# Patient Record
Sex: MALE | Race: WHITE | NOT HISPANIC OR LATINO | Employment: OTHER | URBAN - METROPOLITAN AREA
[De-identification: names, ages, dates, MRNs, and addresses within clinical notes are randomized per-mention and may not be internally consistent; named-entity substitution may affect disease eponyms.]

---

## 2017-02-03 ENCOUNTER — TELEPHONE (OUTPATIENT)
Dept: CARDIOLOGY | Facility: MEDICAL CENTER | Age: 67
End: 2017-02-03

## 2017-02-04 NOTE — TELEPHONE ENCOUNTER
I left a message for Ed to call us.  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  She wants a digoxin level yearly as well.

## 2017-02-09 NOTE — TELEPHONE ENCOUNTER
Juliet Siddiqi R.N.                 AM/Lianet     Patient is returning Siobhan's call and can be reached at [phone].       S/w pt, advised of lab work that needs to be done that was ordered in May which includes Dig level.  Pt will have lab work done tomorrow morning and will be fasting.  Pt takes Dig at night and was educated not taking Dig at least 8 hours prior to lab work.  Pt v/u.  Pt will go to [facility] facility for lab work and lab orders are still active.

## 2017-02-10 ENCOUNTER — HOSPITAL ENCOUNTER (OUTPATIENT)
Dept: LAB | Facility: MEDICAL CENTER | Age: 67
End: 2017-02-10
Attending: FAMILY MEDICINE
Payer: MEDICARE

## 2017-02-10 ENCOUNTER — HOSPITAL ENCOUNTER (OUTPATIENT)
Dept: LAB | Facility: MEDICAL CENTER | Age: 67
End: 2017-02-10
Attending: INTERNAL MEDICINE
Payer: MEDICARE

## 2017-02-10 DIAGNOSIS — R73.03 PRE-DIABETES: ICD-10-CM

## 2017-02-10 DIAGNOSIS — E78.5 HYPERLIPIDEMIA: ICD-10-CM

## 2017-02-10 DIAGNOSIS — I47.19 AVNRT (AV NODAL RE-ENTRY TACHYCARDIA): ICD-10-CM

## 2017-02-10 DIAGNOSIS — Z12.5 PROSTATE CANCER SCREENING: ICD-10-CM

## 2017-02-10 LAB
ALBUMIN SERPL BCP-MCNC: 4.7 G/DL (ref 3.2–4.9)
ALBUMIN/GLOB SERPL: 1.4 G/DL
ALP SERPL-CCNC: 74 U/L (ref 30–99)
ALT SERPL-CCNC: 28 U/L (ref 2–50)
ANION GAP SERPL CALC-SCNC: 9 MMOL/L (ref 0–11.9)
AST SERPL-CCNC: 22 U/L (ref 12–45)
BILIRUB SERPL-MCNC: 1.2 MG/DL (ref 0.1–1.5)
BUN SERPL-MCNC: 25 MG/DL (ref 8–22)
CALCIUM SERPL-MCNC: 9.6 MG/DL (ref 8.4–10.2)
CHLORIDE SERPL-SCNC: 105 MMOL/L (ref 96–112)
CHOLEST SERPL-MCNC: 196 MG/DL (ref 100–199)
CO2 SERPL-SCNC: 26 MMOL/L (ref 20–33)
CREAT SERPL-MCNC: 0.96 MG/DL (ref 0.5–1.4)
DIGOXIN SERPL-MCNC: 0.8 NG/ML (ref 0.8–2)
EST. AVERAGE GLUCOSE BLD GHB EST-MCNC: 103 MG/DL
GFR SERPL CREATININE-BSD FRML MDRD: >60 ML/MIN/1.73 M 2
GLOBULIN SER CALC-MCNC: 3.3 G/DL (ref 1.9–3.5)
GLUCOSE SERPL-MCNC: 116 MG/DL (ref 65–99)
HBA1C MFR BLD: 5.2 % (ref 0–5.6)
HDLC SERPL-MCNC: 53 MG/DL
LDLC SERPL CALC-MCNC: 117 MG/DL
POTASSIUM SERPL-SCNC: 4 MMOL/L (ref 3.6–5.5)
PROT SERPL-MCNC: 8 G/DL (ref 6–8.2)
PSA SERPL DL<=0.01 NG/ML-MCNC: 1.51 NG/ML (ref 0–4)
SODIUM SERPL-SCNC: 140 MMOL/L (ref 135–145)
TRIGL SERPL-MCNC: 130 MG/DL (ref 0–149)

## 2017-02-10 PROCEDURE — 83036 HEMOGLOBIN GLYCOSYLATED A1C: CPT | Mod: GA

## 2017-02-10 PROCEDURE — 84153 ASSAY OF PSA TOTAL: CPT | Mod: GA

## 2017-02-13 ENCOUNTER — TELEPHONE (OUTPATIENT)
Dept: CARDIOLOGY | Facility: MEDICAL CENTER | Age: 67
End: 2017-02-13

## 2017-02-13 NOTE — TELEPHONE ENCOUNTER
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 Would you like him to go to 20 mg QD?

## 2017-02-13 NOTE — TELEPHONE ENCOUNTER
PT called with message. No answer in both numbers.  Left VM to call back.  MMR    ----- Message from Luciana Ellington M.D. sent at 2/10/2017  4:30 PM PST -----  LDL not at goal.  Unclear but the patient is taking Zocor or not. If he is not taking Zocor consider restarting Zocor.  Labs prior to next visit  Scheduled him to see me in June 2017.    ----- Message -----     From: Maria Luisa Tamayo R.N.     Sent: 2/10/2017   8:30 AM       To: Luciana Ellington M.D.    No F/V

## 2017-02-14 DIAGNOSIS — E78.5 HYPERLIPIDEMIA, UNSPECIFIED HYPERLIPIDEMIA TYPE: ICD-10-CM

## 2017-02-14 RX ORDER — SIMVASTATIN 40 MG
40 TABLET ORAL EVERY EVENING
Qty: 90 TAB | Refills: 3 | OUTPATIENT
Start: 2017-02-14 | End: 2018-01-16 | Stop reason: SDUPTHER

## 2017-03-01 RX ORDER — LISINOPRIL 10 MG/1
TABLET ORAL
Qty: 90 TAB | Refills: 3 | Status: SHIPPED | OUTPATIENT
Start: 2017-03-01 | End: 2018-03-14 | Stop reason: SDUPTHER

## 2017-03-02 ENCOUNTER — OFFICE VISIT (OUTPATIENT)
Dept: MEDICAL GROUP | Facility: MEDICAL CENTER | Age: 67
End: 2017-03-02
Payer: MEDICARE

## 2017-03-02 VITALS
HEIGHT: 69 IN | SYSTOLIC BLOOD PRESSURE: 118 MMHG | OXYGEN SATURATION: 97 % | TEMPERATURE: 97.8 F | WEIGHT: 198 LBS | RESPIRATION RATE: 16 BRPM | HEART RATE: 82 BPM | BODY MASS INDEX: 29.33 KG/M2 | DIASTOLIC BLOOD PRESSURE: 82 MMHG

## 2017-03-02 DIAGNOSIS — Z95.0 CARDIAC PACEMAKER IN SITU: ICD-10-CM

## 2017-03-02 DIAGNOSIS — I47.19 AVNRT (AV NODAL RE-ENTRY TACHYCARDIA): ICD-10-CM

## 2017-03-02 DIAGNOSIS — I44.1 SECOND DEGREE AV BLOCK: ICD-10-CM

## 2017-03-02 DIAGNOSIS — I10 ESSENTIAL HYPERTENSION: ICD-10-CM

## 2017-03-02 DIAGNOSIS — E78.00 PURE HYPERCHOLESTEROLEMIA: ICD-10-CM

## 2017-03-02 DIAGNOSIS — K21.9 GASTROESOPHAGEAL REFLUX DISEASE, ESOPHAGITIS PRESENCE NOT SPECIFIED: ICD-10-CM

## 2017-03-02 DIAGNOSIS — E55.9 VITAMIN D INSUFFICIENCY: ICD-10-CM

## 2017-03-02 DIAGNOSIS — Z00.00 MEDICARE ANNUAL WELLNESS VISIT, SUBSEQUENT: ICD-10-CM

## 2017-03-02 DIAGNOSIS — R73.03 PRE-DIABETES: ICD-10-CM

## 2017-03-02 DIAGNOSIS — Z85.828 HX OF SKIN CANCER, BASAL CELL: ICD-10-CM

## 2017-03-02 PROCEDURE — G8510 SCR DEP NEG, NO PLAN REQD: HCPCS | Performed by: FAMILY MEDICINE

## 2017-03-02 PROCEDURE — 1036F TOBACCO NON-USER: CPT | Performed by: FAMILY MEDICINE

## 2017-03-02 PROCEDURE — G0439 PPPS, SUBSEQ VISIT: HCPCS | Performed by: FAMILY MEDICINE

## 2017-03-02 RX ORDER — PANTOPRAZOLE SODIUM 40 MG/1
40 TABLET, DELAYED RELEASE ORAL
Qty: 90 TAB | Refills: 3 | Status: SHIPPED | OUTPATIENT
Start: 2017-03-02 | End: 2017-11-15 | Stop reason: SDUPTHER

## 2017-03-02 ASSESSMENT — PATIENT HEALTH QUESTIONNAIRE - PHQ9: CLINICAL INTERPRETATION OF PHQ2 SCORE: 0

## 2017-03-02 NOTE — MR AVS SNAPSHOT
"        Emil Reid   3/2/2017 11:00 AM   Office Visit   MRN: [id number]    Department:  [facility]   Dept Phone:  [phone]    Description:  Male : 1950   Provider:  Edmond Flower M.D.           Allergies as of 3/2/2017     No Known Allergies      You were diagnosed with     Medicare annual wellness visit, subsequent   [812853]       Pure hypercholesterolemia   [272.0.ICD-9-CM]       Essential hypertension   [7948812]       Cardiac pacemaker in situ   [V45.01.ICD-9-CM]       Second degree AV block   [619449]       AVNRT (AV hitesh re-entry tachycardia) (CMS-HCC)   [282410]       Vitamin D insufficiency   [944795]       Pre-diabetes   [711964]       Gastroesophageal reflux disease, esophagitis presence not specified   [8385331]       Hx of skin cancer, basal cell   [471412]         Vital Signs     Blood Pressure Pulse Temperature Respirations Height Weight    118/82 mmHg 82 36.6 °C (97.8 °F) 16 1.753 m (5' 9\") 89.812 kg (198 lb)    Body Mass Index Oxygen Saturation Smoking Status             29.23 kg/m2 97% Never Smoker          Basic Information     Date Of Birth Sex Race Ethnicity Preferred Language    1950 Male White Non- English      Problem List              ICD-10-CM Priority Class Noted - Resolved    Hypertension I10 High  2014 - Present    Hyperlipidemia E78.5 High  2014 - Present    GERD (gastroesophageal reflux disease) K21.9 Medium  2014 - Present    Vitamin D insufficiency E55.9 Low  2014 - Present    Hx of skin cancer, basal cell Z85.828 Low  2014 - Present    Pre-diabetes R73.03   2015 - Present    AVNRT (AV hitesh re-entry tachycardia) (CMS-HCC) I47.1 High  2015 - Present    Second degree AV block I44.1 High  2015 - Present    Cardiac pacemaker in situ Z95.0 High  2015 - Present      Health Maintenance        Date Due Completion Dates    IMM ZOSTER VACCINE 2008 ---    IMM PNEUMOCOCCAL 65+ (ADULT) LOW/MEDIUM RISK SERIES (1 " of 2 - PCV13) 6/16/2013 ---    COLONOSCOPY 5/4/2020 5/4/2010, 5/4/2010 (Done)    Override on 5/4/2010: Done    IMM DTaP/Tdap/Td Vaccine (2 - Td) 1/17/2021 1/17/2011            Current Immunizations     Influenza TIV (IM) 10/14/2014    Influenza Vaccine Adult HD 9/9/2016    Influenza Vaccine Quad Inj (Pf) 9/2/2015    Tdap Vaccine 1/17/2011      Below and/or attached are the medications your provider expects you to take. Review all of your home medications and newly ordered medications with your provider and/or pharmacist. Follow medication instructions as directed by your provider and/or pharmacist. Please keep your medication list with you and share with your provider. Update the information when medications are discontinued, doses are changed, or new medications (including over-the-counter products) are added; and carry medication information at all times in the event of emergency situations     Allergies:  No Known Allergies          Medications  Valid as of: March 02, 2017 - 12:30 PM    Generic Name Brand Name Tablet Size Instructions for use    Ascorbic Acid (Tab) ascorbic acid 500 MG Take 500 mg by mouth every day.        Aspirin (Chew Tab) ASA 81 MG Take 81 mg by mouth every day.        Calcium Carb-Cholecalciferol (Tab) Calcium + D3 600-200 MG-UNIT Take  by mouth.        Cholecalciferol   1,000 Units every day.        Digoxin (Tab) LANOXIN 250 MCG Take 1 Tab by mouth every day.        Lisinopril (Tab) PRINIVIL 10 MG TAKE 1 TABLET BY MOUTH EVERY DAY        Multiple Vitamins-Minerals   Take  by mouth.        Omega-3 Fatty Acids (Cap) OMEGA 3 FA 1000 MG Take 1,000 mg by mouth 3 times a day, with meals.        Pantoprazole Sodium (Tablet Delayed Response) PROTONIX 40 MG Take 1 Tab by mouth every day.        Simvastatin (Tab) ZOCOR 40 MG Take 1 Tab by mouth every evening.        .                 Medicines prescribed today were sent to:     [id number] - [place], [place] - [address] AT [facility]    [address] [place] [address]    Phone: [phone] Fax: [phone]    Open 24 Hours?: No    [id number] - [place], [place] - [address]    [address] [place] [address]    Phone: [phone] Fax: [phone]    Open 24 Hours?: No      Medication refill instructions:       If your prescription bottle indicates you have medication refills left, it is not necessary to call your provider’s office. Please contact your pharmacy and they will refill your medication.    If your prescription bottle indicates you do not have any refills left, you may request refills at any time through one of the following ways: The online Desmos system (except Urgent Care), by calling your provider’s office, or by asking your pharmacy to contact your provider’s office with a refill request. Medication refills are processed only during regular business hours and may not be available until the next business day. Your provider may request additional information or to have a follow-up visit with you prior to refilling your medication.   *Please Note: Medication refills are assigned a new Rx number when refilled electronically. Your pharmacy may indicate that no refills were authorized even though a new prescription for the same medication is available at the pharmacy. Please request the medicine by name with the pharmacy before contacting your provider for a refill.           Desmos Access Code: Activation code not generated  Current Desmos Status: Active

## 2017-03-02 NOTE — PROGRESS NOTES
HPI:  Emil is a 66 y.o. here for Medicare Annual Wellness Visit     Patient states that his heartburn is well controlled with pantoprazole.  Is being followed by cardiology. Patient has no complaints with pacemaker. He is tolerating lisinopril, digoxin, simvastatin. He states his simvastatin dose was increased because his LDL was over 100 at 117.    Patient Active Problem List    Diagnosis Date Noted   • Cardiac pacemaker in situ 07/07/2015     Priority: High   • Second degree AV block 07/01/2015     Priority: High   • AVNRT (AV hitesh re-entry tachycardia) (CMS-[facility]) 06/22/2015     Priority: High   • Hypertension 01/09/2014     Priority: High   • Hyperlipidemia 01/09/2014     Priority: High   • GERD (gastroesophageal reflux disease) 01/09/2014     Priority: Medium   • Vitamin D insufficiency 01/09/2014     Priority: Low   • Hx of skin cancer, basal cell 01/09/2014     Priority: Low   • Pre-diabetes 04/22/2015       Current Outpatient Prescriptions   Medication Sig Dispense Refill   • pantoprazole (PROTONIX) 40 MG Tablet Delayed Response Take 1 Tab by mouth every day. 90 Tab 3   • simvastatin (ZOCOR) 40 MG Tab Take 1 Tab by mouth every evening. 90 Tab 3   • digoxin (LANOXIN) 250 MCG Tab Take 1 Tab by mouth every day. 90 Tab 3   • Calcium Carb-Cholecalciferol (CALCIUM + D3) 600-200 MG-UNIT TABS Take  by mouth.     • docosahexanoic acid (OMEGA 3 FA) 1000 MG CAPS Take 1,000 mg by mouth 3 times a day, with meals.     • Cholecalciferol (VITAMIN D3) 1,000 Units every day.     • Multiple Vitamins-Minerals (CENTRUM SILVER ULTRA MENS PO) Take  by mouth.     • ascorbic acid (ASCORBIC ACID) 500 MG TABS Take 500 mg by mouth every day.     • aspirin (ASA) 81 MG CHEW Take 81 mg by mouth every day.     • lisinopril (PRINIVIL) 10 MG Tab TAKE 1 TABLET BY MOUTH EVERY DAY 90 Tab 3     No current facility-administered medications for this visit.            Current supplements as per medication list.       Allergies: Review of  patient's allergies indicates no known allergies.    Current social contact/activities: lives with wife. 1 daughter in [place]. 1 son in [facility]. Has a male clubs that he meets up with daily.     He  reports that he has never smoked. He has quit using smokeless tobacco. He reports that he drinks about 1.0 oz of alcohol per week. He reports that he does not use illicit drugs.  Counseling given: Not Answered        DPA/Advanced directive: Patient does have an advanced directive. If not on file, instructed to bring in a copy to scan into their chart. If no advanced directive exists, a packet and workshop information was given on advanced directives.    ROS:    Gait: Uses no assistive device   Ostomy: no   Other tubes: no   Amputations: no   Chronic oxygen use no   Last eye exam: within the last year.   : Denies incontinence.         Depression Screening    Little interest or pleasure in doing things?  0 - not at all  Feeling down, depressed , or hopeless? 0 - not at all  Trouble falling or staying asleep, or sleeping too much?     Feeling tired or having little energy?     Poor appetite or overeating?     Feeling bad about yourself - or that you are a failure or have let yourself or your family down?    Trouble concentrating on things, such as reading the newspaper or watching television?    Moving or speaking so slowly that other people could have noticed.  Or the opposite - being so fidgety or restless that you have been moving around a lot more than usual?     Thoughts that you would be better off dead, or of hurting yourself?     Patient Health Questionnaire Score:      If depressive symptoms identified deferred to follow up visit unless specifically addressed in assesment and plan.      Screening for Cognitive Impairment    Three Minute Recall (banana, sunrise, fence)  3/3    Draw clock face with all 12 numbers set to the hand to show 10 minures past 11 o'clock  1    Cognitive concerns identified defferred  for follow up unless specifically addressed in assesment and plan.    Fall Risk Assessment    Has the patient had two or more falls in the last year or any fall with injury in the last year?  No    Safety Assessment    Throw rugs on floor.  Yes  Handrails on all stairs.  Yes  Good lighting in all hallways.  Yes  Difficulty hearing.  No  Patient counseled about all safety risks that were identified.    Functional Assessment ADLs    Are there any barriers preventing you from cooking for yourself or meeting nutritional needs?  No.    Are there any barriers preventing you from driving safely or obtaining transportation?  No.    Are there any barriers preventing you from using a telephone or calling for help?  No.    Are there any barriers preventing you from shopping?  No.    Are there any barriers preventing you from taking care of your own finances?  No.    Are there any barriers preventing you from managing your medications?  No.    Are currently engaing any exercise or physical activity?  Yes.       Health Maintenance Summary                IMM ZOSTER VACCINE Overdue 6/16/2008     IMM PNEUMOCOCCAL 65+ (ADULT) LOW/MEDIUM RISK SERIES Overdue 6/16/2013     Annual Wellness Visit Overdue 3/1/2017      Done 2/29/2016 Visit Dx: Medicare annual wellness visit, subsequent     Patient has more history with this topic...    COLONOSCOPY Next Due 5/4/2020      Done 5/4/2010      Patient has more history with this topic...    IMM DTaP/Tdap/Td Vaccine Next Due 1/17/2021      Done 1/17/2011 Imm Admin: Tdap Vaccine          Patient Care Team:  Edmond Flower M.D. as PCP - General (Family Medicine)      Social History   Substance Use Topics   • Smoking status: Never Smoker    • Smokeless tobacco: Former User      Comment: 8-10 years, but quit 35 years ago   • Alcohol Use: 1.0 oz/week     1 Glasses of wine, 1 Cans of beer per week      Comment: occ     Family History   Problem Relation Age of Onset   • Heart Disease Mother    •  "Diabetes Mother    • Cancer Father 86     Pancreatitic     He  has a past medical history of GERD (gastroesophageal reflux disease) ( ); Hypertension; Hyperlipemia; Heart burn; Indigestion; AV block (July 2015); and AVNRT (AV hitesh re-entry tachycardia) (CMS-[facility]).   Past Surgical History   Procedure Laterality Date   • Appendectomy     • Inguinal hernia repair bilateral     • Pilonidal cyst excision     • Recovery  7/1/2015     Procedure:  CATH LAB  PM INSERT [facility] ICD9: 426.13;  Surgeon: Recoveryonly Surgery;  Location: SURGERY PRE-POST PROC UNIT [facility];  Service:    • Pacemaker insertion  July 2015     [facility] #[id number] implanted by Dr. Sanchez.       Exam:     Blood pressure 118/82, pulse 82, temperature 36.6 °C (97.8 °F), resp. rate 16, height 1.753 m (5' 9\"), weight 89.812 kg (198 lb), SpO2 97 %. Body mass index is 29.23 kg/(m^2).    Constitutional: Alert, no distress.  Skin: Warm, dry, good turgor, no rashes in visible areas.  Eye: Equal, round and reactive, conjunctiva clear, lids normal.  ENMT: Lips without lesions, good dentition, oropharynx clear.  Neck: Trachea midline, no masses, no thyromegaly. No cervical or supraclavicular lymphadenopathy  Respiratory: Unlabored respiratory effort, lungs clear to auscultation, no wheezes, no ronchi.  Cardiovascular: Normal S1, S2, no murmur, no edema.  Psych: Alert and oriented x3, normal affect and mood.        Assessment and Plan. The following treatment and monitoring plan is recommended:    1. Medicare annual wellness visit, subsequent  Patient has lost about 17 pounds over last 2 years by eating healthy.  Patient believes he has had pneumonia and shingles vaccine. He forgets to bring records today. He will bring records at next visit.  - Annual Wellness Visit - Includes PPPS Subsequent ()    2. Pure hypercholesterolemia  Continue current medication and follow up with cardiology as scheduled with labs.  - Annual Wellness Visit - Includes " PPPS Subsequent ()    3. Essential hypertension  Controlled. Continue current medication.  - Annual Wellness Visit - Includes PPPS Subsequent ()    4. Cardiac pacemaker in situ  Stable.  - Annual Wellness Visit - Includes PPPS Subsequent ()    5. Second degree AV block  Controlled. Continue pacemaker.  - Annual Wellness Visit - Includes PPPS Subsequent ()    6. AVNRT (AV hitesh re-entry tachycardia) (CMS-HCC)  Controlled. Continue pacemaker.  - Annual Wellness Visit - Includes PPPS Subsequent ()    7. Vitamin D insufficiency  Continue supplementation.  - Annual Wellness Visit - Includes PPPS Subsequent ()    8. Pre-diabetes  Advised healthy diet and regular exercise.  - Annual Wellness Visit - Includes PPPS Subsequent ()    9. Gastroesophageal reflux disease, esophagitis presence not specified  Controlled. Continue current medication.  - pantoprazole (PROTONIX) 40 MG Tablet Delayed Response; Take 1 Tab by mouth every day.  Dispense: 90 Tab; Refill: 3  - Annual Wellness Visit - Includes PPPS Subsequent ()    10. Hx of skin cancer, basal cell  Continue following with dermatology  - Annual Wellness Visit - Includes PPPS Subsequent ()        Services needed: as per orders if indicated.  Health Care Screening: Age-appropriate preventive services Medicare covers discussed today and ordered if indicated.    Referrals offered: Community-based lifestyle interventions to reduce health risks and promote self-management and wellness, fall prevention, nutrition, physical activity, tobacco-use cessation, weight loss, and mental health services as per orders if indicated.    Discussion today about general wellness and lifestyle habits:    · Prevent falls and reduce trip hazards; Cautioned about securing or removing rugs.  · Have a working fire alarm and carbon monoxide detector;   · Engage in regular physical activity and social activities       Follow-up: Return in about 1 year (around  3/2/2018) for Annual, Long.

## 2017-04-14 ENCOUNTER — HOSPITAL ENCOUNTER (OUTPATIENT)
Dept: LAB | Facility: MEDICAL CENTER | Age: 67
End: 2017-04-14
Attending: INTERNAL MEDICINE
Payer: MEDICARE

## 2017-04-14 DIAGNOSIS — E78.5 HYPERLIPIDEMIA, UNSPECIFIED HYPERLIPIDEMIA TYPE: ICD-10-CM

## 2017-04-14 LAB
ALBUMIN SERPL BCP-MCNC: 4.2 G/DL (ref 3.2–4.9)
ALBUMIN/GLOB SERPL: 1.4 G/DL
ALP SERPL-CCNC: 85 U/L (ref 30–99)
ALT SERPL-CCNC: 30 U/L (ref 2–50)
ANION GAP SERPL CALC-SCNC: 6 MMOL/L (ref 0–11.9)
AST SERPL-CCNC: 24 U/L (ref 12–45)
BILIRUB SERPL-MCNC: 0.4 MG/DL (ref 0.1–1.5)
BUN SERPL-MCNC: 24 MG/DL (ref 8–22)
CALCIUM SERPL-MCNC: 9.3 MG/DL (ref 8.5–10.5)
CHLORIDE SERPL-SCNC: 108 MMOL/L (ref 96–112)
CHOLEST SERPL-MCNC: 151 MG/DL (ref 100–199)
CO2 SERPL-SCNC: 26 MMOL/L (ref 20–33)
CREAT SERPL-MCNC: 0.8 MG/DL (ref 0.5–1.4)
GFR SERPL CREATININE-BSD FRML MDRD: >60 ML/MIN/1.73 M 2
GLOBULIN SER CALC-MCNC: 2.9 G/DL (ref 1.9–3.5)
GLUCOSE SERPL-MCNC: 106 MG/DL (ref 65–99)
HDLC SERPL-MCNC: 54 MG/DL
LDLC SERPL CALC-MCNC: 73 MG/DL
POTASSIUM SERPL-SCNC: 4.1 MMOL/L (ref 3.6–5.5)
PROT SERPL-MCNC: 7.1 G/DL (ref 6–8.2)
SODIUM SERPL-SCNC: 140 MMOL/L (ref 135–145)
TRIGL SERPL-MCNC: 122 MG/DL (ref 0–149)

## 2017-04-14 PROCEDURE — 36415 COLL VENOUS BLD VENIPUNCTURE: CPT

## 2017-04-14 PROCEDURE — 80053 COMPREHEN METABOLIC PANEL: CPT

## 2017-04-14 PROCEDURE — 80061 LIPID PANEL: CPT

## 2017-04-18 ENCOUNTER — PATIENT MESSAGE (OUTPATIENT)
Dept: MEDICAL GROUP | Facility: MEDICAL CENTER | Age: 67
End: 2017-04-18

## 2017-04-18 RX ORDER — DOXYCYCLINE HYCLATE 100 MG
100 TABLET ORAL 2 TIMES DAILY
Qty: 14 TAB | Refills: 0 | Status: SHIPPED | OUTPATIENT
Start: 2017-04-18 | End: 2017-04-25

## 2017-04-18 NOTE — TELEPHONE ENCOUNTER
From: Emil Reid  To: Edmond Flower M.D.  Sent: 4/18/2017 7:27 AM PDT  Subject: Non-Urgent Medical Question    I am going to [facility] [place] [place] of [place] for about 3 weeks. I'll be hiking most of the time. I'm thinking I should take an antibiotic with me just in case I get sick. Will you send a prescription to [facility] please?   Thank you,  Emil Reid  1950

## 2017-04-20 ENCOUNTER — PATIENT MESSAGE (OUTPATIENT)
Dept: CARDIOLOGY | Facility: MEDICAL CENTER | Age: 67
End: 2017-04-20

## 2017-04-20 DIAGNOSIS — I47.19 AVNRT (AV NODAL RE-ENTRY TACHYCARDIA): ICD-10-CM

## 2017-04-20 RX ORDER — DIGOXIN 250 MCG
250 TABLET ORAL DAILY
Qty: 90 TAB | Refills: 0 | OUTPATIENT
Start: 2017-04-20 | End: 2017-07-17 | Stop reason: SDUPTHER

## 2017-06-23 RX ORDER — LISINOPRIL 10 MG/1
TABLET ORAL
Qty: 90 TAB | Refills: 3 | Status: ON HOLD | OUTPATIENT
Start: 2017-06-23 | End: 2018-02-21

## 2017-06-23 NOTE — TELEPHONE ENCOUNTER
Was the patient seen in the last year in this department? Yes     Does patient have an active prescription for medications requested? No     Received Request Via: Pharmacy     Change in Pharmacy  Last seen: 03/02/2017 [place]

## 2017-07-17 DIAGNOSIS — I47.19 AVNRT (AV NODAL RE-ENTRY TACHYCARDIA): ICD-10-CM

## 2017-07-17 RX ORDER — DIGOXIN 250 MCG
250 TABLET ORAL DAILY
Qty: 90 TAB | Refills: 0 | OUTPATIENT
Start: 2017-07-17 | End: 2017-08-17 | Stop reason: SDUPTHER

## 2017-07-19 ENCOUNTER — OFFICE VISIT (OUTPATIENT)
Dept: CARDIOLOGY | Facility: MEDICAL CENTER | Age: 67
End: 2017-07-19
Payer: MEDICARE

## 2017-07-19 ENCOUNTER — NON-PROVIDER VISIT (OUTPATIENT)
Dept: CARDIOLOGY | Facility: MEDICAL CENTER | Age: 67
End: 2017-07-19
Payer: MEDICARE

## 2017-07-19 VITALS
WEIGHT: 196 LBS | BODY MASS INDEX: 29.03 KG/M2 | HEIGHT: 69 IN | DIASTOLIC BLOOD PRESSURE: 72 MMHG | OXYGEN SATURATION: 96 % | SYSTOLIC BLOOD PRESSURE: 122 MMHG | HEART RATE: 72 BPM

## 2017-07-19 VITALS
HEART RATE: 72 BPM | HEIGHT: 69 IN | BODY MASS INDEX: 29.03 KG/M2 | OXYGEN SATURATION: 96 % | DIASTOLIC BLOOD PRESSURE: 72 MMHG | SYSTOLIC BLOOD PRESSURE: 122 MMHG | WEIGHT: 196 LBS

## 2017-07-19 DIAGNOSIS — Z95.0 CARDIAC PACEMAKER IN SITU: ICD-10-CM

## 2017-07-19 DIAGNOSIS — I44.1 SECOND DEGREE AV BLOCK: ICD-10-CM

## 2017-07-19 DIAGNOSIS — I47.19 AVNRT (AV NODAL RE-ENTRY TACHYCARDIA): ICD-10-CM

## 2017-07-19 DIAGNOSIS — R09.89 PROMINENT ABDOMINAL AORTIC PULSATION: ICD-10-CM

## 2017-07-19 DIAGNOSIS — E78.00 PURE HYPERCHOLESTEROLEMIA: ICD-10-CM

## 2017-07-19 DIAGNOSIS — I10 ESSENTIAL HYPERTENSION: ICD-10-CM

## 2017-07-19 PROCEDURE — 93288 INTERROG EVL PM/LDLS PM IP: CPT | Performed by: NURSE PRACTITIONER

## 2017-07-19 PROCEDURE — 99214 OFFICE O/P EST MOD 30 MIN: CPT | Performed by: INTERNAL MEDICINE

## 2017-07-19 RX ORDER — DIGOXIN 125 MCG
125 TABLET ORAL PRN
Qty: 30 TAB | Refills: 0 | Status: SHIPPED | OUTPATIENT
Start: 2017-07-19 | End: 2017-08-17 | Stop reason: SDUPTHER

## 2017-07-19 ASSESSMENT — ENCOUNTER SYMPTOMS
SHORTNESS OF BREATH: 0
CLAUDICATION: 0
ORTHOPNEA: 0
NERVOUS/ANXIOUS: 0
DEPRESSION: 0
ABDOMINAL PAIN: 0
MYALGIAS: 0
PND: 0
SPEECH CHANGE: 0
PALPITATIONS: 0
FEVER: 0
BRUISES/BLEEDS EASILY: 0
LOSS OF CONSCIOUSNESS: 0
BLURRED VISION: 0

## 2017-07-19 NOTE — PROGRESS NOTES
Subjective:   Emil Reid is a 65 y.o. 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 Pierre presenting today for follow-up evaluation of AVNRT and PPM evaluation.    Patient recently ran out of digoxin no further episodes of AVNRT. Denied any complaints of exertional chest pain pressure or tightness. No complaints of palpitations orthopnea or PND. Denied any complaints of myalgias while on statins. No complaints of lower extremity edema or claudication.    Past Medical History   Diagnosis Date   • GERD (gastroesophageal reflux disease)     • Hypertension    • Hyperlipemia    • Heart burn    • Indigestion    • AV block July 2015     Status post PPM implantation.   • AVNRT (AV hitesh re-entry tachycardia)      Past Surgical History   Procedure Laterality Date   • Appendectomy     • Inguinal hernia repair bilateral     • Pilonidal cyst excision     • Recovery  7/1/2015     Procedure:  CATH LAB  PM INSERT [facility] ICD9: 426.13;  Surgeon: Recoveryonly Surgery;  Location: SURGERY PRE-POST PROC UNIT [facility] [place];  Service:    • Pacemaker insertion  July 2015     [employer] [facility] [facility] #[id number] implanted by Dr. Sanchez.     Family History   Problem Relation Age of Onset   • Heart Disease Mother    • Diabetes Mother    • Cancer Father 86     Pancreatitic     History   Smoking status   • Never Smoker    Smokeless tobacco   • Former User     Comment: 8-10 years, but quit 35 years ago     No Known Allergies  Outpatient Encounter Prescriptions as of 7/19/2017   Medication Sig Dispense Refill   • digoxin (LANOXIN) 250 MCG Tab Take 1 Tab by mouth every day. 90 Tab 0   • pantoprazole (PROTONIX) 40 MG Tablet Delayed Response Take 1 Tab by mouth every day. 90 Tab 3   • lisinopril (PRINIVIL) 10 MG Tab TAKE 1 TABLET BY MOUTH EVERY DAY 90 Tab 3   • simvastatin (ZOCOR) 40 MG Tab Take 1 Tab by mouth every evening. 90 Tab 3   • Calcium  "Carb-Cholecalciferol (CALCIUM + D3) 600-200 MG-UNIT TABS Take  by mouth.     • docosahexanoic acid (OMEGA 3 FA) 1000 MG CAPS Take 1,000 mg by mouth 3 times a day, with meals.     • Multiple Vitamins-Minerals (CENTRUM SILVER ULTRA MENS PO) Take  by mouth.     • ascorbic acid (ASCORBIC ACID) 500 MG TABS Take 500 mg by mouth every day.     • aspirin (ASA) 81 MG CHEW Take 81 mg by mouth every day.     • lisinopril (PRINIVIL) 10 MG Tab TAKE 1 TABLET BY MOUTH DAILY 90 Tab 3   • Cholecalciferol (VITAMIN D3) 1,000 Units every day.       No facility-administered encounter medications on file as of 7/19/2017.     Review of Systems   Constitutional: Negative for fever.   HENT: Negative for congestion.    Eyes: Negative for blurred vision.   Respiratory: Negative for shortness of breath.    Cardiovascular: Negative for chest pain, palpitations, orthopnea, claudication, leg swelling and PND.   Gastrointestinal: Negative for abdominal pain.   Musculoskeletal: Negative for myalgias and joint pain.   Skin: Negative for rash.   Neurological: Negative for speech change and loss of consciousness.   Endo/Heme/Allergies: Does not bruise/bleed easily.   Psychiatric/Behavioral: Negative for depression. The patient is not nervous/anxious.    All other systems reviewed and are negative.       Objective:   /72 mmHg  Pulse 72  Ht 1.753 m (5' 9.02\")  Wt 88.905 kg (196 lb)  BMI 28.93 kg/m2  SpO2 96%    Physical Exam   Constitutional: He is oriented to person, place, and time. He appears well-developed and well-nourished.   HENT:   Mouth/Throat: Oropharynx is clear and moist.   Eyes: Conjunctivae and EOM are normal.   Neck: No JVD present. No tracheal deviation present. No thyroid mass present.   Cardiovascular: Normal rate, regular rhythm, S1 normal, S2 normal and normal pulses.  PMI is not displaced.  Exam reveals no gallop.    No murmur heard.  Pulses:       Carotid pulses are 2+ on the right side, and 2+ on the left side.       " Radial pulses are 2+ on the right side, and 2+ on the left side.        Femoral pulses are 2+ on the right side, and 2+ on the left side.       Dorsalis pedis pulses are 2+ on the right side, and 2+ on the left side.   No vascular bruits; peripheral pulses intact   Pulmonary/Chest: Effort normal and breath sounds normal. He has no rales.   Pacemaker site has healed   Abdominal: Soft. Normal appearance. He exhibits no abdominal bruit. There is no hepatosplenomegaly. There is no tenderness.   Prominent abdominal aortic pulsation   Musculoskeletal: Normal range of motion. He exhibits no edema.        Thoracic back: He exhibits no tenderness and no spasm.   Neurological: He is alert and oriented to person, place, and time.   Skin: No rash noted. No cyanosis. Nails show no clubbing.   Psychiatric: He has a normal mood and affect.      Results for ISRA SIMMONS (MRN [id number]) as of 2017 09:32   2/10/2017  2017    Sodium 140 140   Potassium 4.0 4.1   Chloride 105 108   Co2 26 26   Anion Gap 9.0 6.0   Glucose 116 (H) 106 (H)   Bun 25 (H) 24 (H)   Creatinine 0.96 0.80   GFR If Non African American >60 >60   Calcium 9.6 9.3   AST(SGOT) 22 24   ALT(SGPT) 28 30   Alkaline Phosphatase 74 85   Cholesterol,Tot 196 151   Triglycerides 130 122   HDL 53 54    (H) 73     EK/2/15  EKG personally reviewed by me.  NSR normal axis no significant ST or T-wave changes.  ms.    Nuclear stress test: 4/23/15  No evidence of significant jeopardized viable myocardium or prior myocardial infarction.  Normal left ventricular size, ejection fraction, and wall motion. EF 77%  No EKG changes with stress.    Transthoracic echo: 14  Normal left ventricular chamber size. Normal left ventricular systolic function. Grade I diastolic dysfunction is present. Mild concentric left ventricular hypertrophy. Left ventricular ejection fraction is 60% to 65%.  Severely dilated left atrium.  Trace mitral regurgitation.  Trace  aortic insufficiency.  Trace tricuspid regurgitation. Right ventricular systolic pressure is estimated to be 29 mmHg.  Trace pulmonic insufficiency    Assessment:     1. SVT  2. AVB s/p PPM  3. Dyslipidemia  4. HTN  5. Prominent abdominal aortic pulsation  Medical Decision Making:  Today's Assessment / Status / Plan:     1. Okay to take digoxin 250 mcg on PRN basis.  Also went over vagal maneuvers to terminate SVT episodes be   2. Device interrogation showed 12 mode switching episodes, all <10 seconds each.  Normal sensing and capture of RA and RV leads; stable impedances. Battery longevity is 8.4-9.1 years  3. Continue Zocor 10 mg qhs.  Check labs one year.  LFTs within normal range.  4. Continue lisinopril for blood pressure.  5. Abdominal aortic Doppler now.    If no evidence of aneurysm on Doppler follow-up in one year.  Labs prior to next visit.    Thank you for allowing me to participate in taking care of patient.    Luciana Mcbride MD.

## 2017-07-19 NOTE — PROGRESS NOTES
Device is working normally. 12 mode switching episodes, all <10 seconds each.  Normal sensing and capture of RA and RV leads; stable impedances. Battery longevity is 8.4-9.1 years.  No changes are made today.    FU in 6 months for next PM check with me. He does see Dr. Ellington today too.    Collaborating MD: Chandana

## 2017-07-19 NOTE — MR AVS SNAPSHOT
"        Emil Reid   2017 9:30 AM   Office Visit   MRN: [id number]    Department:  Heart [facility]   Dept Phone:  [phone]    Description:  Male : 1950   Provider:  Luciana Ellington M.D.           Reason for Visit     Follow-Up           Allergies as of 2017     No Known Allergies      You were diagnosed with     Prominent abdominal aortic pulsation   [483968]       Essential hypertension   [8560505]       Pure hypercholesterolemia   [272.0.ICD-9-CM]       AVNRT (AV hitesh re-entry tachycardia)   [581534]       Second degree AV block   [362599]       Cardiac pacemaker in situ   [V45.01.ICD-9-CM]         Vital Signs     Blood Pressure Pulse Height Weight Body Mass Index Oxygen Saturation    122/72 mmHg 72 1.753 m (5' 9.02\") 88.905 kg (196 lb) 28.93 kg/m2 96%    Smoking Status                   Never Smoker            Basic Information     Date Of Birth Sex Race Ethnicity Preferred Language    1950 Male White Non- English      Your appointments     Aug 07, 2017  2:00 PM   AORTO/ILIAC COMPLETE with VASCULAR LAB [facility], VASCULAR EXAM ROOM [facility]   NON-INVASIVE LAB [facility])    [address]  [place] [address]   [phone]           NPO x  6-8 hours, may take meds. Unless Diabetic, may have light meal.              Problem List              ICD-10-CM Priority Class Noted - Resolved    Hypertension I10 High  2014 - Present    Hyperlipidemia E78.5 High  2014 - Present    GERD (gastroesophageal reflux disease) K21.9 Medium  2014 - Present    Vitamin D insufficiency E55.9 Low  2014 - Present    Hx of skin cancer, basal cell Z85.828 Low  2014 - Present    Pre-diabetes R73.03   2015 - Present    AVNRT (AV hitesh re-entry tachycardia) I47.1 High  2015 - Present    Second degree AV block I44.1 High  2015 - Present    Cardiac pacemaker in situ Z95.0 High  2015 - Present      Health Maintenance        Date Due Completion Dates    IMM ZOSTER " VACCINE 6/16/2008 ---    IMM PNEUMOCOCCAL 65+ (ADULT) LOW/MEDIUM RISK SERIES (1 of 2 - PCV13) 6/16/2013 ---    IMM INFLUENZA (1) 9/1/2017 9/9/2016, 9/2/2015, 10/14/2014    COLONOSCOPY 5/4/2020 5/4/2010, 5/4/2010 (Done)    Override on 5/4/2010: Done    IMM DTaP/Tdap/Td Vaccine (2 - Td) 1/17/2021 1/17/2011            Current Immunizations     Influenza TIV (IM) 10/14/2014    Influenza Vaccine Adult HD 9/9/2016    Influenza Vaccine Quad Inj (Pf) 9/2/2015    Tdap Vaccine 1/17/2011      Below and/or attached are the medications your provider expects you to take. Review all of your home medications and newly ordered medications with your provider and/or pharmacist. Follow medication instructions as directed by your provider and/or pharmacist. Please keep your medication list with you and share with your provider. Update the information when medications are discontinued, doses are changed, or new medications (including over-the-counter products) are added; and carry medication information at all times in the event of emergency situations     Allergies:  No Known Allergies          Medications  Valid as of: July 19, 2017 -  9:47 AM    Generic Name Brand Name Tablet Size Instructions for use    Ascorbic Acid (Tab) ascorbic acid 500 MG Take 500 mg by mouth every day.        Aspirin (Chew Tab) ASA 81 MG Take 81 mg by mouth every day.        Calcium Carb-Cholecalciferol (Tab) Calcium + D3 600-200 MG-UNIT Take  by mouth.        Cholecalciferol   1,000 Units every day.        Digoxin (Tab) LANOXIN 250 MCG Take 1 Tab by mouth every day.        Lisinopril (Tab) PRINIVIL 10 MG TAKE 1 TABLET BY MOUTH EVERY DAY        Lisinopril (Tab) PRINIVIL 10 MG TAKE 1 TABLET BY MOUTH DAILY        Multiple Vitamins-Minerals   Take  by mouth.        Omega-3 Fatty Acids (Cap) OMEGA 3 FA 1000 MG Take 1,000 mg by mouth 3 times a day, with meals.        Pantoprazole Sodium (Tablet Delayed Response) PROTONIX 40 MG Take 1 Tab by mouth every day.         Simvastatin (Tab) ZOCOR 40 MG Take 1 Tab by mouth every evening.        .                 Medicines prescribed today were sent to:     [id number] - [place], [place] - [address]    [address] [place] [address]    Phone: [phone] Fax: [phone]    Open 24 Hours?: No    [id number] - [place], [place] - [address]    [address] [place] [address]    Phone: [phone] Fax: [phone]    Open 24 Hours?: No      Medication refill instructions:       If your prescription bottle indicates you have medication refills left, it is not necessary to call your provider’s office. Please contact your pharmacy and they will refill your medication.    If your prescription bottle indicates you do not have any refills left, you may request refills at any time through one of the following ways: The online [employer] system (except Urgent Care), by calling your provider’s office, or by asking your pharmacy to contact your provider’s office with a refill request. Medication refills are processed only during regular business hours and may not be available until the next business day. Your provider may request additional information or to have a follow-up visit with you prior to refilling your medication.   *Please Note: Medication refills are assigned a new Rx number when refilled electronically. Your pharmacy may indicate that no refills were authorized even though a new prescription for the same medication is available at the pharmacy. Please request the medicine by name with the pharmacy before contacting your provider for a refill.        Your To Do List     Future Labs/Procedures Complete By Expires    COMP METABOLIC PANEL  As directed 7/19/2018    LIPID PROFILE  As directed 7/19/2018    US-AORTA  As directed 7/19/2018         [employer] Access Code: Activation code not generated  Current [employer] Status: Active

## 2017-07-19 NOTE — Clinical Note
[facility]   [address]   [address]  [place] [address]  Phone: [phone]  Fax: [phone]              Emil Reid  1950    Encounter Date: 7/19/2017    Luciana Ellington M.D.          PROGRESS NOTE:  Subjective:   Emil Reid is a 65 y.o. 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 Pierre presenting today for follow-up evaluation of AVNRT and PPM evaluation.    Patient recently ran out of digoxin no further episodes of AVNRT. Denied any complaints of exertional chest pain pressure or tightness. No complaints of palpitations orthopnea or PND. Denied any complaints of myalgias while on statins. No complaints of lower extremity edema or claudication.    Past Medical History   Diagnosis Date   • GERD (gastroesophageal reflux disease)     • Hypertension    • Hyperlipemia    • Heart burn    • Indigestion    • AV block July 2015     Status post PPM implantation.   • AVNRT (AV hitesh re-entry tachycardia)      Past Surgical History   Procedure Laterality Date   • Appendectomy     • Inguinal hernia repair bilateral     • Pilonidal cyst excision     • Recovery  7/1/2015     Procedure:  CATH LAB  PM INSERT [facility] ICD9: 426.13;  Surgeon: Recoveryonly Surgery;  Location: SURGERY PRE-POST PROC UNIT [facility] [place];  Service:    • Pacemaker insertion  July 2015     [employer] [facility] #[id number] implanted by Dr. Sanchez.     Family History   Problem Relation Age of Onset   • Heart Disease Mother    • Diabetes Mother    • Cancer Father 86     Pancreatitic     History   Smoking status   • Never Smoker    Smokeless tobacco   • Former User     Comment: 8-10 years, but quit 35 years ago     No Known Allergies  Outpatient Encounter Prescriptions as of 7/19/2017   Medication Sig Dispense Refill   • digoxin (LANOXIN) 250 MCG Tab Take 1 Tab by mouth  "every day. 90 Tab 0   • pantoprazole (PROTONIX) 40 MG Tablet Delayed Response Take 1 Tab by mouth every day. 90 Tab 3   • lisinopril (PRINIVIL) 10 MG Tab TAKE 1 TABLET BY MOUTH EVERY DAY 90 Tab 3   • simvastatin (ZOCOR) 40 MG Tab Take 1 Tab by mouth every evening. 90 Tab 3   • Calcium Carb-Cholecalciferol (CALCIUM + D3) 600-200 MG-UNIT TABS Take  by mouth.     • docosahexanoic acid (OMEGA 3 FA) 1000 MG CAPS Take 1,000 mg by mouth 3 times a day, with meals.     • Multiple Vitamins-Minerals (CENTRUM SILVER ULTRA MENS PO) Take  by mouth.     • ascorbic acid (ASCORBIC ACID) 500 MG TABS Take 500 mg by mouth every day.     • aspirin (ASA) 81 MG CHEW Take 81 mg by mouth every day.     • lisinopril (PRINIVIL) 10 MG Tab TAKE 1 TABLET BY MOUTH DAILY 90 Tab 3   • Cholecalciferol (VITAMIN D3) 1,000 Units every day.       No facility-administered encounter medications on file as of 7/19/2017.     Review of Systems   Constitutional: Negative for fever.   HENT: Negative for congestion.    Eyes: Negative for blurred vision.   Respiratory: Negative for shortness of breath.    Cardiovascular: Negative for chest pain, palpitations, orthopnea, claudication, leg swelling and PND.   Gastrointestinal: Negative for abdominal pain.   Musculoskeletal: Negative for myalgias and joint pain.   Skin: Negative for rash.   Neurological: Negative for speech change and loss of consciousness.   Endo/Heme/Allergies: Does not bruise/bleed easily.   Psychiatric/Behavioral: Negative for depression. The patient is not nervous/anxious.    All other systems reviewed and are negative.       Objective:   /72 mmHg  Pulse 72  Ht 1.753 m (5' 9.02\")  Wt 88.905 kg (196 lb)  BMI 28.93 kg/m2  SpO2 96%    Physical Exam   Constitutional: He is oriented to person, place, and time. He appears well-developed and well-nourished.   HENT:   Mouth/Throat: Oropharynx is clear and moist.   Eyes: Conjunctivae and EOM are normal.   Neck: No JVD present. No tracheal " deviation present. No thyroid mass present.   Cardiovascular: Normal rate, regular rhythm, S1 normal, S2 normal and normal pulses.  PMI is not displaced.  Exam reveals no gallop.    No murmur heard.  Pulses:       Carotid pulses are 2+ on the right side, and 2+ on the left side.       Radial pulses are 2+ on the right side, and 2+ on the left side.        Femoral pulses are 2+ on the right side, and 2+ on the left side.       Dorsalis pedis pulses are 2+ on the right side, and 2+ on the left side.   No vascular bruits; peripheral pulses intact   Pulmonary/Chest: Effort normal and breath sounds normal. He has no rales.   Pacemaker site has healed   Abdominal: Soft. Normal appearance. He exhibits no abdominal bruit. There is no hepatosplenomegaly. There is no tenderness.   Musculoskeletal: Normal range of motion. He exhibits no edema.        Thoracic back: He exhibits no tenderness and no spasm.   Neurological: He is alert and oriented to person, place, and time.   Skin: No rash noted. No cyanosis. Nails show no clubbing.   Psychiatric: He has a normal mood and affect.      Results for ISRA SIMMONS (MRN [id number]) as of 2017 09:32   2/10/2017  2017    Sodium 140 140   Potassium 4.0 4.1   Chloride 105 108   Co2 26 26   Anion Gap 9.0 6.0   Glucose 116 (H) 106 (H)   Bun 25 (H) 24 (H)   Creatinine 0.96 0.80   GFR If Non African American >60 >60   Calcium 9.6 9.3   AST(SGOT) 22 24   ALT(SGPT) 28 30   Alkaline Phosphatase 74 85   Cholesterol,Tot 196 151   Triglycerides 130 122   HDL 53 54    (H) 73     EK/2/15  EKG personally reviewed by me.  NSR normal axis no significant ST or T-wave changes.  ms.    Nuclear stress test: 4/23/15  No evidence of significant jeopardized viable myocardium or prior myocardial infarction.  Normal left ventricular size, ejection fraction, and wall motion. EF 77%  No EKG changes with stress.    Transthoracic echo: 14  Normal left ventricular chamber size.  Normal left ventricular systolic function. Grade I diastolic dysfunction is present. Mild concentric left ventricular hypertrophy. Left ventricular ejection fraction is 60% to 65%.  Severely dilated left atrium.  Trace mitral regurgitation.  Trace aortic insufficiency.  Trace tricuspid regurgitation. Right ventricular systolic pressure is estimated to be 29 mmHg.  Trace pulmonic insufficiency    Assessment:     1. SVT  2. AVB s/p PPM  3. Dyslipidemia  4. HTN  Medical Decision Making:  Today's Assessment / Status / Plan:     1. Okay to take digoxin 250 mcg on PRN basis.  Also went over vagal maneuvers to terminate SVT episodes be   2. Device interrogation showed 12 mode switching episodes, all <10 seconds each.  Normal sensing and capture of RA and RV leads; stable impedances. Battery longevity is 8.4-9.1 years  3. Continue Zocor 10 mg qhs.  Check labs one year.  LFTs within normal range.  4. Continue lisinopril for blood pressure.    Follow-up in one year.  Labs prior to next visit.    Thank you for allowing me to participate in taking care of patient.    Luciana Ellington. MD.      Edmond Flower M.D.  [address]  [address]  [facility] [address]  VIA In Basket

## 2017-07-19 NOTE — MR AVS SNAPSHOT
"        Emil Reid   2017 8:40 AM   Non-Provider Visit   MRN: [id number]    Department:  [facility]   Dept Phone:  [phone]    Description:  Male : 1950   Provider:  PAULA Hedrick           Reason for Visit     Pacemaker Check/Dysfunction [place]      Allergies as of 2017     No Known Allergies      You were diagnosed with     Cardiac pacemaker in situ   [V45.01.ICD-9-CM]       Second degree AV block   [341971]         Vital Signs     Blood Pressure Pulse Height Weight Body Mass Index Oxygen Saturation    122/72 mmHg 72 1.753 m (5' 9.02\") 88.905 kg (196 lb) 28.93 kg/m2 96%    Smoking Status                   Never Smoker            Basic Information     Date Of Birth Sex Race Ethnicity Preferred Language    1950 Male White Non- English      Your appointments     Aug 07, 2017  2:00 PM   AORTO/ILIAC COMPLETE with VASCULAR LAB [facility], VASCULAR EXAM ROOM [facility]   NON-INVASIVE LAB [facility] ([facility])    [address]  [place] [address]   [phone]           NPO x  6-8 hours, may take meds. Unless Diabetic, may have light meal.              Problem List              ICD-10-CM Priority Class Noted - Resolved    Hypertension I10 High  2014 - Present    Hyperlipidemia E78.5 High  2014 - Present    GERD (gastroesophageal reflux disease) K21.9 Medium  2014 - Present    Vitamin D insufficiency E55.9 Low  2014 - Present    Hx of skin cancer, basal cell Z85.828 Low  2014 - Present    Pre-diabetes R73.03   2015 - Present    AVNRT (AV hitesh re-entry tachycardia) I47.1 High  2015 - Present    Second degree AV block I44.1 High  2015 - Present    Cardiac pacemaker in situ Z95.0 High  2015 - Present      Health Maintenance        Date Due Completion Dates    IMM ZOSTER VACCINE 2008 ---    IMM PNEUMOCOCCAL 65+ (ADULT) LOW/MEDIUM RISK SERIES (1 of 2 - PCV13) 2013 ---    IMM INFLUENZA (1) 2017, 2015, 10/14/2014   " COLONOSCOPY 5/4/2020 5/4/2010, 5/4/2010 (Done)    Override on 5/4/2010: Done    IMM DTaP/Tdap/Td Vaccine (2 - Td) 1/17/2021 1/17/2011            Current Immunizations     Influenza TIV (IM) 10/14/2014    Influenza Vaccine Adult HD 9/9/2016    Influenza Vaccine Quad Inj (Pf) 9/2/2015    Tdap Vaccine 1/17/2011      Below and/or attached are the medications your provider expects you to take. Review all of your home medications and newly ordered medications with your provider and/or pharmacist. Follow medication instructions as directed by your provider and/or pharmacist. Please keep your medication list with you and share with your provider. Update the information when medications are discontinued, doses are changed, or new medications (including over-the-counter products) are added; and carry medication information at all times in the event of emergency situations     Allergies:  No Known Allergies          Medications  Valid as of: July 19, 2017 - 10:15 AM    Generic Name Brand Name Tablet Size Instructions for use    Ascorbic Acid (Tab) ascorbic acid 500 MG Take 500 mg by mouth every day.        Aspirin (Chew Tab) ASA 81 MG Take 81 mg by mouth every day.        Calcium Carb-Cholecalciferol (Tab) Calcium + D3 600-200 MG-UNIT Take  by mouth.        Cholecalciferol   1,000 Units every day.        Digoxin (Tab) LANOXIN 250 MCG Take 1 Tab by mouth every day.        Lisinopril (Tab) PRINIVIL 10 MG TAKE 1 TABLET BY MOUTH EVERY DAY        Lisinopril (Tab) PRINIVIL 10 MG TAKE 1 TABLET BY MOUTH DAILY        Multiple Vitamins-Minerals   Take  by mouth.        Omega-3 Fatty Acids (Cap) OMEGA 3 FA 1000 MG Take 1,000 mg by mouth 3 times a day, with meals.        Pantoprazole Sodium (Tablet Delayed Response) PROTONIX 40 MG Take 1 Tab by mouth every day.        Simvastatin (Tab) ZOCOR 40 MG Take 1 Tab by mouth every evening.        .                 Medicines prescribed today were sent to:     [id number]  - [address] AT [facility]    [address] [place] [address]    Phone: [phone] Fax: [phone]    Open 24 Hours?: No    [id number] - [place], [place] - [address]    [address] [place] [address]    Phone: [phone] Fax: [phone]    Open 24 Hours?: No      Medication refill instructions:       If your prescription bottle indicates you have medication refills left, it is not necessary to call your provider’s office. Please contact your pharmacy and they will refill your medication.    If your prescription bottle indicates you do not have any refills left, you may request refills at any time through one of the following ways: The online ViaSat system (except Urgent Care), by calling your provider’s office, or by asking your pharmacy to contact your provider’s office with a refill request. Medication refills are processed only during regular business hours and may not be available until the next business day. Your provider may request additional information or to have a follow-up visit with you prior to refilling your medication.   *Please Note: Medication refills are assigned a new Rx number when refilled electronically. Your pharmacy may indicate that no refills were authorized even though a new prescription for the same medication is available at the pharmacy. Please request the medicine by name with the pharmacy before contacting your provider for a refill.           ViaSat Access Code: Activation code not generated  Current ViaSat Status: Active

## 2017-08-07 ENCOUNTER — HOSPITAL ENCOUNTER (OUTPATIENT)
Dept: RADIOLOGY | Facility: MEDICAL CENTER | Age: 67
End: 2017-08-07
Attending: INTERNAL MEDICINE
Payer: MEDICARE

## 2017-08-07 PROCEDURE — 93978 VASCULAR STUDY: CPT

## 2017-08-07 PROCEDURE — 93978 VASCULAR STUDY: CPT | Mod: 26 | Performed by: INTERNAL MEDICINE

## 2017-08-17 ENCOUNTER — PATIENT MESSAGE (OUTPATIENT)
Dept: MEDICAL GROUP | Facility: MEDICAL CENTER | Age: 67
End: 2017-08-17

## 2017-08-17 DIAGNOSIS — I47.19 AVNRT (AV NODAL RE-ENTRY TACHYCARDIA): ICD-10-CM

## 2017-08-17 RX ORDER — DIGOXIN 125 UG/1
TABLET ORAL
Qty: 30 TAB | Refills: 6 | Status: SHIPPED | OUTPATIENT
Start: 2017-08-17 | End: 2017-08-21

## 2017-08-17 RX ORDER — AZITHROMYCIN 250 MG/1
TABLET, FILM COATED ORAL
Qty: 6 TAB | Refills: 0 | Status: SHIPPED | OUTPATIENT
Start: 2017-08-17 | End: 2017-08-22

## 2017-08-17 NOTE — TELEPHONE ENCOUNTER
From: Emil Reid  To: Edmond Flower M.D.  Sent: 8/17/2017 10:59 AM PDT  Subject: Prescription Question    I am traveling to [place] Sept 4 to Oct. 16. I am short 7 Lisinopril pills to get through my trip. Can you advise me as to what to do?   Thank you,  Emil Reid  1950

## 2017-08-21 RX ORDER — DIGOXIN 250 MCG
250 TABLET ORAL DAILY
Qty: 90 TAB | Refills: 0 | Status: SHIPPED | OUTPATIENT
Start: 2017-08-21 | End: 2018-03-06 | Stop reason: SDUPTHER

## 2017-11-15 DIAGNOSIS — K21.9 GASTROESOPHAGEAL REFLUX DISEASE, ESOPHAGITIS PRESENCE NOT SPECIFIED: ICD-10-CM

## 2017-11-15 RX ORDER — PANTOPRAZOLE SODIUM 40 MG/1
40 TABLET, DELAYED RELEASE ORAL
Qty: 90 TAB | Refills: 3 | Status: SHIPPED | OUTPATIENT
Start: 2017-11-15 | End: 2018-02-09

## 2017-11-16 NOTE — TELEPHONE ENCOUNTER
----- Message from Emil Reid sent at 11/15/2017  2:58 PM PST -----  Regarding: Prescription Question  Contact: [phone]  My pantprozole can not be filled by [facility].  Insurance will not cover according to them.  Is it because you need to write a prescription?  Please advise.

## 2017-11-16 NOTE — TELEPHONE ENCOUNTER
Was the patient seen in the last year in this department? Yes     Does patient have an active prescription for medications requested? No     Received Request Via: Patient    Last seen: 03/02/2017

## 2018-01-15 ENCOUNTER — NON-PROVIDER VISIT (OUTPATIENT)
Dept: CARDIOLOGY | Facility: MEDICAL CENTER | Age: 68
End: 2018-01-15
Payer: MEDICARE

## 2018-01-15 VITALS
HEART RATE: 88 BPM | BODY MASS INDEX: 29.18 KG/M2 | OXYGEN SATURATION: 94 % | SYSTOLIC BLOOD PRESSURE: 110 MMHG | DIASTOLIC BLOOD PRESSURE: 78 MMHG | WEIGHT: 197 LBS | HEIGHT: 69 IN

## 2018-01-15 DIAGNOSIS — Z95.0 CARDIAC PACEMAKER IN SITU: ICD-10-CM

## 2018-01-15 DIAGNOSIS — I44.1 SECOND DEGREE AV BLOCK: ICD-10-CM

## 2018-01-15 PROCEDURE — 93288 INTERROG EVL PM/LDLS PM IP: CPT | Performed by: NURSE PRACTITIONER

## 2018-01-15 NOTE — PROGRESS NOTES
Device is working normally. 6 mode switching episodes (all <10 seconds, <1% of total time).  Normal sensing and capture of RA and RV leads; stable impedances. Battery longevity is 8.6-9.1 years.  No changes are made today.    FU in 6 months for next PM check with me, as well as annual follow-up with Dr. Ellington.    Collaborating MD: Josefa

## 2018-01-16 DIAGNOSIS — E78.5 HYPERLIPIDEMIA, UNSPECIFIED HYPERLIPIDEMIA TYPE: ICD-10-CM

## 2018-01-19 RX ORDER — SIMVASTATIN 40 MG
40 TABLET ORAL EVERY EVENING
Qty: 90 TAB | Refills: 1 | Status: SHIPPED | OUTPATIENT
Start: 2018-01-19 | End: 2018-04-30 | Stop reason: SDUPTHER

## 2018-02-08 DIAGNOSIS — K21.9 GASTROESOPHAGEAL REFLUX DISEASE, ESOPHAGITIS PRESENCE NOT SPECIFIED: ICD-10-CM

## 2018-02-09 RX ORDER — PANTOPRAZOLE SODIUM 40 MG/1
TABLET, DELAYED RELEASE ORAL
Qty: 90 TAB | Refills: 0 | Status: SHIPPED | OUTPATIENT
Start: 2018-02-09 | End: 2018-03-14 | Stop reason: SDUPTHER

## 2018-02-20 ENCOUNTER — RESOLUTE PROFESSIONAL BILLING HOSPITAL PROF FEE (OUTPATIENT)
Dept: HOSPITALIST | Facility: MEDICAL CENTER | Age: 68
End: 2018-02-20
Payer: MEDICARE

## 2018-02-20 ENCOUNTER — APPOINTMENT (OUTPATIENT)
Dept: RADIOLOGY | Facility: MEDICAL CENTER | Age: 68
End: 2018-02-20
Attending: EMERGENCY MEDICINE
Payer: MEDICARE

## 2018-02-20 ENCOUNTER — HOSPITAL ENCOUNTER (OUTPATIENT)
Facility: MEDICAL CENTER | Age: 68
End: 2018-02-21
Attending: EMERGENCY MEDICINE | Admitting: HOSPITALIST
Payer: MEDICARE

## 2018-02-20 DIAGNOSIS — R55 SYNCOPE, UNSPECIFIED SYNCOPE TYPE: ICD-10-CM

## 2018-02-20 PROBLEM — E87.6 HYPOKALEMIA: Status: ACTIVE | Noted: 2018-02-20

## 2018-02-20 PROBLEM — F10.10 ALCOHOL ABUSE: Status: ACTIVE | Noted: 2018-02-20

## 2018-02-20 PROBLEM — F12.10 MARIJUANA ABUSE: Status: ACTIVE | Noted: 2018-02-20

## 2018-02-20 LAB
ALBUMIN SERPL BCP-MCNC: 4.2 G/DL (ref 3.2–4.9)
ALBUMIN/GLOB SERPL: 1.5 G/DL
ALP SERPL-CCNC: 66 U/L (ref 30–99)
ALT SERPL-CCNC: 30 U/L (ref 2–50)
AMPHETAMINES UR QL: NEGATIVE
ANION GAP SERPL CALC-SCNC: 6 MMOL/L (ref 0–11.9)
AST SERPL-CCNC: 29 U/L (ref 12–45)
BARBITURATES UR QL SCN: NEGATIVE
BASOPHILS # BLD AUTO: 0.3 % (ref 0–1.8)
BASOPHILS # BLD: 0.03 K/UL (ref 0–0.12)
BENZODIAZ UR QL SCN: NEGATIVE
BILIRUB SERPL-MCNC: 0.5 MG/DL (ref 0.1–1.5)
BUN SERPL-MCNC: 27 MG/DL (ref 8–22)
BZE UR QL SCN: NEGATIVE
CALCIUM SERPL-MCNC: 8.4 MG/DL (ref 8.4–10.2)
CHLORIDE SERPL-SCNC: 111 MMOL/L (ref 96–112)
CO2 SERPL-SCNC: 23 MMOL/L (ref 20–33)
CREAT SERPL-MCNC: 0.91 MG/DL (ref 0.5–1.4)
DIGOXIN SERPL-MCNC: 0.4 NG/ML (ref 0.8–2)
EKG IMPRESSION: NORMAL
EOSINOPHIL # BLD AUTO: 0.08 K/UL (ref 0–0.51)
EOSINOPHIL NFR BLD: 0.9 % (ref 0–6.9)
ERYTHROCYTE [DISTWIDTH] IN BLOOD BY AUTOMATED COUNT: 42.4 FL (ref 35.9–50)
ETHANOL BLD-MCNC: 0.03 G/DL
GLOBULIN SER CALC-MCNC: 2.8 G/DL (ref 1.9–3.5)
GLUCOSE SERPL-MCNC: 98 MG/DL (ref 65–99)
HCT VFR BLD AUTO: 43.5 % (ref 42–52)
HGB BLD-MCNC: 15.2 G/DL (ref 14–18)
IMM GRANULOCYTES # BLD AUTO: 0.02 K/UL (ref 0–0.11)
IMM GRANULOCYTES NFR BLD AUTO: 0.2 % (ref 0–0.9)
LYMPHOCYTES # BLD AUTO: 2.37 K/UL (ref 1–4.8)
LYMPHOCYTES NFR BLD: 26.5 % (ref 22–41)
MCH RBC QN AUTO: 32.1 PG (ref 27–33)
MCHC RBC AUTO-ENTMCNC: 34.9 G/DL (ref 33.7–35.3)
MCV RBC AUTO: 92 FL (ref 81.4–97.8)
MONOCYTES # BLD AUTO: 0.75 K/UL (ref 0–0.85)
MONOCYTES NFR BLD AUTO: 8.4 % (ref 0–13.4)
NEUTROPHILS # BLD AUTO: 5.69 K/UL (ref 1.82–7.42)
NEUTROPHILS NFR BLD: 63.7 % (ref 44–72)
NRBC # BLD AUTO: 0 K/UL
NRBC BLD-RTO: 0 /100 WBC
PCP UR QL SCN: NEGATIVE
PLATELET # BLD AUTO: 209 K/UL (ref 164–446)
PMV BLD AUTO: 9.5 FL (ref 9–12.9)
POTASSIUM SERPL-SCNC: 3.5 MMOL/L (ref 3.6–5.5)
PROT SERPL-MCNC: 7 G/DL (ref 6–8.2)
RBC # BLD AUTO: 4.73 M/UL (ref 4.7–6.1)
SODIUM SERPL-SCNC: 140 MMOL/L (ref 135–145)
TROPONIN I SERPL-MCNC: <0.02 NG/ML (ref 0–0.04)
UR OPIATES 2659: NEGATIVE
UR THC 2511T: NEGATIVE
UR TRICYCLIC 2660: NEGATIVE
WBC # BLD AUTO: 8.9 K/UL (ref 4.8–10.8)

## 2018-02-20 PROCEDURE — 700111 HCHG RX REV CODE 636 W/ 250 OVERRIDE (IP): Performed by: HOSPITALIST

## 2018-02-20 PROCEDURE — 99220 PR INITIAL OBSERVATION CARE,LEVL III: CPT | Performed by: HOSPITALIST

## 2018-02-20 PROCEDURE — G0378 HOSPITAL OBSERVATION PER HR: HCPCS

## 2018-02-20 PROCEDURE — 71045 X-RAY EXAM CHEST 1 VIEW: CPT

## 2018-02-20 PROCEDURE — 80162 ASSAY OF DIGOXIN TOTAL: CPT

## 2018-02-20 PROCEDURE — 94760 N-INVAS EAR/PLS OXIMETRY 1: CPT

## 2018-02-20 PROCEDURE — 85025 COMPLETE CBC W/AUTO DIFF WBC: CPT

## 2018-02-20 PROCEDURE — 36415 COLL VENOUS BLD VENIPUNCTURE: CPT

## 2018-02-20 PROCEDURE — 84484 ASSAY OF TROPONIN QUANT: CPT

## 2018-02-20 PROCEDURE — A9270 NON-COVERED ITEM OR SERVICE: HCPCS | Performed by: HOSPITALIST

## 2018-02-20 PROCEDURE — 700102 HCHG RX REV CODE 250 W/ 637 OVERRIDE(OP): Performed by: HOSPITALIST

## 2018-02-20 PROCEDURE — 80307 DRUG TEST PRSMV CHEM ANLYZR: CPT

## 2018-02-20 PROCEDURE — 80305 DRUG TEST PRSMV DIR OPT OBS: CPT

## 2018-02-20 PROCEDURE — 80053 COMPREHEN METABOLIC PANEL: CPT

## 2018-02-20 PROCEDURE — 93005 ELECTROCARDIOGRAM TRACING: CPT | Performed by: EMERGENCY MEDICINE

## 2018-02-20 PROCEDURE — 99285 EMERGENCY DEPT VISIT HI MDM: CPT

## 2018-02-20 RX ORDER — AMOXICILLIN 250 MG
2 CAPSULE ORAL 2 TIMES DAILY
Status: DISCONTINUED | OUTPATIENT
Start: 2018-02-20 | End: 2018-02-21 | Stop reason: HOSPADM

## 2018-02-20 RX ORDER — OMEPRAZOLE 20 MG/1
20 CAPSULE, DELAYED RELEASE ORAL DAILY
Status: DISCONTINUED | OUTPATIENT
Start: 2018-02-21 | End: 2018-02-21 | Stop reason: HOSPADM

## 2018-02-20 RX ORDER — BISACODYL 10 MG
10 SUPPOSITORY, RECTAL RECTAL
Status: DISCONTINUED | OUTPATIENT
Start: 2018-02-20 | End: 2018-02-21 | Stop reason: HOSPADM

## 2018-02-20 RX ORDER — DIGOXIN 250 MCG
250 TABLET ORAL DAILY
Status: DISCONTINUED | OUTPATIENT
Start: 2018-02-20 | End: 2018-02-21 | Stop reason: HOSPADM

## 2018-02-20 RX ORDER — SIMVASTATIN 20 MG
40 TABLET ORAL EVERY EVENING
Status: DISCONTINUED | OUTPATIENT
Start: 2018-02-20 | End: 2018-02-21 | Stop reason: HOSPADM

## 2018-02-20 RX ORDER — ACETAMINOPHEN 325 MG/1
650 TABLET ORAL EVERY 6 HOURS PRN
Status: DISCONTINUED | OUTPATIENT
Start: 2018-02-20 | End: 2018-02-21 | Stop reason: HOSPADM

## 2018-02-20 RX ORDER — LISINOPRIL 5 MG/1
10 TABLET ORAL
Status: DISCONTINUED | OUTPATIENT
Start: 2018-02-20 | End: 2018-02-21 | Stop reason: HOSPADM

## 2018-02-20 RX ORDER — ASPIRIN 81 MG/1
81 TABLET, CHEWABLE ORAL DAILY
Status: DISCONTINUED | OUTPATIENT
Start: 2018-02-21 | End: 2018-02-21 | Stop reason: HOSPADM

## 2018-02-20 RX ORDER — ONDANSETRON 2 MG/ML
4 INJECTION INTRAMUSCULAR; INTRAVENOUS EVERY 4 HOURS PRN
Status: DISCONTINUED | OUTPATIENT
Start: 2018-02-20 | End: 2018-02-21 | Stop reason: HOSPADM

## 2018-02-20 RX ORDER — POTASSIUM CHLORIDE 20 MEQ/1
40 TABLET, EXTENDED RELEASE ORAL ONCE
Status: COMPLETED | OUTPATIENT
Start: 2018-02-20 | End: 2018-02-20

## 2018-02-20 RX ORDER — HEPARIN SODIUM 5000 [USP'U]/ML
5000 INJECTION, SOLUTION INTRAVENOUS; SUBCUTANEOUS EVERY 8 HOURS
Status: DISCONTINUED | OUTPATIENT
Start: 2018-02-20 | End: 2018-02-21 | Stop reason: HOSPADM

## 2018-02-20 RX ORDER — ONDANSETRON 4 MG/1
4 TABLET, ORALLY DISINTEGRATING ORAL EVERY 4 HOURS PRN
Status: DISCONTINUED | OUTPATIENT
Start: 2018-02-20 | End: 2018-02-21 | Stop reason: HOSPADM

## 2018-02-20 RX ORDER — POLYETHYLENE GLYCOL 3350 17 G/17G
1 POWDER, FOR SOLUTION ORAL
Status: DISCONTINUED | OUTPATIENT
Start: 2018-02-20 | End: 2018-02-21 | Stop reason: HOSPADM

## 2018-02-20 RX ADMIN — POTASSIUM CHLORIDE 40 MEQ: 1500 TABLET, EXTENDED RELEASE ORAL at 21:35

## 2018-02-20 RX ADMIN — DIGOXIN 250 MCG: 250 TABLET ORAL at 21:36

## 2018-02-20 RX ADMIN — HEPARIN SODIUM 5000 UNITS: 5000 INJECTION, SOLUTION INTRAVENOUS; SUBCUTANEOUS at 21:36

## 2018-02-20 RX ADMIN — SIMVASTATIN 40 MG: 20 TABLET, FILM COATED ORAL at 21:35

## 2018-02-20 ASSESSMENT — ENCOUNTER SYMPTOMS
FEVER: 0
LOSS OF CONSCIOUSNESS: 1
NAUSEA: 1
DIZZINESS: 1
DIARRHEA: 0
WHEEZING: 0
HEADACHES: 0
CHILLS: 0
VOMITING: 0
SHORTNESS OF BREATH: 0
CONSTIPATION: 0
COUGH: 0

## 2018-02-20 ASSESSMENT — LIFESTYLE VARIABLES
HAVE PEOPLE ANNOYED YOU BY CRITICIZING YOUR DRINKING: NO
AVERAGE NUMBER OF DAYS PER WEEK YOU HAVE A DRINK CONTAINING ALCOHOL: 2
TOTAL SCORE: 0
HAVE YOU EVER FELT YOU SHOULD CUT DOWN ON YOUR DRINKING: NO
CONSUMPTION TOTAL: POSITIVE
DO YOU DRINK ALCOHOL: YES
EVER HAD A DRINK FIRST THING IN THE MORNING TO STEADY YOUR NERVES TO GET RID OF A HANGOVER: NO
ON A TYPICAL DAY WHEN YOU DRINK ALCOHOL HOW MANY DRINKS DO YOU HAVE: 2
EVER FELT BAD OR GUILTY ABOUT YOUR DRINKING: NO
TOTAL SCORE: 0
TOTAL SCORE: 0
HOW MANY TIMES IN THE PAST YEAR HAVE YOU HAD 5 OR MORE DRINKS IN A DAY: 20

## 2018-02-20 ASSESSMENT — PAIN SCALES - GENERAL
PAINLEVEL_OUTOF10: 0
PAINLEVEL_OUTOF10: 0

## 2018-02-20 ASSESSMENT — PATIENT HEALTH QUESTIONNAIRE - PHQ9
SUM OF ALL RESPONSES TO PHQ9 QUESTIONS 1 AND 2: 0
2. FEELING DOWN, DEPRESSED, IRRITABLE, OR HOPELESS: NOT AT ALL
SUM OF ALL RESPONSES TO PHQ QUESTIONS 1-9: 0
1. LITTLE INTEREST OR PLEASURE IN DOING THINGS: NOT AT ALL

## 2018-02-21 VITALS
WEIGHT: 197.75 LBS | SYSTOLIC BLOOD PRESSURE: 120 MMHG | RESPIRATION RATE: 18 BRPM | HEART RATE: 71 BPM | HEIGHT: 70 IN | DIASTOLIC BLOOD PRESSURE: 70 MMHG | BODY MASS INDEX: 28.31 KG/M2 | OXYGEN SATURATION: 95 % | TEMPERATURE: 98.2 F

## 2018-02-21 LAB
ALBUMIN SERPL BCP-MCNC: 3.6 G/DL (ref 3.2–4.9)
BASOPHILS # BLD AUTO: 0.4 % (ref 0–1.8)
BASOPHILS # BLD: 0.04 K/UL (ref 0–0.12)
BUN SERPL-MCNC: 22 MG/DL (ref 8–22)
CALCIUM SERPL-MCNC: 8.6 MG/DL (ref 8.4–10.2)
CHLORIDE SERPL-SCNC: 109 MMOL/L (ref 96–112)
CO2 SERPL-SCNC: 22 MMOL/L (ref 20–33)
CORTIS SERPL-MCNC: 9.4 UG/DL (ref 0–23)
CREAT SERPL-MCNC: 0.8 MG/DL (ref 0.5–1.4)
EOSINOPHIL # BLD AUTO: 0.07 K/UL (ref 0–0.51)
EOSINOPHIL NFR BLD: 0.8 % (ref 0–6.9)
ERYTHROCYTE [DISTWIDTH] IN BLOOD BY AUTOMATED COUNT: 43.6 FL (ref 35.9–50)
GLUCOSE SERPL-MCNC: 84 MG/DL (ref 65–99)
HCT VFR BLD AUTO: 41.9 % (ref 42–52)
HGB BLD-MCNC: 14.4 G/DL (ref 14–18)
IMM GRANULOCYTES # BLD AUTO: 0.02 K/UL (ref 0–0.11)
IMM GRANULOCYTES NFR BLD AUTO: 0.2 % (ref 0–0.9)
LV EJECT FRACT  99904: 60
LV EJECT FRACT MOD 2C 99903: 63.71
LV EJECT FRACT MOD 4C 99902: 57.08
LV EJECT FRACT MOD BP 99901: 60.5
LYMPHOCYTES # BLD AUTO: 2.31 K/UL (ref 1–4.8)
LYMPHOCYTES NFR BLD: 25.6 % (ref 22–41)
MCH RBC QN AUTO: 31.9 PG (ref 27–33)
MCHC RBC AUTO-ENTMCNC: 34.4 G/DL (ref 33.7–35.3)
MCV RBC AUTO: 92.9 FL (ref 81.4–97.8)
MONOCYTES # BLD AUTO: 1 K/UL (ref 0–0.85)
MONOCYTES NFR BLD AUTO: 11.1 % (ref 0–13.4)
NEUTROPHILS # BLD AUTO: 5.59 K/UL (ref 1.82–7.42)
NEUTROPHILS NFR BLD: 61.9 % (ref 44–72)
NRBC # BLD AUTO: 0 K/UL
NRBC BLD-RTO: 0 /100 WBC
PHOSPHATE SERPL-MCNC: 3.9 MG/DL (ref 2.5–4.5)
PLATELET # BLD AUTO: 220 K/UL (ref 164–446)
PMV BLD AUTO: 9.8 FL (ref 9–12.9)
POTASSIUM SERPL-SCNC: 4.1 MMOL/L (ref 3.6–5.5)
RBC # BLD AUTO: 4.51 M/UL (ref 4.7–6.1)
SODIUM SERPL-SCNC: 139 MMOL/L (ref 135–145)
T4 FREE SERPL-MCNC: 0.81 NG/DL (ref 0.58–1.64)
TSH SERPL DL<=0.005 MIU/L-ACNC: 1 UIU/ML (ref 0.38–5.33)
WBC # BLD AUTO: 9 K/UL (ref 4.8–10.8)

## 2018-02-21 PROCEDURE — 93306 TTE W/DOPPLER COMPLETE: CPT

## 2018-02-21 PROCEDURE — G0378 HOSPITAL OBSERVATION PER HR: HCPCS

## 2018-02-21 PROCEDURE — 99217 PR OBSERVATION CARE DISCHARGE: CPT | Performed by: INTERNAL MEDICINE

## 2018-02-21 PROCEDURE — G8989 SELF CARE D/C STATUS: HCPCS | Mod: CI

## 2018-02-21 PROCEDURE — 80069 RENAL FUNCTION PANEL: CPT

## 2018-02-21 PROCEDURE — 97165 OT EVAL LOW COMPLEX 30 MIN: CPT

## 2018-02-21 PROCEDURE — 84443 ASSAY THYROID STIM HORMONE: CPT

## 2018-02-21 PROCEDURE — 84439 ASSAY OF FREE THYROXINE: CPT

## 2018-02-21 PROCEDURE — G8988 SELF CARE GOAL STATUS: HCPCS | Mod: CI

## 2018-02-21 PROCEDURE — 85025 COMPLETE CBC W/AUTO DIFF WBC: CPT

## 2018-02-21 PROCEDURE — 700102 HCHG RX REV CODE 250 W/ 637 OVERRIDE(OP): Performed by: HOSPITALIST

## 2018-02-21 PROCEDURE — A9270 NON-COVERED ITEM OR SERVICE: HCPCS | Performed by: HOSPITALIST

## 2018-02-21 PROCEDURE — G8987 SELF CARE CURRENT STATUS: HCPCS | Mod: CI

## 2018-02-21 PROCEDURE — 82533 TOTAL CORTISOL: CPT

## 2018-02-21 PROCEDURE — 93306 TTE W/DOPPLER COMPLETE: CPT | Mod: 26 | Performed by: INTERNAL MEDICINE

## 2018-02-21 PROCEDURE — 700111 HCHG RX REV CODE 636 W/ 250 OVERRIDE (IP): Performed by: HOSPITALIST

## 2018-02-21 RX ADMIN — OMEPRAZOLE 20 MG: 20 CAPSULE, DELAYED RELEASE ORAL at 09:06

## 2018-02-21 RX ADMIN — HEPARIN SODIUM 5000 UNITS: 5000 INJECTION, SOLUTION INTRAVENOUS; SUBCUTANEOUS at 06:06

## 2018-02-21 RX ADMIN — ASPIRIN 81 MG 81 MG: 81 TABLET ORAL at 09:06

## 2018-02-21 ASSESSMENT — PAIN SCALES - GENERAL
PAINLEVEL_OUTOF10: 0

## 2018-02-21 ASSESSMENT — ACTIVITIES OF DAILY LIVING (ADL): TOILETING: INDEPENDENT

## 2018-02-21 NOTE — CARE PLAN
Problem: Safety  Goal: Will remain free from injury  Pt encouraged to call for assistance, bed in low position, call light within reach. Treaded slipper socks on pt, mobility assessed (SBA) & appropriate sign placed.    Problem: Knowledge Deficit  Goal: Knowledge of disease process/condition, treatment plan, diagnostic tests, and medications will improve  POC discussed with pt-medications, lab, tests (ECHO), disease process (syncope)-verbalized understanding.

## 2018-02-21 NOTE — PROGRESS NOTES
Pt resting quietly in bed in poc--he repositions self as needed. Eyes closed, resprs even & unlabored, NAD noted. Call light and belongings within reach. No needs identified. Will cont to monitor.

## 2018-02-21 NOTE — CARE PLAN
Problem: Pain Management  Goal: Pain level will decrease to patient's comfort goal  Outcome: PROGRESSING AS EXPECTED  Patient denies pain at this time. Will continue to assess pain and treat as necessary.

## 2018-02-21 NOTE — PROGRESS NOTES
Phone report received from ARRON Perry in the Ed. All questions addressed and poc discussed. Will assume cares when pt arrives.

## 2018-02-21 NOTE — PROGRESS NOTES
Pt awakens to name. Heparin administered. Pt denies complaints, states he slept ok through the night. He denies headache, dizziness, nausea, or any other symptoms at this time. No needs stated or noted. Reminded him he still needs to use call light for activity. Call light and belongings within reach. Will cont to monitor and report to oncoming shift.

## 2018-02-21 NOTE — ASSESSMENT & PLAN NOTE
Syncope  - echo PENDING  - PT/OT  - monitor on tele for any arrhythmias, none seen so far  - TSH/FT4/am cortisol PENDING

## 2018-02-21 NOTE — PROGRESS NOTES
Telemetry Summary    Rhythm: Sporadically Paced (A-paced, V-paced)  Ectopy: Frequent PVC's, Bigeminy, Couplets  Rate: 67  Pr:  0.18  QRS: 0.16  Qt: 0.48

## 2018-02-21 NOTE — ED NOTES
Pt to er via remsa after reported syncopal episode pta. Family states pt had been drinking wine and had Vapor marijuana prior to same. Denies co, initial sbp=80. recvd 1.2L ivf pta as well as asa 324 . Rgnu=480. Reports hx of pacer, monitor = paced at 60.

## 2018-02-21 NOTE — PROGRESS NOTES
Bedside report given to Mohamud Salinas. All questions addressed and poc discussed. Pt resting quietly in poc--resprs even & unlabored, NAD noted. He denies immediate needs. Cares turned over at this time.

## 2018-02-21 NOTE — PROGRESS NOTES
Pt resting awake in bed in poc--resprs even & unlabored, NAD noted. He denies needs or complaints at this time. Call light and belongings within reach. Will cont to monitor.

## 2018-02-21 NOTE — ED NOTES
Seen and examined by erp-orders recvd and reviewed with pt. Blood draw from existing saline lock, denies discomfort or dizziness, aware of pending cxr. followed by dr barriga for cardiology

## 2018-02-21 NOTE — THERAPY
"Occupational Therapy Evaluation completed.   Functional Status:  A&Ox4. Vision- WFL. Good strength BUE/BLE's. Indep with self-cares and functional mobility.  Plan of Care: Patient with no further skilled OT needs in the acute care setting at this time. Pt has no acute PT needs.  Discharge Recommendations:  Equipment: No Equipment Needed.  Currently anticipate no further skilled therapy needs once patient is discharged from the inpatient setting.  see \"Rehab Therapy-Acute\" Patient Summary Report for complete documentation.    "

## 2018-02-21 NOTE — PROGRESS NOTES
Pt arrives to room via gurney and amb to poc in chair. Introduced self to pt. Assesment performed. Vital signs taken and tele applied. Discussed poc with pt and answered any questions. Reviewed orders. Call light and belongings within reach. Oriented pt to room, bed, tv, unit routine, and safety precautions. Also instructed pt on the use of a call light with all activity. Admit profile completed.

## 2018-02-21 NOTE — PROGRESS NOTES
Pt cont to rest in bed in poc--he repositions self as needed. Eyes closed, resprs even & unlabored, NAD noted. No needs identified. Will cont to monitor.

## 2018-02-21 NOTE — CARE PLAN
Problem: Safety  Goal: Will remain free from falls  Outcome: PROGRESSING AS EXPECTED  Patient educated on using call light with all acitvity. Bed 1 assigned. Call light and belongings within reach. Non-skid footwear. Hourly rounding. Appropriate signage on door and wristband.

## 2018-02-21 NOTE — H&P
Hospital Medicine History and Physical      Date of Service  2/20/2018    Chief Complaint  Chief Complaint   Patient presents with   • Syncope       History of Presenting Illness  [place] is a 67 y.o. male w/h/o GERD, AV block status post pacemaker implantation, hyperlipidemia, hypertension who presents with syncope and collapse. [place] He had drank 5-6 gabriela. He had also smoked marijuana oil Chand. It was snowing so he did not play golf. When he went to stand up from his snack he passed out. He did not have any witnessed seizure-like activity. He also did not have any palpitations or chest pain prior to the event. He did have some dizziness as is consistent with alcohol intoxication he said.   Primary Care Physician  Edmond Flower M.D.    Outpatient cards  Dr. Ellington    Code Status  Full code per patient with wife present    Review of Systems  Review of Systems   Constitutional: Negative for chills and fever.   Respiratory: Negative for cough, shortness of breath and wheezing.    Cardiovascular: Negative for chest pain.   Gastrointestinal: Positive for nausea. Negative for constipation, diarrhea and vomiting.   Genitourinary: Negative for dysuria.   Neurological: Positive for dizziness and loss of consciousness. Negative for headaches.     Please see HPI, all other systems were reviewed and are negative (AMA/CMS criteria)   Past Medical History  Past Medical History:   Diagnosis Date   • AV block July 2015    Status post PPM implantation.   • AVNRT (AV hitesh re-entry tachycardia) (CMS-HCC)    • GERD (gastroesophageal reflux disease)     • Heart burn    • Hyperlipemia    • Hypertension    • Indigestion        Surgical History  Past Surgical History:   Procedure Laterality Date   • RECOVERY  7/1/2015    Procedure:  CATH LAB  PM INSERT [facility]  RODRIGUEZ ICD9: 426.13;  Surgeon: Recoveryonly Surgery;  [place] [facility]  [place]:    • PACEMAKER INSERTION  July 2015     [facility][id number] implanted by Dr. Sanchez.   • APPENDECTOMY     • INGUINAL HERNIA REPAIR BILATERAL     • PILONIDAL CYST EXCISION         Medications  No current facility-administered medications on file prior to encounter.      Current Outpatient Prescriptions on File Prior to Encounter   Medication Sig Dispense Refill   • pantoprazole (PROTONIX) 40 MG Tablet Delayed Response TAKE 1 TABLET BY MOUTH EVERY DAY 90 Tab 0   • simvastatin (ZOCOR) 40 MG Tab Take 1 Tab by mouth every evening. 90 Tab 1   • digoxin (LANOXIN) 250 MCG Tab Take 1 Tab by mouth every day. 90 Tab 0   • lisinopril (PRINIVIL) 10 MG Tab TAKE 1 TABLET BY MOUTH DAILY 90 Tab 3   • lisinopril (PRINIVIL) 10 MG Tab TAKE 1 TABLET BY MOUTH EVERY DAY 90 Tab 3   • Calcium Carb-Cholecalciferol (CALCIUM + D3) 600-200 MG-UNIT TABS Take  by mouth.     • docosahexanoic acid (OMEGA 3 FA) 1000 MG CAPS Take 1,000 mg by mouth 3 times a day, with meals.     • Cholecalciferol (VITAMIN D3) 1,000 Units every day.     • Multiple Vitamins-Minerals (CENTRUM SILVER ULTRA MENS PO) Take  by mouth.     • ascorbic acid (ASCORBIC ACID) 500 MG TABS Take 500 mg by mouth every day.     • aspirin (ASA) 81 MG CHEW Take 81 mg by mouth every day.       Family History  Family History   Problem Relation Age of Onset   • Heart Disease Mother    • Diabetes Mother    • Cancer Father 86     Pancreatitic     Social History  Social History   Substance Use Topics   • Smoking status: Never Smoker   • Smokeless tobacco: Former User      Comment: 8-10 years, but quit 35 years ago   • Alcohol use 1.0 oz/week     1 Glasses of wine, 1 Cans of beer per week      Comment: occ       Allergies  No Known Allergies     Physical Exam  Laboratory   Hemodynamics  Temp (24hrs), Av.9 °C (96.6 °F), Min:35.9 °C (96.6 °F), Max:35.9 °C (96.6 °F)   Temperature: 35.9 °C (96.6 °F)  Pulse  Av.6  Min: 58  Max: 69 Heart Rate (Monitored): 69  Blood Pressure : 120/70 (Simultaneous filing. User may not  have seen previous data.), NIBP: 106/47      Respiratory      Respiration: 16, Pulse Oximetry: 95 %             Physical Exam   Constitutional: He appears well-developed and well-nourished.   HENT:   Head: Normocephalic.   Right Ear: External ear normal.   Left Ear: External ear normal.   Nose: Nose normal.   Eyes: Conjunctivae and EOM are normal. Pupils are equal, round, and reactive to light. Right eye exhibits no discharge. Left eye exhibits no discharge. No scleral icterus.   Neck: Neck supple. No tracheal deviation present.   Cardiovascular: Normal rate.  Exam reveals no gallop and no friction rub.    Murmur: possible systolic.  Pulmonary/Chest: No respiratory distress. He has no wheezes. He has no rales.   Abdominal: Soft. He exhibits no distension. There is no tenderness. There is no rebound and no guarding.   Musculoskeletal: He exhibits no edema.   Neurological: He is alert.   Skin: Skin is warm and dry. No rash noted. No erythema.   Psychiatric:   Flat affect       Recent Labs      02/20/18   1900   WBC  8.9   RBC  4.73   HEMOGLOBIN  15.2   HEMATOCRIT  43.5   MCV  92.0   MCH  32.1   MCHC  34.9   RDW  42.4   PLATELETCT  209   MPV  9.5     Recent Labs      02/20/18   1900   SODIUM  140   POTASSIUM  3.5*   CHLORIDE  111   CO2  23   GLUCOSE  98   BUN  27*   CREATININE  0.91   CALCIUM  8.4     Recent Labs      02/20/18   1900   ALTSGPT  30   ASTSGOT  29   ALKPHOSPHAT  66   TBILIRUBIN  0.5   GLUCOSE  98                 Lab Results   Component Value Date    TROPONINI <0.02 02/20/2018       Imaging  DX-CHEST-PORTABLE (1 VIEW)   Final Result      Bronchial wall thickening is nonspecific and could reflect aspiration, acute or chronic bronchitis, viral respiratory infection and/or reactive airways disease        EKG  per my independant read:  QTc: 536, HR: 70, a sensed V paced     Assessment/Plan     I anticipate this patient is appropriate for observation status at this time.    Cardiac pacemaker in situ- (present  on admission)   Assessment & Plan    - ROLLY Sanchez consulted by ERP  - Pacemaker interrogation pending  - Monitor on telemetry        Marijuana abuse- (present on admission)   Assessment & Plan    - U tox pending  - Patient says he did smoke marijuana oil vaporizer        Alcohol abuse- (present on admission)   Assessment & Plan    - Patient [place] 5[place]6 g[place]ela [place]  - Will watch for signs of withdrawal        Hypokalemia- (present on admission)   Assessment & Plan    - Repleting orally        Syncope and collapse- (present on admission)   Assessment & Plan    Syncope  - echo PENDING  - PT/OT  - monitor on tele for any arrhythmias, none seen so far  - TSH/FT4/am cortisol PENDING              Prophylaxis:  sc heparin

## 2018-02-21 NOTE — ED PROVIDER NOTES
ED Provider Note    CHIEF COMPLAINT  Chief Complaint   Patient presents with   • Syncope       [facility]  Edjaja Reid is a 67 y.o. male who presents for evaluation of a syncopal episode. The patient was [place] country [place] when he passed out. He had approximately 5 glasses of wine in about a three-hour period. He does have a cardiac history involving AV hitesh reentrant tachycardia he has a pacemaker. He denies any cough. The patient was also smoking marijuana in conjunction with wine he denies any headache or focal weakness. The episode was observed by his wife. He denies any chest pain.    REVIEW OF SYSTEMS  See HPI for further details. All other systems reviewed and negative except as noted above    PAST MEDICAL HISTORY  Past Medical History:   Diagnosis Date   • AV block July 2015    Status post PPM implantation.   • AVNRT (AV hitesh re-entry tachycardia) (CMS-HCC)    • GERD (gastroesophageal reflux disease)     • Heart burn    • Hyperlipemia    • Hypertension    • Indigestion        FAMILY HISTORY  Family History   Problem Relation Age of Onset   • Heart Disease Mother    • Diabetes Mother    • Cancer Father 86     Pancreatitic       SOCIAL HISTORY  Social History     Social History   • Marital status:      Spouse name: N/A   • Number of children: N/A   • Years of education: N/A     Social History Main Topics   • Smoking status: Never Smoker   • Smokeless tobacco: Former User      Comment: 8-10 years, but quit 35 years ago   • Alcohol use 1.0 oz/week     1 Glasses of wine, 1 Cans of beer per week      Comment: occ   • Drug use: No   • Sexual activity: Yes     Partners: Female, Male     Other Topics Concern   • Not on file     Social History Narrative   • No narrative on file       SURGICAL HISTORY  Past Surgical History:   Procedure Laterality Date   • RECOVERY  7/1/2015    Procedure:  CATH LAB  PM INSERT [facility]ODRIGUEZ ICD9: 426.13;  Surgeon: Recoveryonly Surgery;  [place]  [place] [facility] [place]  Service:    • PACEMAKER INSERTION  July 2015    [employer] [facility] [employer] [facility] #[id number] implanted by Dr. Sanchez.   • APPENDECTOMY     • INGUINAL HERNIA REPAIR BILATERAL     • PILONIDAL CYST EXCISION         CURRENT MEDICATIONS  Home Medications    **Home medications have not yet been reviewed for this encounter**          ALLERGIES  No Known Allergies    PHYSICAL EXAM  VITAL SIGNS: /70 Comment: Simultaneous filing. User may not have seen previous data.  Pulse 60 Comment: Simultaneous filing. User may not have seen previous data.  Resp 18   Ht 1.753 m (5' 9\")   Wt 86.2 kg (190 lb)   BMI 28.06 kg/m²   Constitutional :  Well developed, Well nourished, No acute distress, Non-toxic appearance.   HENT: Head is atraumatic normocephalic slightly dry mucous membranes  Eyes: Normal-appearing nonicteric  Neck: Normal range of motion, No tenderness, Supple, No stridor.   Lymphatic: No cervical adenopathy is noted no supraclavicular adenopathy.   Cardiovascular: Normal heart rate, Normal rhythm, systolic murmur heard No rubs, No gallops.   Thorax & Lungs: Equal breath sounds bilaterally no rales rhonchi  Skin: Warm, Dry, No erythema, No rash.   Abdomen is soft nontender no rebound guarding  Extremities no cyanosis or edema  Neurological the patient is awake alert without focal findings  Psychiatric appropriate mood and affect.    Results for orders placed or performed during the hospital encounter of 02/20/18   CBC WITH DIFFERENTIAL   Result Value Ref Range    WBC 8.9 4.8 - 10.8 K/uL    RBC 4.73 4.70 - 6.10 M/uL    Hemoglobin 15.2 14.0 - 18.0 g/dL    Hematocrit 43.5 42.0 - 52.0 %    MCV 92.0 81.4 - 97.8 fL    MCH 32.1 27.0 - 33.0 pg    MCHC 34.9 33.7 - 35.3 g/dL    RDW 42.4 35.9 - 50.0 fL    Platelet Count 209 164 - 446 K/uL    MPV 9.5 9.0 - 12.9 fL    Neutrophils-Polys 63.70 44.00 - 72.00 %    Lymphocytes 26.50 22.00 - 41.00 %    Monocytes 8.40 0.00 - 13.40 %    Eosinophils 0.90 0.00 - 6.90 %    Basophils 0.30 " 0.00 - 1.80 %    Immature Granulocytes 0.20 0.00 - 0.90 %    Nucleated RBC 0.00 /100 WBC    Neutrophils (Absolute) 5.69 1.82 - 7.42 K/uL    Lymphs (Absolute) 2.37 1.00 - 4.80 K/uL    Monos (Absolute) 0.75 0.00 - 0.85 K/uL    Eos (Absolute) 0.08 0.00 - 0.51 K/uL    Baso (Absolute) 0.03 0.00 - 0.12 K/uL    Immature Granulocytes (abs) 0.02 0.00 - 0.11 K/uL    NRBC (Absolute) 0.00 K/uL   COMP METABOLIC PANEL   Result Value Ref Range    Sodium 140 135 - 145 mmol/L    Potassium 3.5 (L) 3.6 - 5.5 mmol/L    Chloride 111 96 - 112 mmol/L    Co2 23 20 - 33 mmol/L    Anion Gap 6.0 0.0 - 11.9    Glucose 98 65 - 99 mg/dL    Bun 27 (H) 8 - 22 mg/dL    Creatinine 0.91 0.50 - 1.40 mg/dL    Calcium 8.4 8.4 - 10.2 mg/dL    AST(SGOT) 29 12 - 45 U/L    ALT(SGPT) 30 2 - 50 U/L    Alkaline Phosphatase 66 30 - 99 U/L    Total Bilirubin 0.5 0.1 - 1.5 mg/dL    Albumin 4.2 3.2 - 4.9 g/dL    Total Protein 7.0 6.0 - 8.2 g/dL    Globulin 2.8 1.9 - 3.5 g/dL    A-G Ratio 1.5 g/dL   TROPONIN   Result Value Ref Range    Troponin I <0.02 0.00 - 0.04 ng/mL   DIGOXIN   Result Value Ref Range    Digoxin 0.4 (L) 0.8 - 2.0 ng/mL   DIAGNOSTIC ALCOHOL   Result Value Ref Range    Diagnostic Alcohol 0.03 (H) 0.00 g/dL   ESTIMATED GFR   Result Value Ref Range    GFR If African American >60 >60 mL/min/1.73 m 2    GFR If Non African American >60 >60 mL/min/1.73 m 2   EKG (NOW)   Result Value Ref Range    Report       [facility] Emergency Dept.    Test Date:  2018  Pt Name:    ISRA SIMMONS                Department: [facility]  MRN:        [id number]                      Room:       SM-ROOM 4  Gender:     Male                         Technician: MELISSA  :        1950                   Requested By:BELTRAN ROBINS  Order #:    [id number]                    Reading MD: BELTRAN ROBINS MD    Measurements  Intervals                                Axis  Rate:       62                           P:          12  KS:         196                           QRS:        -78  QRSD:       178                          T:          91  QT:         516  QTc:        524    Interpretive Statements  ATRIAL-SENSED VENTRICULAR-PACED RHYTHM  NO FURTHER ANALYSIS ATTEMPTED DUE TO PACED RHYTHM  Compared to ECG 07/02/2015 07:47:55  Sinus rhythm no longer present  First degree AV block no longer present  Myocardial infarct finding no longer present    Electronically Signed On 2- 20:53:52 PST  by ABAD STERLING MD       EKG interpretation the patient's EKG shows an atrial sensed ventricular rhythm. No further analysis due to paced rhythm.    COURSE & MEDICAL DECISION MAKING  Pertinent Labs & Imaging studies reviewed. (See chart for details)  The patient is presenting for evaluation of syncopal episode. I discussed the case with Dr. René Sanchez attending cardiologist who recommended interrogation of the pacemaker. The [employer] [facility] has been called. Syncope is possibly due to malfunctioning pacemaker also may be related to vasovagal been secondary to ingestion of alcohol marijuana he has no neurological findings to indicate a stroke. Patient will be admitted by the hospitalist staff for monitoring. Echocardiogram will be obtained at the request of Dr. Sanchez. Findings s were discussed with hospitalist Dr. Javed    FINAL IMPRESSION  1. Syncope etiology unclear  2.   3.      Electronically signed by: Abad Sterling, 2/20/2018

## 2018-02-21 NOTE — THERAPY
PT contact note:  per OT and RN pt is up self w.o deficits and has no skilled acute PT needs at this time.

## 2018-02-21 NOTE — DISCHARGE INSTRUCTIONS
Discharge Instructions    Discharged to home by car with relative. Discharged via wheelchair, hospital escort: Yes.  Special equipment needed: Not Applicable    Be sure to schedule a follow-up appointment with your primary care doctor or any specialists as instructed.     Discharge Plan:   Influenza Vaccine Indication: Not indicated: Previously immunized this influenza season and > 8 years of age    I understand that a diet low in cholesterol, fat, and sodium is recommended for good health. Unless I have been given specific instructions below for another diet, I accept this instruction as my diet prescription.   Other diet: a low fat and sodium    Special Instructions:   Syncope  Syncope is a medical term for fainting or passing out. This means you lose consciousness and drop to the ground. People are generally unconscious for less than 5 minutes. You may have some muscle twitches for up to 15 seconds before waking up and returning to normal. Syncope occurs more often in older adults, but it can happen to anyone. While most causes of syncope are not dangerous, syncope can be a sign of a serious medical problem. It is important to seek medical care.   CAUSES   Syncope is caused by a sudden drop in blood flow to the brain. The specific cause is often not determined. Factors that can bring on syncope include:  · Taking medicines that lower blood pressure.  · Sudden changes in posture, such as standing up quickly.  · Taking more medicine than prescribed.  · Standing in one place for too long.  · Seizure disorders.  · Dehydration and excessive exposure to heat.  · Low blood sugar (hypoglycemia).  · Straining to have a bowel movement.  · Heart disease, irregular heartbeat, or other circulatory problems.  · Fear, emotional distress, seeing blood, or severe pain.  SYMPTOMS   Right before fainting, you may:  · Feel dizzy or light-headed.  · Feel nauseous.  · See all white or all black in your field of vision.  · Have cold,  clammy skin.  DIAGNOSIS   Your health care provider will ask about your symptoms, perform a physical exam, and perform an electrocardiogram (ECG) to record the electrical activity of your heart. Your health care provider may also perform other heart or blood tests to determine the cause of your syncope which may include:  · Transthoracic echocardiogram (TTE). During echocardiography, sound waves are used to evaluate how blood flows through your heart.  · Transesophageal echocardiogram (ELENA).  · Cardiac monitoring. This allows your health care provider to monitor your heart rate and rhythm in real time.  · Holter monitor. This is a portable device that records your heartbeat and can help diagnose heart arrhythmias. It allows your health care provider to track your heart activity for several days, if needed.  · Stress tests by exercise or by giving medicine that makes the heart beat faster.  TREATMENT   In most cases, no treatment is needed. Depending on the cause of your syncope, your health care provider may recommend changing or stopping some of your medicines.  HOME CARE INSTRUCTIONS  · Have someone stay with you until you feel stable.  · Do not drive, use machinery, or play sports until your health care provider says it is okay.  · Keep all follow-up appointments as directed by your health care provider.  · Lie down right away if you start feeling like you might faint. Breathe deeply and steadily. Wait until all the symptoms have passed.  · Drink enough fluids to keep your urine clear or pale yellow.  · If you are taking blood pressure or heart medicine, get up slowly and take several minutes to sit and then stand. This can reduce dizziness.  SEEK IMMEDIATE MEDICAL CARE IF:   · You have a severe headache.  · You have unusual pain in the chest, abdomen, or back.  · You are bleeding from your mouth or rectum, or you have black or tarry stool.  · You have an irregular or very fast heartbeat.  · You have pain with  breathing.  · You have repeated fainting or seizure-like jerking during an episode.  · You faint when sitting or lying down.  · You have confusion.  · You have trouble walking.  · You have severe weakness.  · You have vision problems.  If you fainted, call your local emergency services (911 in [place] Do not drive yourself to the hospital.      This information is not intended to replace advice given to you by your health care provider. Make sure you discuss any questions you have with your health care provider.     Document Released: 12/18/2006 Document Revised: 05/03/2016 Document Reviewed: 02/15/2013  [employer] Interactive Patient Education ©2016 [employer]      · Is patient discharged on Warfarin / Coumadin?   No     Depression / Suicide Risk    As you are discharged from this [facility] facility, it is important to learn how to keep safe from harming yourself.    Recognize the warning signs:  · Abrupt changes in personality, positive or negative- including increase in energy   · Giving away possessions  · Change in eating patterns- significant weight changes-  positive or negative  · Change in sleeping patterns- unable to sleep or sleeping all the time   · Unwillingness or inability to communicate  · Depression  · Unusual sadness, discouragement and loneliness  · Talk of wanting to die  · Neglect of personal appearance   · Rebelliousness- reckless behavior  · Withdrawal from people/activities they love  · Confusion- inability to concentrate     If you or a loved one observes any of these behaviors or has concerns about self-harm, here's what you can do:  · Talk about it- your feelings and reasons for harming yourself  · Remove any means that you might use to hurt yourself (examples: pills, rope, extension cords, firearm)  · Get professional help from the community (Mental Health, Substance Abuse, psychological counseling)  · Do not be alone:Call your Safe Contact- someone whom you trust who will be there  for you.  · Call your local CRISIS HOTLINE 009-9465 or 814-625-8651  · Call your local Children's Mobile Crisis Response Team [place] (698) 132-5892 or www.PHmHealth  · Call the toll free National Suicide Prevention Hotlines   · National Suicide Prevention Lifeline 237-517-OKTX (1826)  · National [employer] Line Network 800-SUICIDE (090-7209)

## 2018-02-21 NOTE — PROGRESS NOTES
0700 Report received from [facility] nurse, Karen, at bedside. Pt is resting in bed, verbalized understanding of POC (interogation of pacemaker done last night, ECHO scheduled today).    1020 ECHO done at bedside. Monitored heart rhythm is 100% paced (underlying rhythm is sinus), rate 60's.    1505 Discharge instruction given to pt, verbalized understanding. IV removed & tip intact. Cardiac monitor returned. Pt discharged to home with personal belongings  to private car.

## 2018-02-22 NOTE — DISCHARGE SUMMARY
CHIEF COMPLAINT ON ADMISSION  Chief Complaint   Patient presents with   • Syncope       CODE STATUS  Full    HPI & HOSPITAL COURSE  This is a 67 y.o. male With a history of a cardiac pacemaker placed in 2015, marijuana use, alcohol use here with syncope. The patient had tried to golf however, due to snow he was unable to golf. Instead he had several drinks in the clubhouse, he estimates about 5 or 6. He also smoked some marijuana. When he stood up he became suddenly dizzy and passed out. Because of this, and his cardiac history with his pacemaker, he was brought to the emergency department. He was found to have normal vital signs and no evidence of acute cardiac ischemia. He is monitored on telemetry overnight and had no arrhythmias.  Echocardiogram was performed and essentially was normal. TSH was normal as were cortisol levels. His symptoms quickly resolved with mild IV hydration and supportive care. He was counseled on the accommodation of alcohol and marijuana as a possible contributor to syncope especially in the setting of volume depletion.    He had no evidence of cardiac ischemia was stable to discharge.    His regular home medications were continued as previously prescribed.    The patient recovered much more quickly than anticipated on admission.    Therefore, he is discharged in good and stable condition with close outpatient follow-up.    SPECIFIC OUTPATIENT FOLLOW-UP  Follow-up with PCP and cardiology as needed    DISCHARGE PROBLEM LIST  Principal Problem:    Syncope and collapse POA: Yes  Active Problems:    Cardiac pacemaker in situ POA: Yes      Overview: July 2015: [employer]  #[id number] implanted by Dr. Sanchez.    Hypokalemia POA: Yes    Alcohol abuse POA: Yes    Marijuana abuse POA: Yes  Resolved Problems:    * No resolved hospital problems. *      FOLLOW UP  Future Appointments  Date Time Provider Department Center   3/14/2018 2:40 PM Edmond Flower M.D. [facility]   5/2/2018 2:30  PM Valerie Zamora M.D. [facility]   7/20/2018 9:00 AM PACER CHECK-[facility] B [facility] None   7/20/2018 9:30 AM Luciana Ellington M.D. [facility] None     No follow-up provider specified.    MEDICATIONS ON DISCHARGE   Emil Reid   Home Medication Instructions GIN:[id number]    Printed on:02/21/18 1929   Medication Information                      ascorbic acid (ASCORBIC ACID) 500 MG TABS  Take 500 mg by mouth every day.             aspirin (ASA) 81 MG CHEW  Take 81 mg by mouth every day.             Calcium Carb-Cholecalciferol (CALCIUM + D3) 600-200 MG-UNIT TABS  Take  by mouth.             Cholecalciferol (VITAMIN D3)  1,000 Units every day.             digoxin (LANOXIN) 250 MCG Tab  Take 1 Tab by mouth every day.             docosahexanoic acid (OMEGA 3 FA) 1000 MG CAPS  Take 1,000 mg by mouth 3 times a day, with meals.             lisinopril (PRINIVIL) 10 MG Tab  TAKE 1 TABLET BY MOUTH EVERY DAY             Multiple Vitamins-Minerals (CENTRUM SILVER ULTRA MENS PO)  Take  by mouth.             pantoprazole (PROTONIX) 40 MG Tablet Delayed Response  TAKE 1 TABLET BY MOUTH EVERY DAY             simvastatin (ZOCOR) 40 MG Tab  Take 1 Tab by mouth every evening.                 DIET  No orders of the defined types were placed in this encounter.      ACTIVITY  As tolerated.  Weight bearing as tolerated      CONSULTATIONS  None    PROCEDURES  None    LABORATORY  Lab Results   Component Value Date/Time    SODIUM 139 02/21/2018 04:43 AM    POTASSIUM 4.1 02/21/2018 04:43 AM    CHLORIDE 109 02/21/2018 04:43 AM    CO2 22 02/21/2018 04:43 AM    GLUCOSE 84 02/21/2018 04:43 AM    BUN 22 02/21/2018 04:43 AM    CREATININE 0.80 02/21/2018 04:43 AM        Lab Results   Component Value Date/Time    WBC 9.0 02/21/2018 04:43 AM    HEMOGLOBIN 14.4 02/21/2018 04:43 AM    HEMATOCRIT 41.9 (L) 02/21/2018 04:43 AM    PLATELETCT 220 02/21/2018 04:43 AM        Total time of the discharge process exceeds 38 minutes

## 2018-03-06 DIAGNOSIS — I47.19 AVNRT (AV NODAL RE-ENTRY TACHYCARDIA): ICD-10-CM

## 2018-03-06 RX ORDER — DIGOXIN 250 MCG
250 TABLET ORAL
Qty: 90 TAB | Refills: 1 | Status: SHIPPED | OUTPATIENT
Start: 2018-03-06 | End: 2018-04-11 | Stop reason: SDUPTHER

## 2018-03-14 ENCOUNTER — OFFICE VISIT (OUTPATIENT)
Dept: MEDICAL GROUP | Facility: MEDICAL CENTER | Age: 68
End: 2018-03-14
Payer: MEDICARE

## 2018-03-14 VITALS
TEMPERATURE: 98.2 F | BODY MASS INDEX: 28.26 KG/M2 | DIASTOLIC BLOOD PRESSURE: 84 MMHG | RESPIRATION RATE: 14 BRPM | WEIGHT: 197.4 LBS | OXYGEN SATURATION: 95 % | SYSTOLIC BLOOD PRESSURE: 114 MMHG | HEIGHT: 70 IN | HEART RATE: 87 BPM

## 2018-03-14 DIAGNOSIS — E55.9 VITAMIN D INSUFFICIENCY: ICD-10-CM

## 2018-03-14 DIAGNOSIS — Z23 NEED FOR VACCINATION: ICD-10-CM

## 2018-03-14 DIAGNOSIS — I10 ESSENTIAL HYPERTENSION: ICD-10-CM

## 2018-03-14 DIAGNOSIS — K21.9 GASTROESOPHAGEAL REFLUX DISEASE, ESOPHAGITIS PRESENCE NOT SPECIFIED: ICD-10-CM

## 2018-03-14 DIAGNOSIS — Z95.0 CARDIAC PACEMAKER IN SITU: ICD-10-CM

## 2018-03-14 DIAGNOSIS — I44.1 SECOND DEGREE AV BLOCK: ICD-10-CM

## 2018-03-14 DIAGNOSIS — Z00.00 MEDICARE ANNUAL WELLNESS VISIT, SUBSEQUENT: ICD-10-CM

## 2018-03-14 DIAGNOSIS — E78.00 PURE HYPERCHOLESTEROLEMIA: ICD-10-CM

## 2018-03-14 DIAGNOSIS — I47.19 AVNRT (AV NODAL RE-ENTRY TACHYCARDIA): ICD-10-CM

## 2018-03-14 DIAGNOSIS — Z85.828 HX OF SKIN CANCER, BASAL CELL: ICD-10-CM

## 2018-03-14 DIAGNOSIS — R73.03 PRE-DIABETES: ICD-10-CM

## 2018-03-14 DIAGNOSIS — R73.01 ELEVATED FASTING BLOOD SUGAR: ICD-10-CM

## 2018-03-14 PROBLEM — F10.10 ALCOHOL ABUSE: Status: RESOLVED | Noted: 2018-02-20 | Resolved: 2018-03-14

## 2018-03-14 PROBLEM — F12.10 MARIJUANA ABUSE: Status: RESOLVED | Noted: 2018-02-20 | Resolved: 2018-03-14

## 2018-03-14 PROBLEM — E87.6 HYPOKALEMIA: Status: RESOLVED | Noted: 2018-02-20 | Resolved: 2018-03-14

## 2018-03-14 PROBLEM — R55 SYNCOPE AND COLLAPSE: Status: RESOLVED | Noted: 2018-02-20 | Resolved: 2018-03-14

## 2018-03-14 PROCEDURE — G0009 ADMIN PNEUMOCOCCAL VACCINE: HCPCS | Performed by: FAMILY MEDICINE

## 2018-03-14 PROCEDURE — G0439 PPPS, SUBSEQ VISIT: HCPCS | Mod: 25 | Performed by: FAMILY MEDICINE

## 2018-03-14 PROCEDURE — 90732 PPSV23 VACC 2 YRS+ SUBQ/IM: CPT | Performed by: FAMILY MEDICINE

## 2018-03-14 RX ORDER — LISINOPRIL 10 MG/1
10 TABLET ORAL
Qty: 90 TAB | Refills: 3 | Status: SHIPPED | OUTPATIENT
Start: 2018-03-14 | End: 2019-03-26 | Stop reason: SDUPTHER

## 2018-03-14 RX ORDER — PANTOPRAZOLE SODIUM 40 MG/1
40 TABLET, DELAYED RELEASE ORAL
Qty: 90 TAB | Refills: 3 | Status: SHIPPED | OUTPATIENT
Start: 2018-03-14 | End: 2019-03-26 | Stop reason: SDUPTHER

## 2018-03-14 ASSESSMENT — ACTIVITIES OF DAILY LIVING (ADL): BATHING_REQUIRES_ASSISTANCE: 0

## 2018-03-14 ASSESSMENT — PATIENT HEALTH QUESTIONNAIRE - PHQ9: CLINICAL INTERPRETATION OF PHQ2 SCORE: 0

## 2018-03-14 NOTE — PROGRESS NOTES
Chief Complaint   Patient presents with   • Annual Exam         HPI:  Emil Reid is a 67 y.o. here for Medicare Annual Wellness Visit     Patient had an episode of syncope after heavy drinking and marijuana use. Is determined to be related to hypotension. Patient states she has stopped using marijuana and has stopped drinking since the episode one month ago.    Patient Active Problem List    Diagnosis Date Noted   • Cardiac pacemaker in situ 07/07/2015     Priority: High   • Second degree AV block 07/01/2015     Priority: High   • AVNRT (AV hitesh re-entry tachycardia) (CMS-[facility]) 06/22/2015     Priority: High   • Hypertension 01/09/2014     Priority: High   • Hyperlipidemia 01/09/2014     Priority: High   • GERD (gastroesophageal reflux disease) 01/09/2014     Priority: Medium   • Vitamin D insufficiency 01/09/2014     Priority: Low   • Hx of skin cancer, basal cell 01/09/2014     Priority: Low   • Pre-diabetes 04/22/2015       Current Outpatient Prescriptions   Medication Sig Dispense Refill   • pantoprazole (PROTONIX) 40 MG Tablet Delayed Response Take 1 Tab by mouth every day. 90 Tab 3   • lisinopril (PRINIVIL) 10 MG Tab Take 1 Tab by mouth every day. 90 Tab 3   • digoxin (LANOXIN) 250 MCG Tab Take 1 Tab by mouth 1 time daily as needed. 90 Tab 1   • simvastatin (ZOCOR) 40 MG Tab Take 1 Tab by mouth every evening. 90 Tab 1   • Calcium Carb-Cholecalciferol (CALCIUM + D3) 600-200 MG-UNIT TABS Take  by mouth.     • docosahexanoic acid (OMEGA 3 FA) 1000 MG CAPS Take 1,000 mg by mouth 3 times a day, with meals.     • Cholecalciferol (VITAMIN D3) 1,000 Units every day.     • Multiple Vitamins-Minerals (CENTRUM SILVER ULTRA MENS PO) Take  by mouth.     • ascorbic acid (ASCORBIC ACID) 500 MG TABS Take 500 mg by mouth every day.     • aspirin (ASA) 81 MG CHEW Take 81 mg by mouth every day.       No current facility-administered medications for this visit.             Current supplements as per medication list.        Allergies: Patient has no known allergies.    Current social contact/activities: living with wife, playing golf, skiing, daughter and son live in [place]   Patient's perception of their health: good    He  reports that he has never smoked. He has quit using smokeless tobacco. He reports that he drinks about 1.0 oz of alcohol per week . He reports that he uses drugs, including Inhaled.  Counseling given: Not Answered        DPA/Advanced Directive:  Patient has Advanced Directive on file.       ROS:    Gait: Uses no assistive device   Ostomy: no   Other tubes: no   Amputations: no   Chronic oxygen use: no   Last eye exam: 1.5 years ago.   : Denies any urinary leakage during the last 6 months incontinence.         Depression Screening    Little interest or pleasure in doing things?  0 - not at all  Feeling down, depressed , or hopeless? 0 - not at all  Patient Health Questionnaire Score: 0     If depressive symptoms identified deferred to follow up visit unless specifically addressed in assessment and plan.    Interpretation of PHQ-9 Total Score   Score Severity   1-4 No Depression   5-9 Mild Depression   10-14 Moderate Depression   15-19 Moderately Severe Depression   20-27 Severe Depression    Screening for Cognitive Impairment    Three Minute Recall (apple, watch, berry)  3/3    Draw clock face with all 12 numbers set to the hand to show 10 minutes past 11 o'clock  1    Cognitive concerns identified deferred for follow up unless specifically addressed in assessment and plan.    Fall Risk Assessment    Has the patient had two or more falls in the last year or any fall with injury in the last year?  No    Safety Assessment    Throw rugs on floor.  Yes  Handrails on all stairs.  Yes  Good lighting in all hallways.  Yes  Difficulty hearing.  No  Patient counseled about all safety risks that were identified.    Functional Assessment ADLs    Are there any barriers preventing you from cooking for yourself or  meeting nutritional needs?  No.    Are there any barriers preventing you from driving safely or obtaining transportation?  No.    Are there any barriers preventing you from using a telephone or calling for help?  No.    Are there any barriers preventing you from shopping?  No.    Are there any barriers preventing you from taking care of your own finances?  No.    Are there any barriers preventing you from managing your medications?  No.    Are there any barriers preventing you from showering, bathing or dressing yourself?  No.    Are currently engaging any exercise or physical activity?  Yes.       Health Maintenance Summary                IMM ZOSTER VACCINE Overdue 6/16/2008     IMM PNEUMOCOCCAL 65+ (ADULT) LOW/MEDIUM RISK SERIES Overdue 6/16/2013     Annual Wellness Visit Overdue 3/3/2018      Done 3/2/2017 Visit Dx: Medicare annual wellness visit, subsequent     Patient has more history with this topic...    COLONOSCOPY Next Due 5/4/2020      Done 5/4/2010      Patient has more history with this topic...    IMM DTaP/Tdap/Td Vaccine Next Due 1/17/2021      Done 1/17/2011 Imm Admin: Tdap Vaccine          Patient Care Team:  Edmond Flower M.D. as PCP - General (Family Medicine)        Social History   Substance Use Topics   • Smoking status: Never Smoker   • Smokeless tobacco: Former User      Comment: 8-10 years, but quit 35 years ago   • Alcohol use 1.0 oz/week     1 Glasses of wine, 1 Cans of beer per week      Comment: occ     Family History   Problem Relation Age of Onset   • Heart Disease Mother    • Diabetes Mother    • Cancer Father 86     Pancreatitic     He  has a past medical history of AV block (July 2015); AVNRT (AV hitesh re-entry tachycardia) (CMS-[facility]); GERD (gastroesophageal reflux disease) ( ); Heart burn; Hyperlipemia; Hypertension; and Indigestion.   Past Surgical History:   Procedure Laterality Date   • RECOVERY  7/1/2015    Procedure:  CATH LAB  PM INSERT [facility]  RODRIGUEZ ICD9: 426.13;  Surgeon:  "Recoveryonly Surgery;  [place] [facility] [place]  [place]:    • PACEMAKER INSERTION  July 2015    [employer] [facility] DR #[id number] implanted by Dr. Sanchez.   • APPENDECTOMY     • INGUINAL HERNIA REPAIR BILATERAL     • PILONIDAL CYST EXCISION         Exam:     Blood pressure 114/84, pulse 87, temperature 36.8 °C (98.2 °F), resp. rate 14, height 1.778 m (5' 10\"), weight 89.5 kg (197 lb 6.4 oz), SpO2 95 %. Body mass index is 28.32 kg/m².    Constitutional: Alert, no distress.  Skin: Warm, dry, good turgor, no rashes in visible areas.  Eye: Equal, round and reactive, conjunctiva clear, lids normal.  Respiratory: Unlabored respiratory effort, lungs clear to auscultation, no wheezes, no ronchi.  Cardiovascular: Normal S1, S2, no murmur, no edema.  Psych: Alert and oriented x3, normal affect and mood.        Assessment and Plan. The following treatment and monitoring plan is recommended:    1. Medicare annual wellness visit, subsequent  Patient is overall doing fair for his age.  - Annual Wellness Visit - Includes PPPS Subsequent ()    2. AVNRT (AV hitesh re-entry tachycardia) (CMS-[facility])  Continue following with cardiology. Continue with pacemaker.  - Annual Wellness Visit - Includes PPPS Subsequent ()    3. Cardiac pacemaker in situ  Continue following with cardiology.  - Annual Wellness Visit - Includes PPPS Subsequent ()    4. Second degree AV block  Continue following with cardiology.  - Annual Wellness Visit - Includes PPPS Subsequent ()    5. Pre-diabetes  Check labs and message with results.  - Annual Wellness Visit - Includes PPPS Subsequent ()    6. Elevated fasting blood sugar  Check labs and message with results.  - Annual Wellness Visit - Includes PPPS Subsequent ()  - HEMOGLOBIN A1C; Future    7. Pure hypercholesterolemia  Continue current dose of simvastatin. Check labs and message with results.  - Annual Wellness Visit - Includes PPPS Subsequent ()  - COMP " METABOLIC PANEL; Future  - LIPID PROFILE; Future  - TSH WITH REFLEX TO FT4; Future    8. Essential hypertension  Controlled. Continue current dose of blood pressure medication.  - Annual Wellness Visit - Includes PPPS Subsequent ()  - CBC WITH DIFFERENTIAL; Future    9. Hx of skin cancer, basal cell  Continue following with dermatology.  - Annual Wellness Visit - Includes PPPS Subsequent ()    10. Vitamin D insufficiency  Advise vitamin D supplementation.  - Annual Wellness Visit - Includes PPPS Subsequent ()    11. Gastroesophageal reflux disease, esophagitis presence not specified  Controlled. Continue Protonix.  - pantoprazole (PROTONIX) 40 MG Tablet Delayed Response; Take 1 Tab by mouth every day.  Dispense: 90 Tab; Refill: 3  - Annual Wellness Visit - Includes PPPS Subsequent ()    12. Need for vaccination  Patient has received Prevnar 13. We will give patient Pneumovax 23 today.  - PNEUMOCOCCAL POLYSACCHARIDE VACCINE 23-VALENT =>1YO SQ/IM      Services suggested: No services needed at this time  Health Care Screening: Age-appropriate preventive services recommended by USPTF and ACIP covered by Medicare were discussed today. Services ordered if indicated and agreed upon by the patient.  Referrals offered: Community-based lifestyle interventions to reduce health risks and promote self-management and wellness, fall prevention, nutrition, physical activity, tobacco-use cessation, weight loss, and mental health services as per orders if indicated.    Discussion today about general wellness and lifestyle habits:    · Prevent falls and reduce trip hazards; Cautioned about securing or removing rugs.  · Have a working fire alarm and carbon monoxide detector;   · Engage in regular physical activity and social activities       Follow-up: Return in about 1 year (around 3/14/2019) for Annual.

## 2018-03-23 ENCOUNTER — HOSPITAL ENCOUNTER (OUTPATIENT)
Dept: LAB | Facility: MEDICAL CENTER | Age: 68
End: 2018-03-23
Attending: FAMILY MEDICINE
Payer: MEDICARE

## 2018-03-23 DIAGNOSIS — I10 ESSENTIAL HYPERTENSION: ICD-10-CM

## 2018-03-23 DIAGNOSIS — R73.01 ELEVATED FASTING BLOOD SUGAR: ICD-10-CM

## 2018-03-23 DIAGNOSIS — E78.00 PURE HYPERCHOLESTEROLEMIA: ICD-10-CM

## 2018-03-23 LAB
ALBUMIN SERPL BCP-MCNC: 4.3 G/DL (ref 3.2–4.9)
ALBUMIN/GLOB SERPL: 1.5 G/DL
ALP SERPL-CCNC: 70 U/L (ref 30–99)
ALT SERPL-CCNC: 31 U/L (ref 2–50)
ANION GAP SERPL CALC-SCNC: 7 MMOL/L (ref 0–11.9)
AST SERPL-CCNC: 23 U/L (ref 12–45)
BASOPHILS # BLD AUTO: 1.1 % (ref 0–1.8)
BASOPHILS # BLD: 0.07 K/UL (ref 0–0.12)
BILIRUB SERPL-MCNC: 0.9 MG/DL (ref 0.1–1.5)
BUN SERPL-MCNC: 22 MG/DL (ref 8–22)
CALCIUM SERPL-MCNC: 9.3 MG/DL (ref 8.5–10.5)
CHLORIDE SERPL-SCNC: 106 MMOL/L (ref 96–112)
CHOLEST SERPL-MCNC: 145 MG/DL (ref 100–199)
CO2 SERPL-SCNC: 27 MMOL/L (ref 20–33)
CREAT SERPL-MCNC: 0.96 MG/DL (ref 0.5–1.4)
EOSINOPHIL # BLD AUTO: 0.13 K/UL (ref 0–0.51)
EOSINOPHIL NFR BLD: 2 % (ref 0–6.9)
ERYTHROCYTE [DISTWIDTH] IN BLOOD BY AUTOMATED COUNT: 44.1 FL (ref 35.9–50)
EST. AVERAGE GLUCOSE BLD GHB EST-MCNC: 108 MG/DL
GLOBULIN SER CALC-MCNC: 2.8 G/DL (ref 1.9–3.5)
GLUCOSE SERPL-MCNC: 99 MG/DL (ref 65–99)
HBA1C MFR BLD: 5.4 % (ref 0–5.6)
HCT VFR BLD AUTO: 48.2 % (ref 42–52)
HDLC SERPL-MCNC: 52 MG/DL
HGB BLD-MCNC: 16.1 G/DL (ref 14–18)
IMM GRANULOCYTES # BLD AUTO: 0.02 K/UL (ref 0–0.11)
IMM GRANULOCYTES NFR BLD AUTO: 0.3 % (ref 0–0.9)
LDLC SERPL CALC-MCNC: 71 MG/DL
LYMPHOCYTES # BLD AUTO: 2.13 K/UL (ref 1–4.8)
LYMPHOCYTES NFR BLD: 32.6 % (ref 22–41)
MCH RBC QN AUTO: 31.6 PG (ref 27–33)
MCHC RBC AUTO-ENTMCNC: 33.4 G/DL (ref 33.7–35.3)
MCV RBC AUTO: 94.7 FL (ref 81.4–97.8)
MONOCYTES # BLD AUTO: 0.57 K/UL (ref 0–0.85)
MONOCYTES NFR BLD AUTO: 8.7 % (ref 0–13.4)
NEUTROPHILS # BLD AUTO: 3.61 K/UL (ref 1.82–7.42)
NEUTROPHILS NFR BLD: 55.3 % (ref 44–72)
NRBC # BLD AUTO: 0 K/UL
NRBC BLD-RTO: 0 /100 WBC
PLATELET # BLD AUTO: 216 K/UL (ref 164–446)
PMV BLD AUTO: 10.4 FL (ref 9–12.9)
POTASSIUM SERPL-SCNC: 3.9 MMOL/L (ref 3.6–5.5)
PROT SERPL-MCNC: 7.1 G/DL (ref 6–8.2)
RBC # BLD AUTO: 5.09 M/UL (ref 4.7–6.1)
SODIUM SERPL-SCNC: 140 MMOL/L (ref 135–145)
TRIGL SERPL-MCNC: 111 MG/DL (ref 0–149)
TSH SERPL DL<=0.005 MIU/L-ACNC: 1.42 UIU/ML (ref 0.38–5.33)
WBC # BLD AUTO: 6.5 K/UL (ref 4.8–10.8)

## 2018-03-23 PROCEDURE — 84443 ASSAY THYROID STIM HORMONE: CPT

## 2018-03-23 PROCEDURE — 85025 COMPLETE CBC W/AUTO DIFF WBC: CPT

## 2018-03-23 PROCEDURE — 80053 COMPREHEN METABOLIC PANEL: CPT

## 2018-03-23 PROCEDURE — 83036 HEMOGLOBIN GLYCOSYLATED A1C: CPT | Mod: GA

## 2018-03-23 PROCEDURE — 80061 LIPID PANEL: CPT

## 2018-03-23 PROCEDURE — 36415 COLL VENOUS BLD VENIPUNCTURE: CPT

## 2018-04-30 DIAGNOSIS — E78.5 HYPERLIPIDEMIA, UNSPECIFIED HYPERLIPIDEMIA TYPE: ICD-10-CM

## 2018-04-30 RX ORDER — SIMVASTATIN 40 MG
40 TABLET ORAL EVERY EVENING
Qty: 90 TAB | Refills: 3 | Status: SHIPPED | OUTPATIENT
Start: 2018-04-30 | End: 2019-03-26 | Stop reason: SDUPTHER

## 2018-06-18 DIAGNOSIS — I47.19 AVNRT (AV NODAL RE-ENTRY TACHYCARDIA): ICD-10-CM

## 2018-06-18 RX ORDER — DIGOXIN 125 MCG
125 TABLET ORAL DAILY
Qty: 90 TAB | Refills: 3 | Status: SHIPPED | OUTPATIENT
Start: 2018-06-18 | End: 2019-06-17 | Stop reason: SDUPTHER

## 2018-07-16 ENCOUNTER — HOSPITAL ENCOUNTER (OUTPATIENT)
Dept: LAB | Facility: MEDICAL CENTER | Age: 68
End: 2018-07-16
Attending: NURSE PRACTITIONER
Payer: MEDICARE

## 2018-07-16 DIAGNOSIS — I47.19 AVNRT (AV NODAL RE-ENTRY TACHYCARDIA): ICD-10-CM

## 2018-07-16 PROCEDURE — 80162 ASSAY OF DIGOXIN TOTAL: CPT

## 2018-07-16 PROCEDURE — 36415 COLL VENOUS BLD VENIPUNCTURE: CPT

## 2018-07-17 ENCOUNTER — OFFICE VISIT (OUTPATIENT)
Dept: CARDIOLOGY | Facility: MEDICAL CENTER | Age: 68
End: 2018-07-17
Payer: MEDICARE

## 2018-07-17 ENCOUNTER — NON-PROVIDER VISIT (OUTPATIENT)
Dept: CARDIOLOGY | Facility: MEDICAL CENTER | Age: 68
End: 2018-07-17
Payer: MEDICARE

## 2018-07-17 VITALS
WEIGHT: 190 LBS | HEIGHT: 70 IN | SYSTOLIC BLOOD PRESSURE: 118 MMHG | DIASTOLIC BLOOD PRESSURE: 80 MMHG | BODY MASS INDEX: 27.2 KG/M2 | HEART RATE: 80 BPM

## 2018-07-17 VITALS
WEIGHT: 190 LBS | SYSTOLIC BLOOD PRESSURE: 118 MMHG | HEART RATE: 80 BPM | DIASTOLIC BLOOD PRESSURE: 80 MMHG | BODY MASS INDEX: 27.2 KG/M2 | HEIGHT: 70 IN

## 2018-07-17 DIAGNOSIS — I44.1 SECOND DEGREE AV BLOCK: ICD-10-CM

## 2018-07-17 DIAGNOSIS — Z95.0 CARDIAC PACEMAKER IN SITU: ICD-10-CM

## 2018-07-17 DIAGNOSIS — I47.19 AVNRT (AV NODAL RE-ENTRY TACHYCARDIA): ICD-10-CM

## 2018-07-17 DIAGNOSIS — E78.00 PURE HYPERCHOLESTEROLEMIA: ICD-10-CM

## 2018-07-17 DIAGNOSIS — I10 ESSENTIAL HYPERTENSION: ICD-10-CM

## 2018-07-17 LAB — DIGOXIN SERPL-MCNC: 0.3 NG/ML (ref 0.8–2)

## 2018-07-17 PROCEDURE — 93288 INTERROG EVL PM/LDLS PM IP: CPT | Performed by: NURSE PRACTITIONER

## 2018-07-17 PROCEDURE — 99214 OFFICE O/P EST MOD 30 MIN: CPT | Performed by: NURSE PRACTITIONER

## 2018-07-17 ASSESSMENT — ENCOUNTER SYMPTOMS
CHILLS: 0
PALPITATIONS: 0
DIZZINESS: 0
MYALGIAS: 0
SHORTNESS OF BREATH: 0
ABDOMINAL PAIN: 0
COUGH: 0
FEVER: 0
BRUISES/BLEEDS EASILY: 0
NAUSEA: 0
ORTHOPNEA: 0
PND: 0
HEADACHES: 0
LOSS OF CONSCIOUSNESS: 0

## 2018-07-17 NOTE — LETTER
[facility]   [address]   [address]   [place] [address]  Phone: [phone]  Fax: [phone]              Emil Reid  1950    Encounter Date: 7/17/2018    PAULA Martinez          PROGRESS NOTE:  Chief Complaint   Patient presents with   • Follow-Up   • Pacemaker Check/Dysfunction   • Paroxysmal Supraventricular Tachycardia (PSVT)   • HTN (Controlled)   • Hyperlipidemia       Subjective:   Emil Reid is a 68 y.o. male who presents today for six month follow-up for PM check, PSVT, hypertension and hyperlipidemia.    Emil is a 68 year old male with history of second degree heart block with PM since 2015; hypertension and hyperlipidemia, previously followed by Dr. Ellington.    Since the last visit six months ago, he has been stable. No chest pain, pressure or discomfort; no palpitations; no shortness of breath, orthopnea or PND; no dizziness or syncope; no edema. BP has been stable. He took a nice trip to the [place] and [place] last spring.    Past Medical History:   Diagnosis Date   • AV block July 2015    Status post PPM implantation.   • AVNRT (AV hitesh re-entry tachycardia) ([facility])    • GERD (gastroesophageal reflux disease)     • Heart burn    • Hyperlipemia    • Hypertension    • Indigestion      Past Surgical History:   Procedure Laterality Date   • RECOVERY  7/1/2015    Procedure:  CATH LAB  PM INSERT [facility] S[facility] ICD9: 426.13;  Surgeon: Recoveryonly Surgery;  [place] [facility] [place]  Service:    • PACEMAKER INSERTION  July 2015    [employer]  #[id number] implanted by Dr. Sanchez.   • APPENDECTOMY     • INGUINAL HERNIA REPAIR BILATERAL     • PILONIDAL CYST EXCISION       Family History   Problem Relation Age of Onset   • Heart Disease Mother    • Diabetes Mother    • Cancer Father 86     Pancreatitic     Social History     Social History   • Marital status:     Spouse name: N/A   • Number of children: N/A   • Years of education: N/A     Occupational History   • Not on file.     Social History Main Topics   • Smoking status: Never Smoker   • Smokeless tobacco: Former User      Comment: 8-10 years, but quit 35 years ago   • Alcohol use 1.0 oz/week     1 Glasses of wine, 1 Cans of beer per week      Comment: occ   • Drug use: Yes     Types: Inhaled      Comment: marijuana   • Sexual activity: Yes     Partners: Female, Male     Other Topics Concern   • Not on file     Social History Narrative   • No narrative on file     No Known Allergies  Outpatient Encounter Prescriptions as of 7/17/2018   Medication Sig Dispense Refill   • digoxin (LANOXIN) 125 MCG Tab Take 1 Tab by mouth every day. 90 Tab 3   • simvastatin (ZOCOR) 40 MG Tab Take 1 Tab by mouth every evening. 90 Tab 3   • [DISCONTINUED] digoxin (LANOXIN) 250 MCG Tab Take 1 Tab by mouth every day. 5 Tab 0   • pantoprazole (PROTONIX) 40 MG Tablet Delayed Response Take 1 Tab by mouth every day. 90 Tab 3   • lisinopril (PRINIVIL) 10 MG Tab Take 1 Tab by mouth every day. 90 Tab 3   • Calcium Carb-Cholecalciferol (CALCIUM + D3) 600-200 MG-UNIT TABS Take  by mouth.     • docosahexanoic acid (OMEGA 3 FA) 1000 MG CAPS Take 1,000 mg by mouth 3 times a day, with meals.     • Multiple Vitamins-Minerals (CENTRUM SILVER ULTRA MENS PO) Take  by mouth.     • ascorbic acid (ASCORBIC ACID) 500 MG TABS Take 500 mg by mouth every day.     • aspirin (ASA) 81 MG CHEW Take 81 mg by mouth every day.     • [DISCONTINUED] Cholecalciferol (VITAMIN D3) 1,000 Units every day.       No facility-administered encounter medications on file as of 7/17/2018.      Review of Systems   Constitutional: Negative for chills and fever.   HENT: Negative for congestion.    Respiratory: Negative for cough and shortness of breath.    Cardiovascular: Negative for chest pain, palpitations, orthopnea, leg swelling and PND.   Gastrointestinal: Negative for abdominal pain  "and nausea.   Musculoskeletal: Negative for myalgias.   Skin: Negative for rash.   Neurological: Negative for dizziness, loss of consciousness and headaches.   Endo/Heme/Allergies: Does not bruise/bleed easily.        Objective:   /80   Pulse 80   Ht 1.765 m (5' 9.5\")   Wt 86.2 kg (190 lb)   BMI 27.66 kg/m²      Physical Exam   Constitutional: He is oriented to person, place, and time. He appears well-developed and well-nourished.   HENT:   Head: Normocephalic.   Eyes: EOM are normal.   Neck: Normal range of motion. Neck supple. No JVD present.   Cardiovascular: Normal rate, regular rhythm and normal heart sounds.    Pulmonary/Chest: Effort normal and breath sounds normal. No respiratory distress. He has no wheezes. He has no rales.   PM in left chest wall.   Abdominal: Soft. Bowel sounds are normal. He exhibits no distension. There is no tenderness.   Musculoskeletal: Normal range of motion. He exhibits no edema.   Neurological: He is alert and oriented to person, place, and time.   Skin: Skin is warm and dry. No rash noted.   Psychiatric: He has a normal mood and affect.     PM is working normally. Minimal mode switching (11 episodes, all <10 seconds, <1% of total time).    Lab Results   Component Value Date/Time    CHOLSTRLTOT 145 03/23/2018 07:24 AM    LDL 71 03/23/2018 07:24 AM    HDL 52 03/23/2018 07:24 AM    TRIGLYCERIDE 111 03/23/2018 07:24 AM       Lab Results   Component Value Date/Time    SODIUM 140 03/23/2018 07:24 AM    POTASSIUM 3.9 03/23/2018 07:24 AM    CHLORIDE 106 03/23/2018 07:24 AM    CO2 27 03/23/2018 07:24 AM    GLUCOSE 99 03/23/2018 07:24 AM    BUN 22 03/23/2018 07:24 AM    CREATININE 0.96 03/23/2018 07:24 AM    BUNCREATRAT 25 (H) 02/05/2015 07:58 AM     Lab Results   Component Value Date/Time    ALKPHOSPHAT 70 03/23/2018 07:24 AM    ASTSGOT 23 03/23/2018 07:24 AM    ALTSGPT 31 03/23/2018 07:24 AM    TBILIRUBIN 0.9 03/23/2018 07:24 AM      Lab Results   Component Value Date/Time    " WBC 6.5 03/23/2018 07:24 AM    RBC 5.09 03/23/2018 07:24 AM    HEMOGLOBIN 16.1 03/23/2018 07:24 AM    HEMATOCRIT 48.2 03/23/2018 07:24 AM    MCV 94.7 03/23/2018 07:24 AM    MCH 31.6 03/23/2018 07:24 AM    MCHC 33.4 (L) 03/23/2018 07:24 AM    MPV 10.4 03/23/2018 07:24 AM        Assessment:     1. Cardiac pacemaker in situ     2. Second degree AV block     3. AVNRT (AV hitesh re-entry tachycardia) (Former[facility])     4. Essential hypertension     5. Pure hypercholesterolemia         Medical Decision Making:  Today's Assessment / Status / Plan:     1. Second degree heart block with PSVT, with permanent pacemaker. The device is working normally. No changes are made today. Continue with low dose Digoxin 125mcg once daily.    2. Hypertension, treated with Lisinopril. BP is good today.    3. Hyperlipidemia, treated with Zocor. Recent lipid panel was excellent.    Same medications for now, which are renewed. FU in 6 months for next PM check, as well as annual follow-up with Dr. SAVANA Henry to establish care (previous patient of Dr. Ellington). FU sooner if clinical condition changes.    Collaborating MD: Louis Beck

## 2018-07-17 NOTE — PROGRESS NOTES
Chief Complaint   Patient presents with   • Follow-Up   • Pacemaker Check/Dysfunction   • Paroxysmal Supraventricular Tachycardia (PSVT)   • HTN (Controlled)   • Hyperlipidemia       Subjective:   Emil Reid is a 68 y.o. male who presents today for six month follow-up for PM check, PSVT, hypertension and hyperlipidemia.    Emil is a 68 year old male with history of second degree heart block with PM since 2015; hypertension and hyperlipidemia, previously followed by Dr. Ellington.    Since the last visit six months ago, he has been stable. No chest pain, pressure or discomfort; no palpitations; no shortness of breath, orthopnea or PND; no dizziness or syncope; no edema. BP has been stable. He took a nice trip to the [place] and [place] last spring.    Past Medical History:   Diagnosis Date   • AV block July 2015    Status post PPM implantation.   • AVNRT (AV hitesh re-entry tachycardia) ([facility])    • GERD (gastroesophageal reflux disease)     • Heart burn    • Hyperlipemia    • Hypertension    • Indigestion      Past Surgical History:   Procedure Laterality Date   • RECOVERY  7/1/2015    Procedure:  CATH LAB  PM INSERT [facility] ICD9: 426.13;  Surgeon: Recoveryonly Surgery;  Location: SURGERY PRE-POST PROC UNIT [facility] [place];  Service:    • PACEMAKER INSERTION  July 2015    [facility] DR #[id number] implanted by Dr. Sanchez.   • APPENDECTOMY     • INGUINAL HERNIA REPAIR BILATERAL     • PILONIDAL CYST EXCISION       Family History   Problem Relation Age of Onset   • Heart Disease Mother    • Diabetes Mother    • Cancer Father 86     Pancreatitic     Social History     Social History   • Marital status:      Spouse name: N/A   • Number of children: N/A   • Years of education: N/A     Occupational History   • Not on file.     Social History Main Topics   • Smoking status: Never Smoker   • Smokeless tobacco: Former User      Comment: 8-10 years, but quit 35 years ago   • Alcohol use 1.0 oz/week      "1 Glasses of wine, 1 Cans of beer per week      Comment: occ   • Drug use: Yes     Types: Inhaled      Comment: marijuana   • Sexual activity: Yes     Partners: Female, Male     Other Topics Concern   • Not on file     Social History Narrative   • No narrative on file     No Known Allergies  Outpatient Encounter Prescriptions as of 7/17/2018   Medication Sig Dispense Refill   • digoxin (LANOXIN) 125 MCG Tab Take 1 Tab by mouth every day. 90 Tab 3   • simvastatin (ZOCOR) 40 MG Tab Take 1 Tab by mouth every evening. 90 Tab 3   • [DISCONTINUED] digoxin (LANOXIN) 250 MCG Tab Take 1 Tab by mouth every day. 5 Tab 0   • pantoprazole (PROTONIX) 40 MG Tablet Delayed Response Take 1 Tab by mouth every day. 90 Tab 3   • lisinopril (PRINIVIL) 10 MG Tab Take 1 Tab by mouth every day. 90 Tab 3   • Calcium Carb-Cholecalciferol (CALCIUM + D3) 600-200 MG-UNIT TABS Take  by mouth.     • docosahexanoic acid (OMEGA 3 FA) 1000 MG CAPS Take 1,000 mg by mouth 3 times a day, with meals.     • Multiple Vitamins-Minerals (CENTRUM SILVER ULTRA MENS PO) Take  by mouth.     • ascorbic acid (ASCORBIC ACID) 500 MG TABS Take 500 mg by mouth every day.     • aspirin (ASA) 81 MG CHEW Take 81 mg by mouth every day.     • [DISCONTINUED] Cholecalciferol (VITAMIN D3) 1,000 Units every day.       No facility-administered encounter medications on file as of 7/17/2018.      Review of Systems   Constitutional: Negative for chills and fever.   HENT: Negative for congestion.    Respiratory: Negative for cough and shortness of breath.    Cardiovascular: Negative for chest pain, palpitations, orthopnea, leg swelling and PND.   Gastrointestinal: Negative for abdominal pain and nausea.   Musculoskeletal: Negative for myalgias.   Skin: Negative for rash.   Neurological: Negative for dizziness, loss of consciousness and headaches.   Endo/Heme/Allergies: Does not bruise/bleed easily.        Objective:   /80   Pulse 80   Ht 1.765 m (5' 9.5\")   Wt 86.2 kg " (190 lb)   BMI 27.66 kg/m²     Physical Exam   Constitutional: He is oriented to person, place, and time. He appears well-developed and well-nourished.   HENT:   Head: Normocephalic.   Eyes: EOM are normal.   Neck: Normal range of motion. Neck supple. No JVD present.   Cardiovascular: Normal rate, regular rhythm and normal heart sounds.    Pulmonary/Chest: Effort normal and breath sounds normal. No respiratory distress. He has no wheezes. He has no rales.   PM in left chest wall.   Abdominal: Soft. Bowel sounds are normal. He exhibits no distension. There is no tenderness.   Musculoskeletal: Normal range of motion. He exhibits no edema.   Neurological: He is alert and oriented to person, place, and time.   Skin: Skin is warm and dry. No rash noted.   Psychiatric: He has a normal mood and affect.     PM is working normally. Minimal mode switching (11 episodes, all <10 seconds, <1% of total time).    Lab Results   Component Value Date/Time    CHOLSTRLTOT 145 03/23/2018 07:24 AM    LDL 71 03/23/2018 07:24 AM    HDL 52 03/23/2018 07:24 AM    TRIGLYCERIDE 111 03/23/2018 07:24 AM       Lab Results   Component Value Date/Time    SODIUM 140 03/23/2018 07:24 AM    POTASSIUM 3.9 03/23/2018 07:24 AM    CHLORIDE 106 03/23/2018 07:24 AM    CO2 27 03/23/2018 07:24 AM    GLUCOSE 99 03/23/2018 07:24 AM    BUN 22 03/23/2018 07:24 AM    CREATININE 0.96 03/23/2018 07:24 AM    BUNCREATRAT 25 (H) 02/05/2015 07:58 AM     Lab Results   Component Value Date/Time    ALKPHOSPHAT 70 03/23/2018 07:24 AM    ASTSGOT 23 03/23/2018 07:24 AM    ALTSGPT 31 03/23/2018 07:24 AM    TBILIRUBIN 0.9 03/23/2018 07:24 AM      Lab Results   Component Value Date/Time    WBC 6.5 03/23/2018 07:24 AM    RBC 5.09 03/23/2018 07:24 AM    HEMOGLOBIN 16.1 03/23/2018 07:24 AM    HEMATOCRIT 48.2 03/23/2018 07:24 AM    MCV 94.7 03/23/2018 07:24 AM    MCH 31.6 03/23/2018 07:24 AM    MCHC 33.4 (L) 03/23/2018 07:24 AM    MPV 10.4 03/23/2018 07:24 AM        Assessment:      1. Cardiac pacemaker in situ     2. Second degree AV block     3. AVNRT (AV hitesh re-entry tachycardia) (HCC)     4. Essential hypertension     5. Pure hypercholesterolemia         Medical Decision Making:  Today's Assessment / Status / Plan:     1. Second degree heart block with PSVT, with permanent pacemaker. The device is working normally. No changes are made today. Continue with low dose Digoxin 125mcg once daily.    2. Hypertension, treated with Lisinopril. BP is good today.    3. Hyperlipidemia, treated with Zocor. Recent lipid panel was excellent.    Same medications for now, which are renewed. FU in 6 months for next PM check, as well as annual follow-up with Dr. SAVANA Henry to establish care (previous patient of Dr. Ellington). FU sooner if clinical condition changes.    Collaborating MD: Louis

## 2018-09-18 ENCOUNTER — OFFICE VISIT (OUTPATIENT)
Dept: DERMATOLOGY | Facility: IMAGING CENTER | Age: 68
End: 2018-09-18
Payer: MEDICARE

## 2018-09-18 VITALS — HEIGHT: 70 IN | TEMPERATURE: 97.1 F | WEIGHT: 187 LBS | BODY MASS INDEX: 26.77 KG/M2

## 2018-09-18 DIAGNOSIS — D22.9 MULTIPLE NEVI: ICD-10-CM

## 2018-09-18 DIAGNOSIS — D18.01 CHERRY ANGIOMA: ICD-10-CM

## 2018-09-18 DIAGNOSIS — Z85.828 HISTORY OF BASAL CELL CARCINOMA: ICD-10-CM

## 2018-09-18 DIAGNOSIS — D23.9 DERMATOFIBROMA: ICD-10-CM

## 2018-09-18 PROCEDURE — 99203 OFFICE O/P NEW LOW 30 MIN: CPT | Performed by: DERMATOLOGY

## 2018-09-18 ASSESSMENT — ENCOUNTER SYMPTOMS
FEVER: 0
CHILLS: 0

## 2018-09-18 NOTE — PROGRESS NOTES
Dermatology New Patient Visit    Chief Complaint   Patient presents with   • Establish Care       Subjective:     HPI:   Emil Reid is a 68 y.o. male presenting for    Full skin exam  No areas of concern today, does not monitor very closely  Brown spots on the body  No itching/bleeding/pain  No increase in size  No treatments     Last exam a year ago   History of skin cancer: Yes, Details:  BCC left cheek 20 years ago   History of biopsies:Yes, Details:    History of blistering/severe sunburns:No  Family history of skin cancer:No  Family history of atypical moles:No        Past Medical History:   Diagnosis Date   • AV block July 2015    Status post PPM implantation.   • AVNRT (AV hitesh re-entry tachycardia) ([facility])    • GERD (gastroesophageal reflux disease)     • Heart burn    • Hyperlipemia    • Hypertension    • Indigestion        Current Outpatient Prescriptions on File Prior to Visit   Medication Sig Dispense Refill   • digoxin (LANOXIN) 125 MCG Tab Take 1 Tab by mouth every day. 90 Tab 3   • simvastatin (ZOCOR) 40 MG Tab Take 1 Tab by mouth every evening. 90 Tab 3   • pantoprazole (PROTONIX) 40 MG Tablet Delayed Response Take 1 Tab by mouth every day. 90 Tab 3   • lisinopril (PRINIVIL) 10 MG Tab Take 1 Tab by mouth every day. 90 Tab 3   • Calcium Carb-Cholecalciferol (CALCIUM + D3) 600-200 MG-UNIT TABS Take  by mouth.     • docosahexanoic acid (OMEGA 3 FA) 1000 MG CAPS Take 1,000 mg by mouth 3 times a day, with meals.     • Multiple Vitamins-Minerals (CENTRUM SILVER ULTRA MENS PO) Take  by mouth.     • ascorbic acid (ASCORBIC ACID) 500 MG TABS Take 500 mg by mouth every day.     • aspirin (ASA) 81 MG CHEW Take 81 mg by mouth every day.       No current facility-administered medications on file prior to visit.        No Known Allergies    Family History   Problem Relation Age of Onset   • Heart Disease Mother    • Diabetes Mother    • Cancer Father 86        Pancreatitic       Social History     Social  History   • Marital status:      Spouse name: N/A   • Number of children: N/A   • Years of education: N/A     Occupational History   • Not on file.     Social History Main Topics   • Smoking status: Never Smoker   • Smokeless tobacco: Former User      Comment: 8-10 years, but quit 35 years ago   • Alcohol use 1.0 oz/week     1 Glasses of wine, 1 Cans of beer per week      Comment: occ   • Drug use: Yes     Types: Inhaled      Comment: marijuana   • Sexual activity: Yes     Partners: Female, Male     Other Topics Concern   • Not on file     Social History Narrative   • No narrative on file       Review of Systems   Constitutional: Negative for chills and fever.   Skin: Negative for itching and rash.   All other systems reviewed and are negative.       Objective:     A full mucocutaneous exam was completed including: scalp, hair, ears, face, eyelids, conjunctiva, lips, gums/tongue/oropharynx, neck, chest, abdomen, back, left and right upper extremities (including hands/digits and fingernails), left and right lower extremities (including feet/toes, toenails), buttocks, excluding external genitalia (patient refusal) with the following pertinent findings listed below. Remaining above-listed examined areas within normal limits / negative for rashes or lesions.    There were no vitals taken for this visit.    Physical Exam   Constitutional: He is oriented to person, place, and time and well-developed, well-nourished, and in no distress.   HENT:   Head: Normocephalic and atraumatic.       Right Ear: External ear normal.   Left Ear: External ear normal.   Nose: Nose normal.   Mouth/Throat: Oropharynx is clear and moist.   Eyes: Conjunctivae and lids are normal.   Neck: Normal range of motion. Neck supple.   Cardiovascular: Intact distal pulses.    Pulmonary/Chest: Effort normal.   Neurological: He is alert and oriented to person, place, and time.   Skin: Skin is warm and dry.        Psychiatric: Mood and affect  normal.   Vitals reviewed.      DATA: none applicable to review    Assessment and Plan:     1. Multiple nevi  - Benign-appearing nature of lesions discussed. Advised to return to clinic for any new or concerning changes.  - ABCDE's of melanoma discussed    2. Cherry angiomas  - Benign-appearing nature of lesions discussed. Advised to return to clinic for any new or concerning changes.    3. Dermatofibroma  - Benign-appearing nature of lesions discussed. Advised to return to clinic for any new or concerning changes.    4. History of basal cell carcinoma  Skin cancer education  - discussed importance of sun protective clothing, eyewear  - discussed importance of daily use of broad spectrum sunscreen with SPF 30 or greater, as well as need for reapplication ~every 2 hours when exposed to UVR  - discussed importance of regular self-exams, ideally once per month, every 12 months exams in clinic  - ABCDE's of melanoma discussed  - patient to bring any new or concerning lesions to my attention    Followup: Return in about 1 year (around 9/18/2019).    Valerie Zamora M.D.

## 2018-12-29 ENCOUNTER — OFFICE VISIT (OUTPATIENT)
Dept: URGENT CARE | Facility: MEDICAL CENTER | Age: 68
End: 2018-12-29
Payer: MEDICARE

## 2018-12-29 VITALS
WEIGHT: 196.2 LBS | TEMPERATURE: 98.2 F | OXYGEN SATURATION: 95 % | SYSTOLIC BLOOD PRESSURE: 130 MMHG | HEIGHT: 70 IN | DIASTOLIC BLOOD PRESSURE: 90 MMHG | BODY MASS INDEX: 28.09 KG/M2 | HEART RATE: 78 BPM

## 2018-12-29 DIAGNOSIS — J22 LRTI (LOWER RESPIRATORY TRACT INFECTION): ICD-10-CM

## 2018-12-29 PROCEDURE — 99214 OFFICE O/P EST MOD 30 MIN: CPT | Performed by: FAMILY MEDICINE

## 2018-12-29 RX ORDER — DOXYCYCLINE HYCLATE 100 MG
100 TABLET ORAL 2 TIMES DAILY
Qty: 14 TAB | Refills: 0 | Status: SHIPPED | OUTPATIENT
Start: 2018-12-29 | End: 2019-01-05

## 2018-12-30 NOTE — PROGRESS NOTES
Chief Complaint   Patient presents with   • Cough     deep in chest/headache X2 weeks              Cough  This is a new problem. The current episode started 2 weeks ago. The problem has been gradually worsening. The problem occurs constantly. The cough is productive of sputum. Associated symptoms include ear congestion, ear pain and nasal congestion. Pertinent negatives include no fever, headaches, sweats, weight loss or wheezing. Nothing aggravates the symptoms.  Patient has tried nothing for the symptoms. There is no history of asthma.     Social History   Substance Use Topics   • Smoking status: Never Smoker   • Smokeless tobacco: Former User      Comment: 8-10 years, but quit 35 years ago   • Alcohol use No      Comment: occ         Current Outpatient Prescriptions on File Prior to Visit   Medication Sig Dispense Refill   • digoxin (LANOXIN) 125 MCG Tab Take 1 Tab by mouth every day. 90 Tab 3   • simvastatin (ZOCOR) 40 MG Tab Take 1 Tab by mouth every evening. 90 Tab 3   • pantoprazole (PROTONIX) 40 MG Tablet Delayed Response Take 1 Tab by mouth every day. 90 Tab 3   • lisinopril (PRINIVIL) 10 MG Tab Take 1 Tab by mouth every day. 90 Tab 3   • Calcium Carb-Cholecalciferol (CALCIUM + D3) 600-200 MG-UNIT TABS Take  by mouth.     • docosahexanoic acid (OMEGA 3 FA) 1000 MG CAPS Take 1,000 mg by mouth 3 times a day, with meals.     • Multiple Vitamins-Minerals (CENTRUM SILVER ULTRA MENS PO) Take  by mouth.     • ascorbic acid (ASCORBIC ACID) 500 MG TABS Take 500 mg by mouth every day.     • aspirin (ASA) 81 MG CHEW Take 81 mg by mouth every day.       No current facility-administered medications on file prior to visit.             Past Medical History:   Diagnosis Date   • AV block July 2015    Status post PPM implantation.   • AVNRT (AV hitesh re-entry tachycardia) ([facility])    • GERD (gastroesophageal reflux disease)     • Heart burn    • Hyperlipemia    • Hypertension    • Indigestion            Review of Systems  "  Constitutional: Negative for fever and weight loss.   HENT: negative for ear pain.    Cardiovascular - denies chest pain or dyspnea  Respiratory: Positive for cough.  Cough is productive.  Negative for wheezing.    Neurological: Negative for headaches.   GI - denies nausea, vomiting or diarrhea  Neuro - denies numbness or tingling.            Objective:     Blood pressure 130/90, pulse 78, temperature 36.8 °C (98.2 °F), temperature source Temporal, height 1.765 m (5' 9.5\"), weight 89 kg (196 lb 3.2 oz), SpO2 95 %.    Physical Exam   Constitutional: patient is oriented to person, place, and time. Patient appears well-developed and well-nourished. No distress.   HENT:   Head: Normocephalic and atraumatic.   Right Ear: External ear normal.   Left Ear: External ear normal.   Nose: Mucosal edema and rhinorrhea present. Right sinus exhibits no maxillary sinus tenderness. Left sinus exhibits no maxillary sinus tenderness.   Mouth/Throat: Mucous membranes are normal. No oral lesions. Posterior oropharyngeal erythema present. No oropharyngeal exudate or posterior oropharyngeal edema.   Eyes: Conjunctivae and EOM are normal. Pupils are equal, round, and reactive to light. Right eye exhibits no discharge. Left eye exhibits no discharge. No scleral icterus.   Neck: Normal range of motion. Neck supple. No tracheal deviation present.   Cardiovascular: Normal rate, regular rhythm and normal heart sounds.  Exam reveals no friction rub.    Pulmonary/Chest: Effort normal. No respiratory distress. Patient has no wheezes. Patient has rhonchi. Patient has no rales.    Musculoskeletal:  exhibits no edema.   Lymphadenopathy:   No cervical LAD  Neurological: patient is alert and oriented to person, place, and time.   Skin: Skin is warm and dry. No rash noted. No erythema.   Psychiatric: patient  has a normal mood and affect.  behavior is normal.   Nursing note and vitals reviewed.              Assessment/Plan:         1. LRTI (lower " respiratory tract infection)     - doxycycline (VIBRAMYCIN) 100 MG Tab; Take 1 Tab by mouth 2 times a day for 7 days.  Dispense: 14 Tab; Refill: 0    Supportive care, differential diagnoses, and indications for immediate follow-up discussed with patient.   Pathogenesis of diagnosis discussed including typical length and natural progression.   Instructed to return to clinic or nearest emergency department for any change in condition, further concerns, or worsening of symptoms.  Patient states understanding of the plan of care and discharge instructions.

## 2019-01-08 ENCOUNTER — NON-PROVIDER VISIT (OUTPATIENT)
Dept: CARDIOLOGY | Facility: MEDICAL CENTER | Age: 69
End: 2019-01-08
Payer: MEDICARE

## 2019-01-08 ENCOUNTER — OFFICE VISIT (OUTPATIENT)
Dept: CARDIOLOGY | Facility: MEDICAL CENTER | Age: 69
End: 2019-01-08
Payer: MEDICARE

## 2019-01-08 VITALS
BODY MASS INDEX: 28.2 KG/M2 | WEIGHT: 197 LBS | SYSTOLIC BLOOD PRESSURE: 118 MMHG | DIASTOLIC BLOOD PRESSURE: 90 MMHG | OXYGEN SATURATION: 93 % | HEART RATE: 82 BPM | HEIGHT: 70 IN

## 2019-01-08 VITALS
WEIGHT: 197 LBS | BODY MASS INDEX: 28.2 KG/M2 | SYSTOLIC BLOOD PRESSURE: 118 MMHG | HEART RATE: 82 BPM | OXYGEN SATURATION: 93 % | HEIGHT: 70 IN | DIASTOLIC BLOOD PRESSURE: 90 MMHG

## 2019-01-08 DIAGNOSIS — Z95.0 CARDIAC PACEMAKER IN SITU: ICD-10-CM

## 2019-01-08 DIAGNOSIS — I47.19 AVNRT (AV NODAL RE-ENTRY TACHYCARDIA): ICD-10-CM

## 2019-01-08 DIAGNOSIS — E78.00 PURE HYPERCHOLESTEROLEMIA: ICD-10-CM

## 2019-01-08 DIAGNOSIS — I44.1 SECOND DEGREE AV BLOCK: ICD-10-CM

## 2019-01-08 DIAGNOSIS — I10 ESSENTIAL HYPERTENSION: ICD-10-CM

## 2019-01-08 DIAGNOSIS — R73.03 PRE-DIABETES: ICD-10-CM

## 2019-01-08 PROCEDURE — 93280 PM DEVICE PROGR EVAL DUAL: CPT | Performed by: NURSE PRACTITIONER

## 2019-01-08 PROCEDURE — 99214 OFFICE O/P EST MOD 30 MIN: CPT | Performed by: INTERNAL MEDICINE

## 2019-01-08 ASSESSMENT — ENCOUNTER SYMPTOMS
GASTROINTESTINAL NEGATIVE: 1
FEVER: 0
HEMOPTYSIS: 0
CONSTITUTIONAL NEGATIVE: 1
PALPITATIONS: 0
PND: 0
SORE THROAT: 0
BRUISES/BLEEDS EASILY: 0
CARDIOVASCULAR NEGATIVE: 1
WEAKNESS: 0
CHILLS: 0
RESPIRATORY NEGATIVE: 1
SHORTNESS OF BREATH: 0
DIZZINESS: 0
EYES NEGATIVE: 1
COUGH: 0
WHEEZING: 0
STRIDOR: 0
NEUROLOGICAL NEGATIVE: 1
CLAUDICATION: 0
LOSS OF CONSCIOUSNESS: 0
SPUTUM PRODUCTION: 0
ORTHOPNEA: 0
MUSCULOSKELETAL NEGATIVE: 1

## 2019-01-08 NOTE — LETTER
[facility]   [address]   [address]  [place] [address]  Phone: [phone]  Fax: [phone]              Emil Reid  1950    Encounter Date: 1/8/2019    Nicholas Miguel M.D.          PROGRESS NOTE:  Chief Complaint   Patient presents with   • HTN (Controlled)       Subjective:   Emil Reid is a 68 y.o. male who presents today as a follow-up for his AVNRT as well as his permanent pacemaker and hypertension.  Since he was last seen his pacemaker is doing fine.  Is not any palpitations PND orthopnea.  His blood pressure is controlled.  He has essentially no complaints.    Past Medical History:   Diagnosis Date   • AV block July 2015    Status post PPM implantation.   • AVNRT (AV hitesh re-entry tachycardia) (H[facility])    • GERD (gastroesophageal reflux disease)     • Heart burn    • Hyperlipemia    • Hypertension    • Indigestion      Past Surgical History:   Procedure Laterality Date   • RECOVERY  7/1/2015    Procedure:  CATH LAB  PM INSERT [facility]JU[facility] SANCHEZ ICD9: 426.13;  Surgeon: [facility];  [place] [facility] [place]  Service:    • PACEMAKER INSERTION  July 2015    [facility] [employer] [facility] #[id number] implanted by Dr. Sanchez.   • APPENDECTOMY     • INGUINAL HERNIA REPAIR BILATERAL     • PILONIDAL CYST EXCISION       Family History   Problem Relation Age of Onset   • Heart Disease Mother    • Diabetes Mother    • Cancer Father 86        Pancreatitic     Social History     Social History   • Marital status:      Spouse name: N/A   • Number of children: N/A   • Years of education: N/A     Occupational History   • Not on file.     Social History Main Topics   • Smoking status: Never Smoker   • Smokeless tobacco: Former User      Comment: 8-10 years, but quit 35 years ago   • Alcohol use No      Comment: occ   • Drug use: No      Comment: marijuana   • Sexual activity: Yes     Partners: Female, Male    "    Other Topics Concern   • Not on file     Social History Narrative   • No narrative on file     No Known Allergies  Outpatient Encounter Prescriptions as of 1/8/2019   Medication Sig Dispense Refill   • digoxin (LANOXIN) 125 MCG Tab Take 1 Tab by mouth every day. 90 Tab 3   • simvastatin (ZOCOR) 40 MG Tab Take 1 Tab by mouth every evening. 90 Tab 3   • pantoprazole (PROTONIX) 40 MG Tablet Delayed Response Take 1 Tab by mouth every day. 90 Tab 3   • lisinopril (PRINIVIL) 10 MG Tab Take 1 Tab by mouth every day. 90 Tab 3   • Calcium Carb-Cholecalciferol (CALCIUM + D3) 600-200 MG-UNIT TABS Take  by mouth.     • docosahexanoic acid (OMEGA 3 FA) 1000 MG CAPS Take 1,000 mg by mouth 3 times a day, with meals.     • Multiple Vitamins-Minerals (CENTRUM SILVER ULTRA MENS PO) Take  by mouth.     • ascorbic acid (ASCORBIC ACID) 500 MG TABS Take 500 mg by mouth every day.     • aspirin (ASA) 81 MG CHEW Take 81 mg by mouth every day.       No facility-administered encounter medications on file as of 1/8/2019.      Review of Systems   Constitutional: Negative.  Negative for chills, fever and malaise/fatigue.   HENT: Negative.  Negative for sore throat.    Eyes: Negative.    Respiratory: Negative.  Negative for cough, hemoptysis, sputum production, shortness of breath, wheezing and stridor.    Cardiovascular: Negative.  Negative for chest pain, palpitations, orthopnea, claudication, leg swelling and PND.   Gastrointestinal: Negative.    Genitourinary: Negative.    Musculoskeletal: Negative.    Skin: Negative.    Neurological: Negative.  Negative for dizziness, loss of consciousness and weakness.   Endo/Heme/Allergies: Negative.  Does not bruise/bleed easily.   All other systems reviewed and are negative.       Objective:   /90 (BP Location: Left arm, Patient Position: Sitting, BP Cuff Size: Adult)   Pulse 82   Ht 1.765 m (5' 9.5\")   Wt 89.4 kg (197 lb)   SpO2 93%   BMI 28.67 kg/m²      Physical Exam   Constitutional: " He appears well-developed and well-nourished. No distress.   HENT:   Head: Normocephalic and atraumatic.   Right Ear: External ear normal.   Left Ear: External ear normal.   Nose: Nose normal.   Mouth/Throat: No oropharyngeal exudate.   Eyes: Pupils are equal, round, and reactive to light. Conjunctivae and EOM are normal. Right eye exhibits no discharge. Left eye exhibits no discharge. No scleral icterus.   Neck: Neck supple. No JVD present.   Cardiovascular: Normal rate, regular rhythm and intact distal pulses.  Exam reveals no gallop and no friction rub.    No murmur heard.  Pulmonary/Chest: Effort normal. No stridor. No respiratory distress. He has no wheezes. He has no rales. He exhibits no tenderness.   Abdominal: Soft. He exhibits no distension. There is no guarding.   Musculoskeletal: Normal range of motion. He exhibits no edema, tenderness or deformity.   Neurological: He is alert. He has normal reflexes. He displays normal reflexes. No cranial nerve deficit. He exhibits normal muscle tone. Coordination normal.   Skin: Skin is warm and dry. No rash noted. He is not diaphoretic. No erythema. No pallor.   Psychiatric: He has a normal mood and affect. His behavior is normal. Judgment and thought content normal.   Nursing note and vitals reviewed.      Assessment:     1. AVNRT (AV hitesh re-entry tachycardia) (HCC)     2. Cardiac pacemaker in situ     3. Pure hypercholesterolemia     4. Essential hypertension     5. Second degree AV block     6. Pre-diabetes         Medical Decision Making:  Today's Assessment / Status / Plan:     68-year-old male with AVNRT status post ablation with a pacemaker and high blood pressure.  All of his risk factors are well controlled.  There will be no changes to his medical therapy today.  We can see him back in 1 year.    Thank for you allowing me to take part in your patient's care, please call should you have any questions or would like to discuss this patient.      [place]  ADONIS Flower  [id number]  [id number]  [facility] [id number]  VIA In Basket

## 2019-01-08 NOTE — PROGRESS NOTES
Device is working normally. 15 mode switching episodes, all <10 seconds (<1% of total time).  Normal sensing and capture of RA and RV leads; stable impedances. Battery longevity is 8.5-9.0 years.  No changes are made today.    FU in 6 months for next PM check with me.    He does see Dr. Miguel today too.    Collaborating MD: Myriam

## 2019-01-08 NOTE — PROGRESS NOTES
Chief Complaint   Patient presents with   • HTN (Controlled)       Subjective:   Emil Reid is a 68 y.o. male who presents today as a follow-up for his AVNRT as well as his permanent pacemaker and hypertension.  Since he was last seen his pacemaker is doing fine.  Is not any palpitations PND orthopnea.  His blood pressure is controlled.  He has essentially no complaints.    Past Medical History:   Diagnosis Date   • AV block July 2015    Status post PPM implantation.   • AVNRT (AV hitesh re-entry tachycardia) (H[facility])    • GERD (gastroesophageal reflux disease)     • Heart burn    • Hyperlipemia    • Hypertension    • Indigestion      Past Surgical History:   Procedure Laterality Date   • RECOVERY  7/1/2015    Procedure:  CATH LAB  PM INSERT [facility] ICD9: 426.13;  Surgeon: [facility] Surgery;  [place] [facility] [place]  Service:    • PACEMAKER INSERTION  July 2015    [facility] [employer] [address] #[id number] implanted by Dr. Sanchez.   • APPENDECTOMY     • INGUINAL HERNIA REPAIR BILATERAL     • PILONIDAL CYST EXCISION       Family History   Problem Relation Age of Onset   • Heart Disease Mother    • Diabetes Mother    • Cancer Father 86        Pancreatitic     Social History     Social History   • Marital status:      Spouse name: N/A   • Number of children: N/A   • Years of education: N/A     Occupational History   • Not on file.     Social History Main Topics   • Smoking status: Never Smoker   • Smokeless tobacco: Former User      Comment: 8-10 years, but quit 35 years ago   • Alcohol use No      Comment: occ   • Drug use: No      Comment: marijuana   • Sexual activity: Yes     Partners: Female, Male     Other Topics Concern   • Not on file     Social History Narrative   • No narrative on file     No Known Allergies  Outpatient Encounter Prescriptions as of 1/8/2019   Medication Sig Dispense Refill   • digoxin (LANOXIN) 125 MCG Tab Take 1 Tab by mouth every day. 90 Tab 3   • simvastatin  "(ZOCOR) 40 MG Tab Take 1 Tab by mouth every evening. 90 Tab 3   • pantoprazole (PROTONIX) 40 MG Tablet Delayed Response Take 1 Tab by mouth every day. 90 Tab 3   • lisinopril (PRINIVIL) 10 MG Tab Take 1 Tab by mouth every day. 90 Tab 3   • Calcium Carb-Cholecalciferol (CALCIUM + D3) 600-200 MG-UNIT TABS Take  by mouth.     • docosahexanoic acid (OMEGA 3 FA) 1000 MG CAPS Take 1,000 mg by mouth 3 times a day, with meals.     • Multiple Vitamins-Minerals (CENTRUM SILVER ULTRA MENS PO) Take  by mouth.     • ascorbic acid (ASCORBIC ACID) 500 MG TABS Take 500 mg by mouth every day.     • aspirin (ASA) 81 MG CHEW Take 81 mg by mouth every day.       No facility-administered encounter medications on file as of 1/8/2019.      Review of Systems   Constitutional: Negative.  Negative for chills, fever and malaise/fatigue.   HENT: Negative.  Negative for sore throat.    Eyes: Negative.    Respiratory: Negative.  Negative for cough, hemoptysis, sputum production, shortness of breath, wheezing and stridor.    Cardiovascular: Negative.  Negative for chest pain, palpitations, orthopnea, claudication, leg swelling and PND.   Gastrointestinal: Negative.    Genitourinary: Negative.    Musculoskeletal: Negative.    Skin: Negative.    Neurological: Negative.  Negative for dizziness, loss of consciousness and weakness.   Endo/Heme/Allergies: Negative.  Does not bruise/bleed easily.   All other systems reviewed and are negative.       Objective:   /90 (BP Location: Left arm, Patient Position: Sitting, BP Cuff Size: Adult)   Pulse 82   Ht 1.765 m (5' 9.5\")   Wt 89.4 kg (197 lb)   SpO2 93%   BMI 28.67 kg/m²     Physical Exam   Constitutional: He appears well-developed and well-nourished. No distress.   HENT:   Head: Normocephalic and atraumatic.   Right Ear: External ear normal.   Left Ear: External ear normal.   Nose: Nose normal.   Mouth/Throat: No oropharyngeal exudate.   Eyes: Pupils are equal, round, and reactive to light. " Conjunctivae and EOM are normal. Right eye exhibits no discharge. Left eye exhibits no discharge. No scleral icterus.   Neck: Neck supple. No JVD present.   Cardiovascular: Normal rate, regular rhythm and intact distal pulses.  Exam reveals no gallop and no friction rub.    No murmur heard.  Pulmonary/Chest: Effort normal. No stridor. No respiratory distress. He has no wheezes. He has no rales. He exhibits no tenderness.   Abdominal: Soft. He exhibits no distension. There is no guarding.   Musculoskeletal: Normal range of motion. He exhibits no edema, tenderness or deformity.   Neurological: He is alert. He has normal reflexes. He displays normal reflexes. No cranial nerve deficit. He exhibits normal muscle tone. Coordination normal.   Skin: Skin is warm and dry. No rash noted. He is not diaphoretic. No erythema. No pallor.   Psychiatric: He has a normal mood and affect. His behavior is normal. Judgment and thought content normal.   Nursing note and vitals reviewed.      Assessment:     1. AVNRT (AV hitesh re-entry tachycardia) (HCC)     2. Cardiac pacemaker in situ     3. Pure hypercholesterolemia     4. Essential hypertension     5. Second degree AV block     6. Pre-diabetes         Medical Decision Making:  Today's Assessment / Status / Plan:     68-year-old male with AVNRT status post ablation with a pacemaker and high blood pressure.  All of his risk factors are well controlled.  There will be no changes to his medical therapy today.  We can see him back in 1 year.    Thank for you allowing me to take part in your patient's care, please call should you have any questions or would like to discuss this patient.

## 2019-03-06 DIAGNOSIS — E78.00 PURE HYPERCHOLESTEROLEMIA: ICD-10-CM

## 2019-03-06 DIAGNOSIS — Z12.5 PROSTATE CANCER SCREENING: ICD-10-CM

## 2019-03-06 DIAGNOSIS — R73.01 ELEVATED FASTING BLOOD SUGAR: ICD-10-CM

## 2019-03-06 DIAGNOSIS — I10 ESSENTIAL HYPERTENSION: ICD-10-CM

## 2019-03-08 ENCOUNTER — HOSPITAL ENCOUNTER (OUTPATIENT)
Dept: LAB | Facility: MEDICAL CENTER | Age: 69
End: 2019-03-08
Attending: FAMILY MEDICINE
Payer: MEDICARE

## 2019-03-08 DIAGNOSIS — I10 ESSENTIAL HYPERTENSION: ICD-10-CM

## 2019-03-08 DIAGNOSIS — Z12.5 PROSTATE CANCER SCREENING: ICD-10-CM

## 2019-03-08 DIAGNOSIS — E78.00 PURE HYPERCHOLESTEROLEMIA: ICD-10-CM

## 2019-03-08 DIAGNOSIS — R73.01 ELEVATED FASTING BLOOD SUGAR: ICD-10-CM

## 2019-03-08 LAB
ALBUMIN SERPL BCP-MCNC: 4.6 G/DL (ref 3.2–4.9)
ALBUMIN/GLOB SERPL: 1.5 G/DL
ALP SERPL-CCNC: 70 U/L (ref 30–99)
ALT SERPL-CCNC: 27 U/L (ref 2–50)
ANION GAP SERPL CALC-SCNC: 10 MMOL/L (ref 0–11.9)
AST SERPL-CCNC: 25 U/L (ref 12–45)
BASOPHILS # BLD AUTO: 0.8 % (ref 0–1.8)
BASOPHILS # BLD: 0.05 K/UL (ref 0–0.12)
BILIRUB SERPL-MCNC: 1.2 MG/DL (ref 0.1–1.5)
BUN SERPL-MCNC: 22 MG/DL (ref 8–22)
CALCIUM SERPL-MCNC: 9.2 MG/DL (ref 8.5–10.5)
CHLORIDE SERPL-SCNC: 104 MMOL/L (ref 96–112)
CHOLEST SERPL-MCNC: 139 MG/DL (ref 100–199)
CO2 SERPL-SCNC: 23 MMOL/L (ref 20–33)
CREAT SERPL-MCNC: 0.82 MG/DL (ref 0.5–1.4)
EOSINOPHIL # BLD AUTO: 0.11 K/UL (ref 0–0.51)
EOSINOPHIL NFR BLD: 1.7 % (ref 0–6.9)
ERYTHROCYTE [DISTWIDTH] IN BLOOD BY AUTOMATED COUNT: 46.4 FL (ref 35.9–50)
EST. AVERAGE GLUCOSE BLD GHB EST-MCNC: 111 MG/DL
FASTING STATUS PATIENT QL REPORTED: NORMAL
GLOBULIN SER CALC-MCNC: 3 G/DL (ref 1.9–3.5)
GLUCOSE SERPL-MCNC: 98 MG/DL (ref 65–99)
HBA1C MFR BLD: 5.5 % (ref 0–5.6)
HCT VFR BLD AUTO: 51 % (ref 42–52)
HDLC SERPL-MCNC: 52 MG/DL
HGB BLD-MCNC: 17.1 G/DL (ref 14–18)
IMM GRANULOCYTES # BLD AUTO: 0.02 K/UL (ref 0–0.11)
IMM GRANULOCYTES NFR BLD AUTO: 0.3 % (ref 0–0.9)
LDLC SERPL CALC-MCNC: 67 MG/DL
LYMPHOCYTES # BLD AUTO: 2.16 K/UL (ref 1–4.8)
LYMPHOCYTES NFR BLD: 34.3 % (ref 22–41)
MCH RBC QN AUTO: 32.2 PG (ref 27–33)
MCHC RBC AUTO-ENTMCNC: 33.5 G/DL (ref 33.7–35.3)
MCV RBC AUTO: 96 FL (ref 81.4–97.8)
MONOCYTES # BLD AUTO: 0.58 K/UL (ref 0–0.85)
MONOCYTES NFR BLD AUTO: 9.2 % (ref 0–13.4)
NEUTROPHILS # BLD AUTO: 3.38 K/UL (ref 1.82–7.42)
NEUTROPHILS NFR BLD: 53.7 % (ref 44–72)
NRBC # BLD AUTO: 0 K/UL
NRBC BLD-RTO: 0 /100 WBC
PLATELET # BLD AUTO: 144 K/UL (ref 164–446)
PMV BLD AUTO: 10.5 FL (ref 9–12.9)
POTASSIUM SERPL-SCNC: 4 MMOL/L (ref 3.6–5.5)
PROT SERPL-MCNC: 7.6 G/DL (ref 6–8.2)
PSA SERPL-MCNC: 1.91 NG/ML (ref 0–4)
RBC # BLD AUTO: 5.31 M/UL (ref 4.7–6.1)
SODIUM SERPL-SCNC: 137 MMOL/L (ref 135–145)
TRIGL SERPL-MCNC: 98 MG/DL (ref 0–149)
TSH SERPL DL<=0.005 MIU/L-ACNC: 1.3 UIU/ML (ref 0.38–5.33)
WBC # BLD AUTO: 6.3 K/UL (ref 4.8–10.8)

## 2019-03-08 PROCEDURE — 80061 LIPID PANEL: CPT

## 2019-03-08 PROCEDURE — 80053 COMPREHEN METABOLIC PANEL: CPT

## 2019-03-08 PROCEDURE — 85025 COMPLETE CBC W/AUTO DIFF WBC: CPT

## 2019-03-08 PROCEDURE — 84153 ASSAY OF PSA TOTAL: CPT

## 2019-03-08 PROCEDURE — 36415 COLL VENOUS BLD VENIPUNCTURE: CPT

## 2019-03-08 PROCEDURE — 83036 HEMOGLOBIN GLYCOSYLATED A1C: CPT

## 2019-03-08 PROCEDURE — 84443 ASSAY THYROID STIM HORMONE: CPT

## 2019-03-26 ENCOUNTER — OFFICE VISIT (OUTPATIENT)
Dept: MEDICAL GROUP | Facility: MEDICAL CENTER | Age: 69
End: 2019-03-26
Payer: MEDICARE

## 2019-03-26 VITALS
DIASTOLIC BLOOD PRESSURE: 72 MMHG | SYSTOLIC BLOOD PRESSURE: 120 MMHG | HEIGHT: 70 IN | WEIGHT: 192 LBS | TEMPERATURE: 98.4 F | OXYGEN SATURATION: 95 % | HEART RATE: 79 BPM | BODY MASS INDEX: 27.49 KG/M2

## 2019-03-26 DIAGNOSIS — E55.9 VITAMIN D INSUFFICIENCY: ICD-10-CM

## 2019-03-26 DIAGNOSIS — R73.03 PRE-DIABETES: ICD-10-CM

## 2019-03-26 DIAGNOSIS — Z00.00 MEDICARE ANNUAL WELLNESS VISIT, SUBSEQUENT: ICD-10-CM

## 2019-03-26 DIAGNOSIS — Z85.828 HX OF SKIN CANCER, BASAL CELL: ICD-10-CM

## 2019-03-26 DIAGNOSIS — I44.1 SECOND DEGREE AV BLOCK: ICD-10-CM

## 2019-03-26 DIAGNOSIS — Z95.0 CARDIAC PACEMAKER IN SITU: ICD-10-CM

## 2019-03-26 DIAGNOSIS — I47.19 AVNRT (AV NODAL RE-ENTRY TACHYCARDIA): ICD-10-CM

## 2019-03-26 DIAGNOSIS — E78.5 HYPERLIPIDEMIA, UNSPECIFIED HYPERLIPIDEMIA TYPE: ICD-10-CM

## 2019-03-26 DIAGNOSIS — I10 ESSENTIAL HYPERTENSION: ICD-10-CM

## 2019-03-26 DIAGNOSIS — K21.9 GASTROESOPHAGEAL REFLUX DISEASE, ESOPHAGITIS PRESENCE NOT SPECIFIED: ICD-10-CM

## 2019-03-26 DIAGNOSIS — R05.9 COUGH: ICD-10-CM

## 2019-03-26 PROCEDURE — G0439 PPPS, SUBSEQ VISIT: HCPCS | Performed by: FAMILY MEDICINE

## 2019-03-26 RX ORDER — DOXYCYCLINE HYCLATE 100 MG
100 TABLET ORAL 2 TIMES DAILY
Qty: 14 TAB | Refills: 0 | Status: SHIPPED | OUTPATIENT
Start: 2019-03-26 | End: 2019-04-02

## 2019-03-26 RX ORDER — PANTOPRAZOLE SODIUM 40 MG/1
40 TABLET, DELAYED RELEASE ORAL
Qty: 90 TAB | Refills: 3 | Status: SHIPPED | OUTPATIENT
Start: 2019-03-26 | End: 2019-07-15

## 2019-03-26 RX ORDER — SIMVASTATIN 40 MG
40 TABLET ORAL EVERY EVENING
Qty: 90 TAB | Refills: 3 | Status: SHIPPED | OUTPATIENT
Start: 2019-03-26 | End: 2020-03-27 | Stop reason: SDUPTHER

## 2019-03-26 RX ORDER — LISINOPRIL 10 MG/1
10 TABLET ORAL
Qty: 90 TAB | Refills: 3 | Status: SHIPPED | OUTPATIENT
Start: 2019-03-26 | End: 2019-07-15

## 2019-03-26 ASSESSMENT — PATIENT HEALTH QUESTIONNAIRE - PHQ9: CLINICAL INTERPRETATION OF PHQ2 SCORE: 0

## 2019-03-26 ASSESSMENT — ENCOUNTER SYMPTOMS: GENERAL WELL-BEING: GOOD

## 2019-03-26 ASSESSMENT — ACTIVITIES OF DAILY LIVING (ADL): BATHING_REQUIRES_ASSISTANCE: 0

## 2019-03-26 NOTE — PROGRESS NOTES
Chief Complaint   Patient presents with   • Annual Exam   • Cough     no mucus, no discomofory, infrequent         HPI:  Emil Reid is a 68 y.o. here for Medicare Annual Wellness Visit     Patient Active Problem List    Diagnosis Date Noted   • Cardiac pacemaker in situ 07/07/2015     Priority: High   • Second degree AV block 07/01/2015     Priority: High   • AVNRT (AV hitesh re-entry tachycardia) (HCC) 06/22/2015     Priority: High   • Hypertension 01/09/2014     Priority: High   • Hyperlipidemia 01/09/2014     Priority: High   • GERD (gastroesophageal reflux disease) 01/09/2014     Priority: Medium   • Vitamin D insufficiency 01/09/2014     Priority: Low   • Hx of skin cancer, basal cell 01/09/2014     Priority: Low   • Pre-diabetes 04/22/2015       Current Outpatient Prescriptions   Medication Sig Dispense Refill   • pantoprazole (PROTONIX) 40 MG Tablet Delayed Response Take 1 Tab by mouth every day. 90 Tab 3   • simvastatin (ZOCOR) 40 MG Tab Take 1 Tab by mouth every evening. 90 Tab 3   • lisinopril (PRINIVIL) 10 MG Tab Take 1 Tab by mouth every day. 90 Tab 3   • doxycycline (VIBRAMYCIN) 100 MG Tab Take 1 Tab by mouth 2 times a day for 7 days. 14 Tab 0   • digoxin (LANOXIN) 125 MCG Tab Take 1 Tab by mouth every day. 90 Tab 3   • Calcium Carb-Cholecalciferol (CALCIUM + D3) 600-200 MG-UNIT TABS Take  by mouth.     • docosahexanoic acid (OMEGA 3 FA) 1000 MG CAPS Take 1,000 mg by mouth 3 times a day, with meals.     • Multiple Vitamins-Minerals (CENTRUM SILVER ULTRA MENS PO) Take  by mouth.     • ascorbic acid (ASCORBIC ACID) 500 MG TABS Take 500 mg by mouth every day.     • aspirin (ASA) 81 MG CHEW Take 81 mg by mouth every day.       No current facility-administered medications for this visit.             Current supplements as per medication list.       Allergies: Patient has no known allergies.    Current social contact/activities: living with wife. Very active with golf. Son lives in [facility]. Daughter  in [place].     He  reports that he has never smoked. He has quit using smokeless tobacco. He reports that he does not drink alcohol or use drugs.  Counseling given: Not Answered      DPA/Advanced Directive:  Patient has Advanced Directive on file.     ROS:    Gait: Uses no assistive device  Ostomy: No  Other tubes: No  Amputations: No  Chronic oxygen use: No  Last eye exam: 3 months ago.  Wears hearing aids: No   : Denies any urinary leakage during the last 6 months      Depression Screening    Little interest or pleasure in doing things?  0 - not at all  Feeling down, depressed , or hopeless? 0 - not at all  Patient Health Questionnaire Score: 0     If depressive symptoms identified deferred to follow up visit unless specifically addressed in assessment and plan.    Interpretation of PHQ-9 Total Score   Score Severity   1-4 No Depression   5-9 Mild Depression   10-14 Moderate Depression   15-19 Moderately Severe Depression   20-27 Severe Depression    Screening for Cognitive Impairment    Three Minute Recall (leader, season, table) 3/3    Hal clock face with all 12 numbers and set the hands to show 10 past 11.  Yes    Cognitive concerns identified deferred for follow up unless specifically addressed in assessment and plan.    Fall Risk Assessment    Has the patient had two or more falls in the last year or any fall with injury in the last year?  No    Safety Assessment    Throw rugs on floor.  Yes  Handrails on all stairs.  Yes  Good lighting in all hallways.  Yes  Difficulty hearing.  No  Patient counseled about all safety risks that were identified.    Functional Assessment ADLs    Are there any barriers preventing you from cooking for yourself or meeting nutritional needs?  No.    Are there any barriers preventing you from driving safely or obtaining transportation?  No.    Are there any barriers preventing you from using a telephone or calling for help?  No.    Are there any barriers preventing you from  shopping?  No.    Are there any barriers preventing you from taking care of your own finances?  No.    Are there any barriers preventing you from managing your medications?  No.    Are there any barriers preventing you from showering, bathing or dressing yourself?  No.    Are you currently engaging in any exercise or physical activity?  Yes.     What is your perception of your health?  Good.      Health Maintenance Summary                IMM ZOSTER VACCINES Overdue 3/23/2015      Done 1/26/2015 Imm Admin: Zoster Vaccine Live (ZVL) (Zostavax)    Annual Wellness Visit Overdue 3/15/2019      Done 3/14/2018 Visit Dx: Medicare annual wellness visit, subsequent     Patient has more history with this topic...    COLONOSCOPY Next Due 5/4/2020      Done 5/4/2010      Patient has more history with this topic...    IMM DTaP/Tdap/Td Vaccine Next Due 1/17/2021      Done 1/17/2011 Imm Admin: Tdap Vaccine          Patient Care Team:  Edmond Flower M.D. as PCP - General (Family Medicine)        Social History   Substance Use Topics   • Smoking status: Never Smoker   • Smokeless tobacco: Former User      Comment: 8-10 years, but quit 35 years ago   • Alcohol use No      Comment: occ     Family History   Problem Relation Age of Onset   • Heart Disease Mother    • Diabetes Mother    • Cancer Father 86        Pancreatitic     He  has a past medical history of AV block (July 2015); AVNRT (AV hitesh re-entry tachycardia) ([facility]); GERD (gastroesophageal reflux disease) ( ); Heart burn; Hyperlipemia; Hypertension; and Indigestion.   Past Surgical History:   Procedure Laterality Date   • RECOVERY  7/1/2015    Procedure:  CATH LAB  PM INSERT [facility].J[facility] SANCHEZ ICD9: 426.13;  Surgeon: Recoveryonly Surgery;  Location[place] SURG[place] PRE[place] [facility] [place]  Service:    • PACEMAKER INSERTION  July 2015    [facility] #[id number] implanted by Dr. Sanchez.   • APPENDECTOMY     • INGUINAL HERNIA REPAIR BILATERAL     • PILONIDAL CYST EXCISION    "      Exam:   Blood pressure 120/72, pulse 79, temperature 36.9 °C (98.4 °F), height 1.765 m (5' 9.5\"), weight 87.1 kg (192 lb), SpO2 95 %. Body mass index is 27.95 kg/m².    Constitutional: Alert, no distress.  Skin: Warm, dry, good turgor, no rashes in visible areas.  Eye: Equal, round and reactive, conjunctiva clear, lids normal.  ENMT: Lips without lesions, good dentition, oropharynx clear.  TMs pearly gray bilaterally.  Neck: Trachea midline, no masses, no thyromegaly. No cervical or supraclavicular lymphadenopathy  Respiratory: Unlabored respiratory effort, lungs clear to auscultation, no wheezes, no ronchi.  Cardiovascular: Normal S1, S2, no murmur, no edema.  Psych: Alert and oriented x3, normal affect and mood.      Assessment and Plan. The following treatment and monitoring plan is recommended:    1. Medicare annual wellness visit, subsequent  - Subsequent Annual Wellness Visit - Includes PPPS ()    2. Gastroesophageal reflux disease, esophagitis presence not specified  Controlled.  Refill pantoprazole.  - pantoprazole (PROTONIX) 40 MG Tablet Delayed Response; Take 1 Tab by mouth every day.  Dispense: 90 Tab; Refill: 3  - Subsequent Annual Wellness Visit - Includes PPPS ()    3. Hyperlipidemia, unspecified hyperlipidemia type  Controlled.  Continue simvastatin.  - simvastatin (ZOCOR) 40 MG Tab; Take 1 Tab by mouth every evening.  Dispense: 90 Tab; Refill: 3  - Subsequent Annual Wellness Visit - Includes PPPS ()    4. Essential hypertension  Controlled.  Refill lisinopril.  - lisinopril (PRINIVIL) 10 MG Tab; Take 1 Tab by mouth every day.  Dispense: 90 Tab; Refill: 3  - Subsequent Annual Wellness Visit - Includes PPPS ()    5. Pre-diabetes  Improved.  Advised patient to monitor diet and exercise regularly.  Continue with weight loss.  - Subsequent Annual Wellness Visit - Includes PPPS ()    6. AVNRT (AV hitesh re-entry tachycardia) (HCC)  Stable.  Continue follow with cardiology.   - " Subsequent Annual Wellness Visit - Includes PPPS ()    7. Second degree AV block  Stable.  Continue follow with cardiology.   - Subsequent Annual Wellness Visit - Includes PPPS ()    8. Cardiac pacemaker in situ  Stable.  Continue follow with cardiology.   - Subsequent Annual Wellness Visit - Includes PPPS ()    9. Hx of skin cancer, basal cell  Stable.  Continue following with dermatology.  - Subsequent Annual Wellness Visit - Includes PPPS ()    10. Vitamin D insufficiency  Improved with supplementation.  Continue to monitor.  - Subsequent Annual Wellness Visit - Includes PPPS ()    11. Cough  Clear lungs on exam.  Check chest x-ray and call with results before he leaves for a prolonged trip to [place].  - DX-CHEST-2 VIEWS; Future        Services suggested: No services needed at this time  Health Care Screening: Age-appropriate preventive services recommended by USPTF and ACIP covered by Medicare were discussed today. Services ordered if indicated and agreed upon by the patient.  Referrals offered: Community-based lifestyle interventions to reduce health risks and promote self-management and wellness, fall prevention, nutrition, physical activity, tobacco-use cessation, weight loss, and mental health services as per orders if indicated.    Discussion today about general wellness and lifestyle habits:    · Prevent falls and reduce trip hazards; Cautioned about securing or removing rugs.  · Have a working fire alarm and carbon monoxide detector;   · Engage in regular physical activity and social activities     Follow-up: Return in about 1 year (around 3/26/2020) for Annual.

## 2019-03-27 ENCOUNTER — HOSPITAL ENCOUNTER (OUTPATIENT)
Dept: RADIOLOGY | Facility: MEDICAL CENTER | Age: 69
End: 2019-03-27
Attending: FAMILY MEDICINE
Payer: MEDICARE

## 2019-03-27 DIAGNOSIS — R05.9 COUGH: ICD-10-CM

## 2019-03-27 PROCEDURE — 71046 X-RAY EXAM CHEST 2 VIEWS: CPT

## 2019-03-29 ENCOUNTER — TELEPHONE (OUTPATIENT)
Dept: MEDICAL GROUP | Facility: MEDICAL CENTER | Age: 69
End: 2019-03-29

## 2019-03-29 NOTE — TELEPHONE ENCOUNTER
----- Message from Edmond Flower M.D. sent at 3/27/2019  2:42 PM PDT -----  Please notify patient that his heart and lungs look normal.  No concerns on chest x-ray.  Edmond Flower M.D.

## 2019-06-17 DIAGNOSIS — I47.19 AVNRT (AV NODAL RE-ENTRY TACHYCARDIA): ICD-10-CM

## 2019-06-17 RX ORDER — DIGOXIN 125 MCG
TABLET ORAL
Qty: 90 TAB | Refills: 3 | Status: SHIPPED | OUTPATIENT
Start: 2019-06-17 | End: 2019-07-15

## 2019-07-08 ENCOUNTER — NON-PROVIDER VISIT (OUTPATIENT)
Dept: CARDIOLOGY | Facility: MEDICAL CENTER | Age: 69
End: 2019-07-08
Payer: MEDICARE

## 2019-07-08 VITALS
DIASTOLIC BLOOD PRESSURE: 70 MMHG | HEART RATE: 76 BPM | WEIGHT: 188 LBS | HEIGHT: 70 IN | SYSTOLIC BLOOD PRESSURE: 118 MMHG | OXYGEN SATURATION: 96 % | BODY MASS INDEX: 26.92 KG/M2

## 2019-07-08 DIAGNOSIS — Z95.0 CARDIAC PACEMAKER IN SITU: ICD-10-CM

## 2019-07-08 DIAGNOSIS — I47.19 AVNRT (AV NODAL RE-ENTRY TACHYCARDIA): ICD-10-CM

## 2019-07-08 DIAGNOSIS — I44.1 SECOND DEGREE AV BLOCK: ICD-10-CM

## 2019-07-08 PROCEDURE — 93280 PM DEVICE PROGR EVAL DUAL: CPT | Performed by: NURSE PRACTITIONER

## 2019-07-08 NOTE — PROGRESS NOTES
Device is working normally. 16 brief mode switching episodes (<10 seconds, <1% of total time).  Normal sensing and capture of RA and RV leads; stable impedances. Battery longevity is 7.1 years.     Changes today include turning on ACapConfirm.    FU in 6 months for next PM check with me, along with annual follow-up with Dr. Miguel.    Collaborating MD: Michael

## 2019-07-15 ENCOUNTER — HOSPITAL ENCOUNTER (EMERGENCY)
Facility: MEDICAL CENTER | Age: 69
End: 2019-07-15
Attending: EMERGENCY MEDICINE
Payer: MEDICARE

## 2019-07-15 VITALS
TEMPERATURE: 98 F | OXYGEN SATURATION: 96 % | SYSTOLIC BLOOD PRESSURE: 164 MMHG | HEIGHT: 69 IN | RESPIRATION RATE: 18 BRPM | DIASTOLIC BLOOD PRESSURE: 99 MMHG | WEIGHT: 194 LBS | BODY MASS INDEX: 28.73 KG/M2 | HEART RATE: 50 BPM

## 2019-07-15 DIAGNOSIS — R04.0 EPISTAXIS: ICD-10-CM

## 2019-07-15 PROCEDURE — A9270 NON-COVERED ITEM OR SERVICE: HCPCS | Performed by: EMERGENCY MEDICINE

## 2019-07-15 PROCEDURE — 303620 HCHG EPISTAXIS CONTROL

## 2019-07-15 PROCEDURE — 700102 HCHG RX REV CODE 250 W/ 637 OVERRIDE(OP): Performed by: EMERGENCY MEDICINE

## 2019-07-15 PROCEDURE — 700101 HCHG RX REV CODE 250: Performed by: EMERGENCY MEDICINE

## 2019-07-15 PROCEDURE — 99283 EMERGENCY DEPT VISIT LOW MDM: CPT

## 2019-07-15 RX ORDER — PANTOPRAZOLE SODIUM 40 MG/1
40 TABLET, DELAYED RELEASE ORAL EVERY EVENING
Status: SHIPPED | COMMUNITY
End: 2019-08-05 | Stop reason: SDUPTHER

## 2019-07-15 RX ORDER — DIGOXIN 125 MCG
125 TABLET ORAL EVERY EVENING
COMMUNITY
End: 2020-03-27 | Stop reason: SDUPTHER

## 2019-07-15 RX ORDER — AMOXICILLIN 500 MG/1
500 CAPSULE ORAL 3 TIMES DAILY
Qty: 15 CAP | Refills: 0 | Status: SHIPPED | OUTPATIENT
Start: 2019-07-15 | End: 2019-07-20

## 2019-07-15 RX ORDER — LIDOCAINE HYDROCHLORIDE AND EPINEPHRINE 10; 10 MG/ML; UG/ML
20 INJECTION, SOLUTION INFILTRATION; PERINEURAL ONCE
Status: COMPLETED | OUTPATIENT
Start: 2019-07-15 | End: 2019-07-15

## 2019-07-15 RX ORDER — LISINOPRIL 10 MG/1
10 TABLET ORAL EVERY EVENING
COMMUNITY
End: 2020-03-27 | Stop reason: SDUPTHER

## 2019-07-15 RX ADMIN — LIDOCAINE HYDROCHLORIDE AND EPINEPHRINE 20 ML: 10; 10 INJECTION, SOLUTION INFILTRATION; PERINEURAL at 13:15

## 2019-07-15 RX ADMIN — Medication 1 SPRAY: at 13:15

## 2019-07-15 NOTE — DISCHARGE INSTRUCTIONS
Please leave the packing in place.  This needs to be removed in 5 days.  You can follow-up with ENT, you can return to the emergency department for us to see your good your doctor.  Return for more bleeding, if you have pain, headache fever or other concerns.  I could not see the source of your bleeding.  I do think this merits an ENT follow-up at some point.  Take antibiotics as prescribed.  Have your blood pressure rechecked it was high.

## 2019-07-15 NOTE — ED PROVIDER NOTES
ED Provider Note    CHIEF COMPLAINT  Chief Complaint   Patient presents with   • Epistaxis       [facility] 69 y.[facility] male who presents to the emergency department for evaluation of epistaxis.  The patient started having a bloody nose about 30 minutes prior to arrival.  Bleeding mostly on the right side.  Denies any trauma.  Denies any other bleeding or bruising.  Takes a baby aspirin denies any other blood thinners.  Denies any other acute concerns or complaints.    REVIEW OF SYSTEMS  See HPI for further details. All other systems are negative.    PAST MEDICAL HISTORY  Past Medical History:   Diagnosis Date   • AV block July 2015    Status post PPM implantation.   • AVNRT (AV hitesh re-entry tachycardia) ([facility])    • GERD (gastroesophageal reflux disease)     • Heart burn    • Hyperlipemia    • Hypertension    • Indigestion        FAMILY HISTORY  Family History   Problem Relation Age of Onset   • Heart Disease Mother    • Diabetes Mother    • Cancer Father 86        Pancreatitic       SOCIAL HISTORY  Social History     Social History   • Marital status:      Spouse name: N/A   • Number of children: N/A   • Years of education: N/A     Social History Main Topics   • Smoking status: Never Smoker   • Smokeless tobacco: Former User      Comment: 8-10 years, but quit 35 years ago   • Alcohol use No      Comment: occ   • Drug use: No      Comment: marijuana   • Sexual activity: Yes     Partners: Female, Male     Other Topics Concern   • Not on file     Social History Narrative   • No narrative on file       SURGICAL HISTORY  Past Surgical History:   Procedure Laterality Date   • RECOVERY  7/1/2015    Procedure:  CATH LAB  PM INSERT [facility] ICD9: 426.13;  Surgeon: Recoveryonly Surgery;  Location: SURG[place] PRE-POST PROC UNIT [facility] [place];  Service:    • PACEMAKER INSERTION  July 2015    [facility] #[id number] implanted by Dr. Sanchez.   • APPENDECTOMY     • INGUINAL HERNIA REPAIR BILATERAL     •  "PILONIDAL CYST EXCISION         CURRENT MEDICATIONS  Home Medications    **Home medications have not yet been reviewed for this encounter**         ALLERGIES  No Known Allergies    PHYSICAL EXAM  VITAL SIGNS: BP (!) 164/99   Pulse (!) 51   Temp 36.7 °C (98 °F) (Temporal)   Resp 18   Ht 1.753 m (5' 9\")   Wt 88 kg (194 lb 0.1 oz)   SpO2 96%   BMI 28.65 kg/m²    Constitutional: Well developed, Well nourished, No acute distress, Non-toxic appearance.   HENT: Normocephalic, Atraumatic, Bilateral external ears normal, Oropharynx moist, No oral exudates, blood in both nares but blood dripping from the right naris.  Blood is clean.  I do not see any source of bleeding in the anterior portion.  Eyes: PERRL, EOMI, Conjunctiva normal, No discharge.   Neck: Normal range of motion, No tenderness, Supple, No stridor.    Cardiovascular: Normal heart rate, Normal rhythm, No murmurs, No rubs, No gallops.   Thorax & Lungs: Normal breath sounds, No respiratory distress, No wheezing, No chest tenderness.   Abdomen: Bowel sounds normal, Soft, No tenderness,  Musculoskeletal: Good range of motion in all major joints.  Neurologic: Alert, No focal deficits noted.   Psychiatric: Affect anxious    RADIOLOGY/PROCEDURES  Management of epistaxis.    Initially the clots were back rated by having the patient blow his nose and once.  Mark-Synephrine was sprayed in each nostril.  The patient was then reexamined he is tripping and bleeding from the right naris but none from the left.  Do not see the source is no immediately identifiable area of hemorrhage to suggest anterior epistaxis on the right side.  His nose is then packed with a cotton ball soaked lidocaine with epinephrine for several minutes and direct pressure is applied.    Bleeding slowed down but is not stopped.    After that we discussed pros and cons of packing.  Patient is agreeable to packing.  A rapid Rhino balloon is placed in the right naris.  This is moistened with saline " and Levaquin was placed in his nose.  This is tolerated well.  Gently inflated.  A bleeding seems to have stopped there is no bleeding in the posterior oropharynx.  The patient is observed here for about an hour with no residual bleeding.    At this point the patient can be discharged home.  He will be put on 5 days of amoxicillin.  He is given close return precautions for recurrent bleeding, if he has pain, fever or other concerns.  If he has more bleeding he will go to the main hospital since we do not have ENT on call here.    He will need to follow-up in 5 days he can return here or his doctor or is referred to ENT.  In the interim he will return if he is worse.    Patient is known to have elevated blood pressures counseled to have this rechecked within a week.  This is likely secondary to stress and anxiety.    Patient is reassessed she is doing well.  Is agreeable to plan questions are answered he is discharged in stable condition p      The patient was noted to have elevated blood pressure while in the ER and was counseled to see their doctor within one wee to have this rechecked.    FINAL IMPRESSION  1. Epistaxis    2.  Management of epistaxis    2.   3.         Electronically signed by: Deacon Hassan, 7/15/2019 10:51 AM

## 2019-07-15 NOTE — ED NOTES
NO Bleeding noted.  All discharge instructions and prescriptions discussed, all questions answered, discharged with steady gait.

## 2019-08-05 ENCOUNTER — PATIENT MESSAGE (OUTPATIENT)
Dept: MEDICAL GROUP | Facility: MEDICAL CENTER | Age: 69
End: 2019-08-05

## 2019-08-05 RX ORDER — PANTOPRAZOLE SODIUM 40 MG/1
40 TABLET, DELAYED RELEASE ORAL EVERY EVENING
Qty: 90 TAB | Refills: 3 | Status: SHIPPED | OUTPATIENT
Start: 2019-08-05 | End: 2020-03-27 | Stop reason: SDUPTHER

## 2019-08-05 NOTE — TELEPHONE ENCOUNTER
----- Message from Emil Reid sent at 8/5/2019  7:43 AM PDT -----  Regarding: Prescription Question  Contact: [phone]  I need a new prescription for pantaprazole. Would you please send it to [facility]?    Thank you,  Adrian Reid  1950

## 2019-11-21 ENCOUNTER — OFFICE VISIT (OUTPATIENT)
Dept: DERMATOLOGY | Facility: IMAGING CENTER | Age: 69
End: 2019-11-21
Payer: MEDICARE

## 2019-11-21 DIAGNOSIS — L82.1 SEBORRHEIC KERATOSES: ICD-10-CM

## 2019-11-21 DIAGNOSIS — D22.9 MULTIPLE NEVI: ICD-10-CM

## 2019-11-21 DIAGNOSIS — D18.01 CHERRY ANGIOMA: ICD-10-CM

## 2019-11-21 DIAGNOSIS — Z85.828 HISTORY OF BASAL CELL CARCINOMA: ICD-10-CM

## 2019-11-21 PROCEDURE — 99212 OFFICE O/P EST SF 10 MIN: CPT | Performed by: DERMATOLOGY

## 2019-11-21 ASSESSMENT — ENCOUNTER SYMPTOMS
FEVER: 0
CHILLS: 0

## 2019-11-21 NOTE — PROGRESS NOTES
Dermatology Return Patient Visit    Chief Complaint   Patient presents with   • Follow-Up     LINDA       Subjective:     HPI:   Emil Reid is a 68 y.o. male presenting for    Follow up for annual skin exam  One new area of concer    HPI/location: Left upper arm, bump  Time present: ?1-2 weeks  Painful lesion: No  Itching lesion: No  Enlarging lesion: No  Anything make it better or worse? No    Last exam a year ago   History of skin cancer: Yes, Details:  BCC left cheek 20 years ago   History of biopsies:Yes, Details:    History of blistering/severe sunburns:No  Family history of skin cancer:No  Family history of atypical moles:No      Past Medical History:   Diagnosis Date   • AV block July 2015    Status post PPM implantation.   • AVNRT (AV hitesh re-entry tachycardia) ([facility])    • GERD (gastroesophageal reflux disease)     • Heart burn    • Hyperlipemia    • Hypertension    • Indigestion        Current Outpatient Medications on File Prior to Visit   Medication Sig Dispense Refill   • pantoprazole (PROTONIX) 40 MG Tablet Delayed Response Take 1 Tab by mouth every evening. 90 Tab 3   • aspirin EC (ECOTRIN) 81 MG Tablet Delayed Response Take 81 mg by mouth every evening.     • digoxin (LANOXIN) 125 MCG Tab Take 125 mcg by mouth every evening.     • lisinopril (PRINIVIL) 10 MG Tab Take 10 mg by mouth every evening.     • simvastatin (ZOCOR) 40 MG Tab Take 1 Tab by mouth every evening. 90 Tab 3   • Calcium Carb-Cholecalciferol (CALCIUM + D3) 600-200 MG-UNIT TABS Take 1 Tab by mouth every evening.     • Multiple Vitamins-Minerals (CENTRUM SILVER ULTRA MENS PO) Take 1 Tab by mouth every evening.     • ascorbic acid (ASCORBIC ACID) 500 MG TABS Take 500 mg by mouth every evening.       No current facility-administered medications on file prior to visit.        No Known Allergies    Family History   Problem Relation Age of Onset   • Heart Disease Mother    • Diabetes Mother    • Cancer Father 86        Pancreatitic            Social History     Socioeconomic History   • Marital status:      Spouse name: Not on file   • Number of children: Not on file   • Years of education: Not on file   • Highest education level: Not on file   Occupational History   • Not on file   Social Needs   • Financial resource strain: Not on file   • Food insecurity:     Worry: Not on file     Inability: Not on file   • Transportation needs:     Medical: Not on file     Non-medical: Not on file   Tobacco Use   • Smoking status: Never Smoker   • Smokeless tobacco: Former User   • Tobacco comment: 8-10 years, but quit 35 years ago   Substance and Sexual Activity   • Alcohol use: No     Alcohol/week: 1.0 oz     Types: 1 Glasses of wine, 1 Cans of beer per week     Comment: occ   • Drug use: No     Types: Inhaled     Comment: marijuana   • Sexual activity: Yes     Partners: Female, Male   Lifestyle   • Physical activity:     Days per week: Not on file     Minutes per session: Not on file   • Stress: Not on file   Relationships   • Social connections:     Talks on phone: Not on file     Gets together: Not on file     Attends [employer] service: Not on file     Active member of club or organization: Not on file     Attends meetings of clubs or organizations: Not on file     Relationship status: Not on file   • Intimate partner violence:     Fear of current or ex partner: Not on file     Emotionally abused: Not on file     Physically abused: Not on file     Forced sexual activity: Not on file   Other Topics Concern   • Not on file   Social History Narrative   • Not on file       Review of Systems   Constitutional: Negative for chills and fever.   Skin: Negative for itching and rash.        Objective:     A full mucocutaneous exam was completed including: scalp, hair, ears, face, eyelids, conjunctiva, lips, gums/tongue/oropharynx, neck, chest, abdomen, back, left and right upper extremities (including hands/digits and fingernails), left and right lower  extremities (including feet/toes, toenails), buttocks, excluding external genitalia (patient refusal) with the following pertinent findings listed below. Remaining above-listed examined areas within normal limits / negative for rashes or lesions.    There were no vitals taken for this visit.    Physical Exam   Constitutional: He is oriented to person, place, and time and well-developed, well-nourished, and in no distress.   HENT:   Head: Normocephalic and atraumatic.       Right Ear: External ear normal.   Left Ear: External ear normal.   Nose: Nose normal.   Mouth/Throat: Oropharynx is clear and moist.   Eyes: Conjunctivae and lids are normal.   Neck: Normal range of motion. Neck supple.   Cardiovascular: Intact distal pulses.   Pulmonary/Chest: Effort normal.   Neurological: He is alert and oriented to person, place, and time.   Skin: Skin is warm and dry.        Psychiatric: Mood and affect normal.   Vitals reviewed.      DATA: none applicable to review    Assessment and Plan:     1. Multiple nevi  - Benign-appearing nature of lesions discussed. Advised to return to clinic for any new or concerning changes.    2. Cherry angioma  - Benign-appearing nature of lesions discussed. Advised to return to clinic for any new or concerning changes.    3. Seborrheic keratoses  - Benign-appearing nature of lesions discussed. Advised to return to clinic for any new or concerning changes.    4. History of basal cell carcinoma  Skin cancer education  - discussed importance of sun protective clothing, eyewear  - discussed importance of daily use of broad spectrum sunscreen with SPF 30 or greater, as well as need for reapplication ~every 2 hours when exposed to UVR  - discussed importance of regular self-exams, ideally once per month, every 12 months exams in clinic  - ABCDE's of melanoma discussed  - patient to bring any new or concerning lesions to my attention    Followup: Return in about 1 year (around 11/21/2020), or any  new/changing lesions.    Valerie Zamora M.D.

## 2019-12-10 ENCOUNTER — NON-PROVIDER VISIT (OUTPATIENT)
Dept: CARDIOLOGY | Facility: MEDICAL CENTER | Age: 69
End: 2019-12-10
Payer: MEDICARE

## 2019-12-10 ENCOUNTER — OFFICE VISIT (OUTPATIENT)
Dept: CARDIOLOGY | Facility: MEDICAL CENTER | Age: 69
End: 2019-12-10
Payer: MEDICARE

## 2019-12-10 VITALS
SYSTOLIC BLOOD PRESSURE: 128 MMHG | BODY MASS INDEX: 26.92 KG/M2 | HEART RATE: 78 BPM | HEIGHT: 70 IN | DIASTOLIC BLOOD PRESSURE: 82 MMHG | OXYGEN SATURATION: 95 % | WEIGHT: 188 LBS

## 2019-12-10 VITALS
DIASTOLIC BLOOD PRESSURE: 82 MMHG | HEART RATE: 78 BPM | WEIGHT: 188 LBS | BODY MASS INDEX: 26.92 KG/M2 | HEIGHT: 70 IN | SYSTOLIC BLOOD PRESSURE: 128 MMHG | OXYGEN SATURATION: 95 %

## 2019-12-10 DIAGNOSIS — R73.03 PRE-DIABETES: ICD-10-CM

## 2019-12-10 DIAGNOSIS — I10 ESSENTIAL HYPERTENSION: ICD-10-CM

## 2019-12-10 DIAGNOSIS — I47.19 AVNRT (AV NODAL RE-ENTRY TACHYCARDIA): ICD-10-CM

## 2019-12-10 DIAGNOSIS — I44.1 SECOND DEGREE AV BLOCK: ICD-10-CM

## 2019-12-10 DIAGNOSIS — Z95.0 CARDIAC PACEMAKER IN SITU: ICD-10-CM

## 2019-12-10 DIAGNOSIS — E78.00 PURE HYPERCHOLESTEROLEMIA: ICD-10-CM

## 2019-12-10 PROCEDURE — 99213 OFFICE O/P EST LOW 20 MIN: CPT | Performed by: INTERNAL MEDICINE

## 2019-12-10 PROCEDURE — 93280 PM DEVICE PROGR EVAL DUAL: CPT | Performed by: NURSE PRACTITIONER

## 2019-12-10 ASSESSMENT — ENCOUNTER SYMPTOMS
SHORTNESS OF BREATH: 0
SPUTUM PRODUCTION: 0
FEVER: 0
ORTHOPNEA: 0
DIZZINESS: 0
LOSS OF CONSCIOUSNESS: 0
PALPITATIONS: 0
STRIDOR: 0
WHEEZING: 0
PND: 0
GASTROINTESTINAL NEGATIVE: 1
MUSCULOSKELETAL NEGATIVE: 1
CARDIOVASCULAR NEGATIVE: 1
WEAKNESS: 0
EYES NEGATIVE: 1
NEUROLOGICAL NEGATIVE: 1
CONSTITUTIONAL NEGATIVE: 1
CLAUDICATION: 0
CHILLS: 0
HEMOPTYSIS: 0
COUGH: 0
BRUISES/BLEEDS EASILY: 0
SORE THROAT: 0
RESPIRATORY NEGATIVE: 1

## 2019-12-10 NOTE — PROGRESS NOTES
Device is working normally. 18 mode switching episodes (all <10 seconds, <1% of total time).  Normal sensing and capture of RA and RV leads; stable impedances. Battery longevity is 7.1-7.6 years.  No changes are made today.    He does see Dr. Miguel today too.    FU in 6 months for next PM check with me.    Collaborating MD: Myriam

## 2019-12-10 NOTE — PROGRESS NOTES
Chief Complaint   Patient presents with   • HTN (Controlled)       Subjective:   Emil Reid is a 69 y.o. male who presents today as a follow-up for his heart block status post pacemaker, hyperlipidemia hypertension AVNRT status post ablation.  Since he was last seen his pacemaker was checked and is functioning well.  He did have some mode switching events which were all under 30 seconds in duration.  He is been asymptomatic.  He is functionally active and plays golf every day [employer].  His blood pressure is at goal.  His last LDL was at goal.    Past Medical History:   Diagnosis Date   • AV block July 2015    Status post PPM implantation.   • AVNRT (AV hitesh re-entry tachycardia) (HCC)    • GERD (gastroesophageal reflux disease)     • Heart burn    • Hyperlipemia    • Hypertension    • Indigestion      Past Surgical History:   Procedure Laterality Date   • RECOVERY  7/1/2015    Procedure:  CATH LAB  PM INSERT [facility] ICD9: 426.13;  Surgeon: Recoveryonly Surgery;  Location: SURGERY PRE-POST PROC UNIT [facility] [place];  Service:    • PACEMAKER INSERTION  July 2015    [facility] DR #[id number] implanted by Dr. Sanchez.   • APPENDECTOMY     • INGUINAL HERNIA REPAIR BILATERAL     • PILONIDAL CYST EXCISION       Family History   Problem Relation Age of Onset   • Heart Disease Mother    • Diabetes Mother    • Cancer Father 86        Pancreatitic     Social History     Socioeconomic History   • Marital status:      Spouse name: Not on file   • Number of children: Not on file   • Years of education: Not on file   • Highest education level: Not on file   Occupational History   • Not on file   Social Needs   • Financial resource strain: Not on file   • Food insecurity:     Worry: Not on file     Inability: Not on file   • Transportation needs:     Medical: Not on file     Non-medical: Not on file   Tobacco Use   • Smoking status: Never Smoker   • Smokeless tobacco: Former User   • Tobacco comment: 8-10  years, but quit 35 years ago   Substance and Sexual Activity   • Alcohol use: No     Alcohol/week: 1.0 oz     Types: 1 Glasses of wine, 1 Cans of beer per week     Comment: occ   • Drug use: No     Types: Inhaled     Comment: marijuana   • Sexual activity: Yes     Partners: Female, Male   Lifestyle   • Physical activity:     Days per week: Not on file     Minutes per session: Not on file   • Stress: Not on file   Relationships   • Social connections:     Talks on phone: Not on file     Gets together: Not on file     Attends Worship service: Not on file     Active member of club or organization: Not on file     Attends meetings of clubs or organizations: Not on file     Relationship status: Not on file   • Intimate partner violence:     Fear of current or ex partner: Not on file     Emotionally abused: Not on file     Physically abused: Not on file     Forced sexual activity: Not on file   Other Topics Concern   • Not on file   Social History Narrative   • Not on file     No Known Allergies  Outpatient Encounter Medications as of 12/10/2019   Medication Sig Dispense Refill   • pantoprazole (PROTONIX) 40 MG Tablet Delayed Response Take 1 Tab by mouth every evening. 90 Tab 3   • aspirin EC (ECOTRIN) 81 MG Tablet Delayed Response Take 81 mg by mouth every evening.     • digoxin (LANOXIN) 125 MCG Tab Take 125 mcg by mouth every evening.     • lisinopril (PRINIVIL) 10 MG Tab Take 10 mg by mouth every evening.     • simvastatin (ZOCOR) 40 MG Tab Take 1 Tab by mouth every evening. 90 Tab 3   • Calcium Carb-Cholecalciferol (CALCIUM + D3) 600-200 MG-UNIT TABS Take 1 Tab by mouth every evening.     • Multiple Vitamins-Minerals (CENTRUM SILVER ULTRA MENS PO) Take 1 Tab by mouth every evening.     • ascorbic acid (ASCORBIC ACID) 500 MG TABS Take 500 mg by mouth every evening.       No facility-administered encounter medications on file as of 12/10/2019.      Review of Systems   Constitutional: Negative.  Negative for chills,  "fever and malaise/fatigue.   HENT: Negative.  Negative for sore throat.    Eyes: Negative.    Respiratory: Negative.  Negative for cough, hemoptysis, sputum production, shortness of breath, wheezing and stridor.    Cardiovascular: Negative.  Negative for chest pain, palpitations, orthopnea, claudication, leg swelling and PND.   Gastrointestinal: Negative.    Genitourinary: Negative.    Musculoskeletal: Negative.    Skin: Negative.    Neurological: Negative.  Negative for dizziness, loss of consciousness and weakness.   Endo/Heme/Allergies: Negative.  Does not bruise/bleed easily.   All other systems reviewed and are negative.       Objective:   /82 (BP Location: Left arm, Patient Position: Sitting, BP Cuff Size: Adult)   Pulse 78   Ht 1.765 m (5' 9.5\")   Wt 85.3 kg (188 lb)   SpO2 95%   BMI 27.36 kg/m²     Physical Exam   Constitutional: He appears well-developed and well-nourished. No distress.   HENT:   Head: Normocephalic and atraumatic.   Right Ear: External ear normal.   Left Ear: External ear normal.   Nose: Nose normal.   Mouth/Throat: No oropharyngeal exudate.   Eyes: Pupils are equal, round, and reactive to light. Conjunctivae and EOM are normal. Right eye exhibits no discharge. Left eye exhibits no discharge. No scleral icterus.   Neck: Neck supple. No JVD present.   Cardiovascular: Normal rate, regular rhythm and intact distal pulses. Exam reveals no gallop and no friction rub.   No murmur heard.  Pulmonary/Chest: Effort normal. No stridor. No respiratory distress. He has no wheezes. He has no rales. He exhibits no tenderness.   Abdominal: Soft. He exhibits no distension. There is no guarding.   Musculoskeletal: Normal range of motion.         General: No tenderness, deformity or edema.   Neurological: He is alert. He has normal reflexes. He displays normal reflexes. No cranial nerve deficit. He exhibits normal muscle tone. Coordination normal.   Skin: Skin is warm and dry. No rash noted. He is " not diaphoretic. No erythema. No pallor.   Psychiatric: He has a normal mood and affect. His behavior is normal. Judgment and thought content normal.   Nursing note and vitals reviewed.      Assessment:     1. Pure hypercholesterolemia     2. Cardiac pacemaker in situ     3. AVNRT (AV hitesh re-entry tachycardia) (HCC)     4. Essential hypertension     5. Second degree AV block     6. Pre-diabetes         Medical Decision Making:  Today's Assessment / Status / Plan:     69-year-old male with a cardiac pacemaker, AVNRT hypertension and hyperlipidemia.  At this point all his risk factors are well controlled.  I will make any changes today.  We can see him back in 1 year.

## 2020-03-18 DIAGNOSIS — R73.01 ELEVATED FASTING BLOOD SUGAR: ICD-10-CM

## 2020-03-18 DIAGNOSIS — I10 ESSENTIAL HYPERTENSION: ICD-10-CM

## 2020-03-18 DIAGNOSIS — E78.00 PURE HYPERCHOLESTEROLEMIA: ICD-10-CM

## 2020-03-20 ENCOUNTER — HOSPITAL ENCOUNTER (OUTPATIENT)
Dept: LAB | Facility: MEDICAL CENTER | Age: 70
End: 2020-03-20
Attending: FAMILY MEDICINE
Payer: MEDICARE

## 2020-03-20 DIAGNOSIS — R73.01 ELEVATED FASTING BLOOD SUGAR: ICD-10-CM

## 2020-03-20 DIAGNOSIS — I10 ESSENTIAL HYPERTENSION: ICD-10-CM

## 2020-03-20 DIAGNOSIS — E78.00 PURE HYPERCHOLESTEROLEMIA: ICD-10-CM

## 2020-03-20 LAB
ALBUMIN SERPL BCP-MCNC: 4.6 G/DL (ref 3.2–4.9)
ALBUMIN/GLOB SERPL: 1.5 G/DL
ALP SERPL-CCNC: 79 U/L (ref 30–99)
ALT SERPL-CCNC: 30 U/L (ref 2–50)
ANION GAP SERPL CALC-SCNC: 13 MMOL/L (ref 7–16)
AST SERPL-CCNC: 28 U/L (ref 12–45)
BASOPHILS # BLD AUTO: 0.8 % (ref 0–1.8)
BASOPHILS # BLD: 0.07 K/UL (ref 0–0.12)
BILIRUB SERPL-MCNC: 0.8 MG/DL (ref 0.1–1.5)
BUN SERPL-MCNC: 24 MG/DL (ref 8–22)
CALCIUM SERPL-MCNC: 9.6 MG/DL (ref 8.5–10.5)
CHLORIDE SERPL-SCNC: 105 MMOL/L (ref 96–112)
CHOLEST SERPL-MCNC: 161 MG/DL (ref 100–199)
CO2 SERPL-SCNC: 24 MMOL/L (ref 20–33)
CREAT SERPL-MCNC: 0.83 MG/DL (ref 0.5–1.4)
EOSINOPHIL # BLD AUTO: 0.14 K/UL (ref 0–0.51)
EOSINOPHIL NFR BLD: 1.6 % (ref 0–6.9)
ERYTHROCYTE [DISTWIDTH] IN BLOOD BY AUTOMATED COUNT: 45.1 FL (ref 35.9–50)
EST. AVERAGE GLUCOSE BLD GHB EST-MCNC: 111 MG/DL
FASTING STATUS PATIENT QL REPORTED: NORMAL
GLOBULIN SER CALC-MCNC: 3 G/DL (ref 1.9–3.5)
GLUCOSE SERPL-MCNC: 89 MG/DL (ref 65–99)
HBA1C MFR BLD: 5.5 % (ref 0–5.6)
HCT VFR BLD AUTO: 51.1 % (ref 42–52)
HDLC SERPL-MCNC: 60 MG/DL
HGB BLD-MCNC: 16.8 G/DL (ref 14–18)
IMM GRANULOCYTES # BLD AUTO: 0.02 K/UL (ref 0–0.11)
IMM GRANULOCYTES NFR BLD AUTO: 0.2 % (ref 0–0.9)
LDLC SERPL CALC-MCNC: 81 MG/DL
LYMPHOCYTES # BLD AUTO: 2 K/UL (ref 1–4.8)
LYMPHOCYTES NFR BLD: 23.5 % (ref 22–41)
MCH RBC QN AUTO: 31.9 PG (ref 27–33)
MCHC RBC AUTO-ENTMCNC: 32.9 G/DL (ref 33.7–35.3)
MCV RBC AUTO: 97.1 FL (ref 81.4–97.8)
MONOCYTES # BLD AUTO: 0.67 K/UL (ref 0–0.85)
MONOCYTES NFR BLD AUTO: 7.9 % (ref 0–13.4)
NEUTROPHILS # BLD AUTO: 5.6 K/UL (ref 1.82–7.42)
NEUTROPHILS NFR BLD: 66 % (ref 44–72)
NRBC # BLD AUTO: 0 K/UL
NRBC BLD-RTO: 0 /100 WBC
PLATELET # BLD AUTO: 181 K/UL (ref 164–446)
PMV BLD AUTO: 10.6 FL (ref 9–12.9)
POTASSIUM SERPL-SCNC: 4.1 MMOL/L (ref 3.6–5.5)
PROT SERPL-MCNC: 7.6 G/DL (ref 6–8.2)
RBC # BLD AUTO: 5.26 M/UL (ref 4.7–6.1)
SODIUM SERPL-SCNC: 142 MMOL/L (ref 135–145)
TRIGL SERPL-MCNC: 102 MG/DL (ref 0–149)
TSH SERPL DL<=0.005 MIU/L-ACNC: 1.26 UIU/ML (ref 0.38–5.33)
WBC # BLD AUTO: 8.5 K/UL (ref 4.8–10.8)

## 2020-03-20 PROCEDURE — 80053 COMPREHEN METABOLIC PANEL: CPT

## 2020-03-20 PROCEDURE — 80061 LIPID PANEL: CPT

## 2020-03-20 PROCEDURE — 85025 COMPLETE CBC W/AUTO DIFF WBC: CPT

## 2020-03-20 PROCEDURE — 84443 ASSAY THYROID STIM HORMONE: CPT

## 2020-03-20 PROCEDURE — 36415 COLL VENOUS BLD VENIPUNCTURE: CPT

## 2020-03-20 PROCEDURE — 83036 HEMOGLOBIN GLYCOSYLATED A1C: CPT | Mod: GA

## 2020-03-27 ENCOUNTER — OFFICE VISIT (OUTPATIENT)
Dept: MEDICAL GROUP | Facility: MEDICAL CENTER | Age: 70
End: 2020-03-27
Payer: MEDICARE

## 2020-03-27 VITALS
OXYGEN SATURATION: 95 % | HEART RATE: 65 BPM | DIASTOLIC BLOOD PRESSURE: 72 MMHG | HEIGHT: 70 IN | WEIGHT: 186 LBS | TEMPERATURE: 98.4 F | SYSTOLIC BLOOD PRESSURE: 130 MMHG | BODY MASS INDEX: 26.63 KG/M2

## 2020-03-27 DIAGNOSIS — J34.89 DRY NOSE: ICD-10-CM

## 2020-03-27 DIAGNOSIS — I10 ESSENTIAL HYPERTENSION: ICD-10-CM

## 2020-03-27 DIAGNOSIS — R73.03 PRE-DIABETES: ICD-10-CM

## 2020-03-27 DIAGNOSIS — E78.00 PURE HYPERCHOLESTEROLEMIA: ICD-10-CM

## 2020-03-27 DIAGNOSIS — I47.19 AVNRT (AV NODAL RE-ENTRY TACHYCARDIA) (HCC): ICD-10-CM

## 2020-03-27 DIAGNOSIS — K21.9 GASTROESOPHAGEAL REFLUX DISEASE, ESOPHAGITIS PRESENCE NOT SPECIFIED: ICD-10-CM

## 2020-03-27 DIAGNOSIS — E78.5 HYPERLIPIDEMIA, UNSPECIFIED HYPERLIPIDEMIA TYPE: ICD-10-CM

## 2020-03-27 DIAGNOSIS — Z00.00 MEDICARE ANNUAL WELLNESS VISIT, SUBSEQUENT: ICD-10-CM

## 2020-03-27 PROCEDURE — G0439 PPPS, SUBSEQ VISIT: HCPCS | Performed by: FAMILY MEDICINE

## 2020-03-27 RX ORDER — SIMVASTATIN 40 MG
40 TABLET ORAL EVERY EVENING
Qty: 90 TAB | Refills: 3 | Status: SHIPPED | OUTPATIENT
Start: 2020-03-27 | End: 2021-11-22 | Stop reason: SDUPTHER

## 2020-03-27 RX ORDER — PANTOPRAZOLE SODIUM 40 MG/1
40 TABLET, DELAYED RELEASE ORAL EVERY EVENING
Qty: 90 TAB | Refills: 3 | Status: SHIPPED | OUTPATIENT
Start: 2020-03-27 | End: 2021-01-06

## 2020-03-27 RX ORDER — LISINOPRIL 10 MG/1
10 TABLET ORAL EVERY EVENING
Qty: 90 TAB | Refills: 3 | Status: SHIPPED | OUTPATIENT
Start: 2020-03-27 | End: 2021-11-22 | Stop reason: SDUPTHER

## 2020-03-27 RX ORDER — DIGOXIN 125 MCG
125 TABLET ORAL EVERY EVENING
Qty: 90 TAB | Refills: 3 | Status: SHIPPED | OUTPATIENT
Start: 2020-03-27 | End: 2021-11-22 | Stop reason: SDUPTHER

## 2020-03-27 ASSESSMENT — ACTIVITIES OF DAILY LIVING (ADL): BATHING_REQUIRES_ASSISTANCE: 0

## 2020-03-27 ASSESSMENT — PATIENT HEALTH QUESTIONNAIRE - PHQ9: CLINICAL INTERPRETATION OF PHQ2 SCORE: 0

## 2020-03-27 ASSESSMENT — FIBROSIS 4 INDEX: FIB4 SCORE: 2.01

## 2020-03-27 ASSESSMENT — ENCOUNTER SYMPTOMS: GENERAL WELL-BEING: GOOD

## 2020-03-27 NOTE — PROGRESS NOTES
Chief Complaint   Patient presents with   • Annual Exam   • Sinus Problem     dryness in nose   • Medication Refill         HPI:  Emil Reid is a 69 y.o. here for Medicare Annual Wellness Visit     Patient Active Problem List    Diagnosis Date Noted   • Cardiac pacemaker in situ 07/07/2015     Priority: High   • Second degree AV block 07/01/2015     Priority: High   • AVNRT (AV hitesh re-entry tachycardia) (HCC) 06/22/2015     Priority: High   • Hypertension 01/09/2014     Priority: High   • Hyperlipidemia 01/09/2014     Priority: High   • GERD (gastroesophageal reflux disease) 01/09/2014     Priority: Medium   • Vitamin D insufficiency 01/09/2014     Priority: Low   • Hx of skin cancer, basal cell 01/09/2014     Priority: Low   • Pre-diabetes 04/22/2015       Current Outpatient Medications   Medication Sig Dispense Refill   • pantoprazole (PROTONIX) 40 MG Tablet Delayed Response Take 1 Tab by mouth every evening. 90 Tab 3   • aspirin EC (ECOTRIN) 81 MG Tablet Delayed Response Take 81 mg by mouth every evening.     • digoxin (LANOXIN) 125 MCG Tab Take 125 mcg by mouth every evening.     • lisinopril (PRINIVIL) 10 MG Tab Take 10 mg by mouth every evening.     • simvastatin (ZOCOR) 40 MG Tab Take 1 Tab by mouth every evening. 90 Tab 3   • Calcium Carb-Cholecalciferol (CALCIUM + D3) 600-200 MG-UNIT TABS Take 1 Tab by mouth every evening.     • Multiple Vitamins-Minerals (CENTRUM SILVER ULTRA MENS PO) Take 1 Tab by mouth every evening.     • ascorbic acid (ASCORBIC ACID) 500 MG TABS Take 500 mg by mouth every evening.       No current facility-administered medications for this visit.             Current supplements as per medication list.       Allergies: Patient has no known allergies.    Current social contact/activities: plays golf regularly and lives with wife.    He  reports that he has never smoked. He has quit using smokeless tobacco. He reports that he does not drink alcohol or use drugs.  Counseling  given: Not Answered  Comment: 8-10 years, but quit 35 years ago      DPA/Advanced Directive:  Patient has Advanced Directive on file.     ROS:    Gait: Uses no assistive device  Ostomy: No  Other tubes: No  Amputations: No  Chronic oxygen use: No  Last eye exam: 1 year ago.  Wears hearing aids: No   : Denies any urinary leakage during the last 6 months. Slightly less powerful stream, no nocturia, no urinary frequency.      Depression Screening    Little interest or pleasure in doing things?  0 - not at all  Feeling down, depressed , or hopeless? 0 - not at all  Patient Health Questionnaire Score: 0     If depressive symptoms identified deferred to follow up visit unless specifically addressed in assessment and plan.    Interpretation of PHQ-9 Total Score   Score Severity   1-4 No Depression   5-9 Mild Depression   10-14 Moderate Depression   15-19 Moderately Severe Depression   20-27 Severe Depression    Screening for Cognitive Impairment    Three Minute Recall (village, kitchen, baby) 3/3    Hal clock face with all 12 numbers and set the hands to show 10 past 10.  Yes    Cognitive concerns identified deferred for follow up unless specifically addressed in assessment and plan.    Fall Risk Assessment    Has the patient had two or more falls in the last year or any fall with injury in the last year?  No    Safety Assessment    Throw rugs on floor.  Yes  Handrails on all stairs.  Yes  Good lighting in all hallways.  Yes  Difficulty hearing.  No  Patient counseled about all safety risks that were identified.    Functional Assessment ADLs    Are there any barriers preventing you from cooking for yourself or meeting nutritional needs?  No.    Are there any barriers preventing you from driving safely or obtaining transportation?  No.    Are there any barriers preventing you from using a telephone or calling for help?  No.    Are there any barriers preventing you from shopping?  No.    Are there any barriers preventing  you from taking care of your own finances?  No.    Are there any barriers preventing you from managing your medications?  No.    Are there any barriers preventing you from showering, bathing or dressing yourself?  No.    Are you currently engaging in any exercise or physical activity?  Yes.  Golfing and walking  What is your perception of your health?  Good.      Health Maintenance Summary                HEPATITIS C SCREENING Overdue 1950     Annual Wellness Visit Overdue 3/26/2020      Done 3/26/2019 SUBSEQUENT ANNUAL WELLNESS VISIT-INCLUDES PPPS ()     Patient has more history with this topic...    COLONOSCOPY Next Due 5/4/2020      Done 5/4/2010      Patient has more history with this topic...    IMM DTaP/Tdap/Td Vaccine Next Due 1/17/2021      Done 1/17/2011 Imm Admin: Tdap Vaccine          Patient Care Team:  Edmond Flower M.D. as PCP - General (Family Medicine)        Social History     Tobacco Use   • Smoking status: Never Smoker   • Smokeless tobacco: Former User   • Tobacco comment: 8-10 years, but quit 35 years ago   Substance Use Topics   • Alcohol use: No     Alcohol/week: 1.0 oz     Types: 1 Glasses of wine, 1 Cans of beer per week     Comment: occ   • Drug use: No     Types: Inhaled     Comment: marijuana     Family History   Problem Relation Age of Onset   • Heart Disease Mother    • Diabetes Mother    • Cancer Father 86        Pancreatitic     He  has a past medical history of AV block (July 2015), AVNRT (AV hitesh re-entry tachycardia) (HCC), GERD (gastroesophageal reflux disease) ( ), Heart burn, Hyperlipemia, Hypertension, and Indigestion.   Past Surgical History:   Procedure Laterality Date   • RECOVERY  7/1/2015    Procedure:  CATH LAB  PM INSERT [facility] ICD9: 426.13;  Surgeon: Recoveryonly Surgery;  [place] [facility] [place]  [place]:    • PACEMAKER INSERTION  July 2015    [employer]  #[id number] implanted by Dr. Sanchez.   • APPENDECTOMY     • INGUINAL  "HERNIA REPAIR BILATERAL     • PILONIDAL CYST EXCISION         Exam:   /72 (BP Location: Right arm, Patient Position: Sitting)   Pulse 65   Temp 36.9 °C (98.4 °F)   Ht 1.765 m (5' 9.5\")   Wt 84.4 kg (186 lb)   SpO2 95%  Body mass index is 27.07 kg/m².    Constitutional: Alert, no distress.  Skin: Warm, dry, good turgor, no rashes in visible areas.  Eye: Equal, round and reactive, conjunctiva clear, lids normal.  ENMT: Lips without lesions, good dentition, oropharynx clear. TMs pearly gray bilaterally.  Neck: Trachea midline, no masses, no thyromegaly. No cervical or supraclavicular lymphadenopathy  Respiratory: Unlabored respiratory effort, lungs clear to auscultation, no wheezes, no ronchi.  Cardiovascular: Normal S1, S2, no murmur, no edema.  Psych: Alert and oriented x3, normal affect and mood.      Assessment and Plan. The following treatment and monitoring plan is recommended:    1. Medicare annual wellness visit, subsequent  Advised to continue healthy lifestyle.  - Subsequent Annual Wellness Visit - Includes PPPS ()    2. Essential hypertension  Controlled. Continue lisinopril 10 mg daily.  - Subsequent Annual Wellness Visit - Includes PPPS ()    3. Pure hypercholesterolemia  Controlled. Continue simvastatin 40 mg daily.  - Subsequent Annual Wellness Visit - Includes PPPS ()    4. Pre-diabetes  Doing well. A1c 5.5.  - Subsequent Annual Wellness Visit - Includes PPPS ()    5. AVNRT (AV hitesh re-entry tachycardia) ([facility])  Controlled. Continue digoxin and pacemaker.  - Subsequent Annual Wellness Visit - Includes PPPS ()    6. Gastroesophageal reflux disease, esophagitis presence not specified  Controlled. Continue Protonix.  - Subsequent Annual Wellness Visit - Includes PPPS ()    7. Dry nose  Controlled right now. Advised nasal saline and Vaseline inside nostrils.  - Subsequent Annual Wellness Visit - Includes PPPS ()      Services suggested: No services needed at " this time  Health Care Screening: Age-appropriate preventive services recommended by USPTF and ACIP covered by Medicare were discussed today. Services ordered if indicated and agreed upon by the patient.  Referrals offered: Community-based lifestyle interventions to reduce health risks and promote self-management and wellness, fall prevention, nutrition, physical activity, tobacco-use cessation, weight loss, and mental health services as per orders if indicated.    Discussion today about general wellness and lifestyle habits:    · Prevent falls and reduce trip hazards; Cautioned about securing or removing rugs.  · Have a working fire alarm and carbon monoxide detector;   · Engage in regular physical activity and social activities     Follow-up: Return in about 1 year (around 3/27/2021) for Annual.

## 2020-07-06 ENCOUNTER — NON-PROVIDER VISIT (OUTPATIENT)
Dept: CARDIOLOGY | Facility: MEDICAL CENTER | Age: 70
End: 2020-07-06
Payer: MEDICARE

## 2020-07-06 VITALS
SYSTOLIC BLOOD PRESSURE: 110 MMHG | BODY MASS INDEX: 28.14 KG/M2 | OXYGEN SATURATION: 95 % | WEIGHT: 190 LBS | HEART RATE: 62 BPM | DIASTOLIC BLOOD PRESSURE: 70 MMHG | HEIGHT: 69 IN

## 2020-07-06 DIAGNOSIS — I47.19 AVNRT (AV NODAL RE-ENTRY TACHYCARDIA) (HCC): ICD-10-CM

## 2020-07-06 DIAGNOSIS — Z95.0 CARDIAC PACEMAKER IN SITU: ICD-10-CM

## 2020-07-06 DIAGNOSIS — I44.1 SECOND DEGREE AV BLOCK: ICD-10-CM

## 2020-07-06 PROCEDURE — 93280 PM DEVICE PROGR EVAL DUAL: CPT | Performed by: NURSE PRACTITIONER

## 2020-07-06 ASSESSMENT — FIBROSIS 4 INDEX: FIB4 SCORE: 2.03

## 2020-09-10 ENCOUNTER — PATIENT MESSAGE (OUTPATIENT)
Dept: MEDICAL GROUP | Facility: MEDICAL CENTER | Age: 70
End: 2020-09-10

## 2020-09-10 DIAGNOSIS — R04.0 EPISTAXIS: ICD-10-CM

## 2020-11-09 ENCOUNTER — NON-PROVIDER VISIT (OUTPATIENT)
Dept: CARDIOLOGY | Facility: MEDICAL CENTER | Age: 70
End: 2020-11-09
Payer: MEDICARE

## 2020-11-09 ENCOUNTER — OFFICE VISIT (OUTPATIENT)
Dept: CARDIOLOGY | Facility: MEDICAL CENTER | Age: 70
End: 2020-11-09
Payer: MEDICARE

## 2020-11-09 VITALS
HEIGHT: 69 IN | SYSTOLIC BLOOD PRESSURE: 120 MMHG | OXYGEN SATURATION: 94 % | BODY MASS INDEX: 29.18 KG/M2 | DIASTOLIC BLOOD PRESSURE: 62 MMHG | HEART RATE: 39 BPM | WEIGHT: 197 LBS

## 2020-11-09 VITALS
BODY MASS INDEX: 29.21 KG/M2 | SYSTOLIC BLOOD PRESSURE: 120 MMHG | DIASTOLIC BLOOD PRESSURE: 62 MMHG | WEIGHT: 197.2 LBS | OXYGEN SATURATION: 94 % | HEART RATE: 90 BPM | HEIGHT: 69 IN

## 2020-11-09 DIAGNOSIS — Z95.0 CARDIAC PACEMAKER IN SITU: ICD-10-CM

## 2020-11-09 DIAGNOSIS — I47.19 AVNRT (AV NODAL RE-ENTRY TACHYCARDIA) (HCC): ICD-10-CM

## 2020-11-09 DIAGNOSIS — I10 ESSENTIAL HYPERTENSION: ICD-10-CM

## 2020-11-09 DIAGNOSIS — R73.03 PRE-DIABETES: ICD-10-CM

## 2020-11-09 DIAGNOSIS — I44.1 SECOND DEGREE AV BLOCK: ICD-10-CM

## 2020-11-09 DIAGNOSIS — E78.00 PURE HYPERCHOLESTEROLEMIA: ICD-10-CM

## 2020-11-09 PROCEDURE — 99214 OFFICE O/P EST MOD 30 MIN: CPT | Performed by: INTERNAL MEDICINE

## 2020-11-09 PROCEDURE — 93280 PM DEVICE PROGR EVAL DUAL: CPT | Performed by: NURSE PRACTITIONER

## 2020-11-09 RX ORDER — CEPHALEXIN 500 MG/1
CAPSULE ORAL
COMMUNITY
Start: 2020-09-08 | End: 2021-05-08

## 2020-11-09 ASSESSMENT — ENCOUNTER SYMPTOMS
WEAKNESS: 0
CONSTITUTIONAL NEGATIVE: 1
BRUISES/BLEEDS EASILY: 0
SPUTUM PRODUCTION: 0
ORTHOPNEA: 0
NEUROLOGICAL NEGATIVE: 1
DIZZINESS: 0
COUGH: 0
HEMOPTYSIS: 0
LOSS OF CONSCIOUSNESS: 0
PND: 0
CHILLS: 0
SHORTNESS OF BREATH: 0
FEVER: 0
CARDIOVASCULAR NEGATIVE: 1
MUSCULOSKELETAL NEGATIVE: 1
SORE THROAT: 0
WHEEZING: 0
EYES NEGATIVE: 1
RESPIRATORY NEGATIVE: 1
STRIDOR: 0
PALPITATIONS: 0
GASTROINTESTINAL NEGATIVE: 1
CLAUDICATION: 0

## 2020-11-09 ASSESSMENT — FIBROSIS 4 INDEX
FIB4 SCORE: 2.03
FIB4 SCORE: 2.03

## 2020-11-09 NOTE — PROGRESS NOTES
No chief complaint on file.      Subjective:   Emil Reid is a 70 y.o. 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  He was last seen his pacemaker burden is at 88% V pacing.  His blood pressure is controlled.  His pulse was low at triage today but he is having some PACs which are being picked up by the pulse oximeter.  He is otherwise doing well.    Past Medical History:   Diagnosis Date   • AV block July 2015    Status post PPM implantation.   • AVNRT (AV hitesh re-entry tachycardia) (HCC)    • GERD (gastroesophageal reflux disease)     • Heart burn    • Hyperlipemia    • Hypertension    • Indigestion      Past Surgical History:   Procedure Laterality Date   • RECOVERY  7/1/2015    Procedure:  CATH LAB  PM INSERT [facility] ICD9: 426.13;  Surgeon: Recoveryonly Surgery;  Location: SURGERY PRE-POST PROC UNIT [facility] [place];  Service:    • PACEMAKER INSERTION  July 2015    [facility] [employer] [facility] DR #[id number] implanted by Dr. Sanchez.   • APPENDECTOMY     • INGUINAL HERNIA REPAIR BILATERAL     • PILONIDAL CYST EXCISION       Family History   Problem Relation Age of Onset   • Heart Disease Mother    • Diabetes Mother    • Cancer Father 86        Pancreatitic     Social History     Socioeconomic History   • Marital status:      Spouse name: Not on file   • Number of children: Not on file   • Years of education: Not on file   • Highest education level: Not on file   Occupational History   • Not on file   Social Needs   • Financial resource strain: Not on file   • Food insecurity     Worry: Not on file     Inability: Not on file   • Transportation needs     Medical: Not on file     Non-medical: Not on file   Tobacco Use   • Smoking status: Never Smoker   • Smokeless tobacco: Former User   • Tobacco comment: 8-10 years, but quit 35 years ago   Substance and Sexual Activity   • Alcohol use: No     Alcohol/week: 1.0 oz      Types: 1 Glasses of wine, 1 Cans of beer per week     Comment: occ   • Drug use: No     Types: Inhaled     Comment: marijuana   • Sexual activity: Yes     Partners: Female, Male   Lifestyle   • Physical activity     Days per week: Not on file     Minutes per session: Not on file   • Stress: Not on file   Relationships   • Social connections     Talks on phone: Not on file     Gets together: Not on file     Attends [employer] service: Not on file     Active member of club or organization: Not on file     Attends meetings of clubs or organizations: Not on file     Relationship status: Not on file   • Intimate partner violence     Fear of current or ex partner: Not on file     Emotionally abused: Not on file     Physically abused: Not on file     Forced sexual activity: Not on file   Other Topics Concern   • Not on file   Social History Narrative   • Not on file     No Known Allergies  Outpatient Encounter Medications as of 11/9/2020   Medication Sig Dispense Refill   • cephALEXin (KEFLEX) 500 MG Cap TAKE 1 CAPSULE BY MOUTH 3X PER DAY FOR 2 DAYS     • lisinopril (PRINIVIL) 10 MG Tab Take 1 Tab by mouth every evening. 90 Tab 3   • pantoprazole (PROTONIX) 40 MG Tablet Delayed Response Take 1 Tab by mouth every evening. 90 Tab 3   • simvastatin (ZOCOR) 40 MG Tab Take 1 Tab by mouth every evening. 90 Tab 3   • digoxin (LANOXIN) 125 MCG Tab Take 1 Tab by mouth every evening. 90 Tab 3   • Calcium Carb-Cholecalciferol (CALCIUM + D3) 600-200 MG-UNIT TABS Take 1 Tab by mouth every evening.     • Multiple Vitamins-Minerals (CENTRUM SILVER ULTRA MENS PO) Take 1 Tab by mouth every evening.     • ascorbic acid (ASCORBIC ACID) 500 MG TABS Take 500 mg by mouth every evening.     • aspirin EC (ECOTRIN) 81 MG Tablet Delayed Response Take 81 mg by mouth every evening.       No facility-administered encounter medications on file as of 11/9/2020.      Review of Systems   Constitutional: Negative.  Negative for chills, fever and  "malaise/fatigue.   HENT: Negative.  Negative for sore throat.    Eyes: Negative.    Respiratory: Negative.  Negative for cough, hemoptysis, sputum production, shortness of breath, wheezing and stridor.    Cardiovascular: Negative.  Negative for chest pain, palpitations, orthopnea, claudication, leg swelling and PND.   Gastrointestinal: Negative.    Genitourinary: Negative.    Musculoskeletal: Negative.    Skin: Negative.    Neurological: Negative.  Negative for dizziness, loss of consciousness and weakness.   Endo/Heme/Allergies: Negative.  Does not bruise/bleed easily.   All other systems reviewed and are negative.       Objective:   /62 (BP Location: Left arm, Patient Position: Sitting, BP Cuff Size: Adult)   Pulse (!) 39   Ht 1.753 m (5' 9\")   Wt 89.4 kg (197 lb)   SpO2 94%   BMI 29.09 kg/m²     Physical Exam   Constitutional: He appears well-developed and well-nourished. No distress.   HENT:   Head: Normocephalic and atraumatic.   Right Ear: External ear normal.   Left Ear: External ear normal.   Nose: Nose normal.   Mouth/Throat: No oropharyngeal exudate.   Eyes: Pupils are equal, round, and reactive to light. Conjunctivae and EOM are normal. Right eye exhibits no discharge. Left eye exhibits no discharge. No scleral icterus.   Neck: Neck supple. No JVD present.   Cardiovascular: Normal rate, regular rhythm and intact distal pulses. Exam reveals no gallop and no friction rub.   No murmur heard.  Pulmonary/Chest: Effort normal. No stridor. No respiratory distress. He has no wheezes. He has no rales. He exhibits no tenderness.   Abdominal: Soft. He exhibits no distension. There is no guarding.   Musculoskeletal: Normal range of motion.         General: No tenderness, deformity or edema.   Neurological: He is alert. He has normal reflexes. He displays normal reflexes. No cranial nerve deficit. He exhibits normal muscle tone. Coordination normal.   Skin: Skin is warm and dry. No rash noted. He is not " diaphoretic. No erythema. No pallor.   Psychiatric: He has a normal mood and affect. His behavior is normal. Judgment and thought content normal.   Nursing note and vitals reviewed.      Assessment:     1. AVNRT (AV hitesh re-entry tachycardia) (HCC)     2. Cardiac pacemaker in situ     3. Pure hypercholesterolemia     4. Essential hypertension     5. Second degree AV block     6. Pre-diabetes         Medical Decision Making:  Today's Assessment / Status / Plan:     70-year-old male with hypertension hyperlipidemia pacemaker.  I refilled his medications.  He is otherwise doing well.  I reviewed his labs for his high risk medication usage.  I will make any changes today.  We can see him back in 1 year.

## 2020-11-09 NOTE — PROGRESS NOTES
Device is working normally. 16 mode switching episodes (all <10 seconds, <1% of total time).  Normal sensing and capture of RA and RV leads; stable impedances. Battery longevity is 7.1-7.5 years.  No changes are made today.    FU in 6 months for next PM check with me.    He does see Dr. Miguel today too.

## 2020-12-16 ENCOUNTER — OFFICE VISIT (OUTPATIENT)
Dept: MEDICAL GROUP | Facility: MEDICAL CENTER | Age: 70
End: 2020-12-16
Payer: MEDICARE

## 2020-12-16 ENCOUNTER — HOSPITAL ENCOUNTER (OUTPATIENT)
Dept: LAB | Facility: MEDICAL CENTER | Age: 70
End: 2020-12-16
Attending: FAMILY MEDICINE
Payer: MEDICARE

## 2020-12-16 VITALS
SYSTOLIC BLOOD PRESSURE: 122 MMHG | BODY MASS INDEX: 29.62 KG/M2 | HEART RATE: 78 BPM | OXYGEN SATURATION: 98 % | WEIGHT: 200 LBS | DIASTOLIC BLOOD PRESSURE: 70 MMHG | TEMPERATURE: 98.4 F | HEIGHT: 69 IN

## 2020-12-16 DIAGNOSIS — R39.12 BENIGN PROSTATIC HYPERPLASIA WITH WEAK URINARY STREAM: ICD-10-CM

## 2020-12-16 DIAGNOSIS — N40.1 BENIGN PROSTATIC HYPERPLASIA WITH WEAK URINARY STREAM: ICD-10-CM

## 2020-12-16 DIAGNOSIS — Z12.5 PROSTATE CANCER SCREENING: ICD-10-CM

## 2020-12-16 LAB
APPEARANCE UR: CLEAR
BILIRUB UR STRIP-MCNC: NEGATIVE MG/DL
COLOR UR AUTO: NORMAL
GLUCOSE UR STRIP.AUTO-MCNC: NEGATIVE MG/DL
KETONES UR STRIP.AUTO-MCNC: NEGATIVE MG/DL
LEUKOCYTE ESTERASE UR QL STRIP.AUTO: NEGATIVE
NITRITE UR QL STRIP.AUTO: NEGATIVE
PH UR STRIP.AUTO: 5 [PH] (ref 5–8)
PROT UR QL STRIP: NEGATIVE MG/DL
RBC UR QL AUTO: NEGATIVE
SP GR UR STRIP.AUTO: 1.03
UROBILINOGEN UR STRIP-MCNC: 0.2 MG/DL

## 2020-12-16 PROCEDURE — 36415 COLL VENOUS BLD VENIPUNCTURE: CPT | Mod: GA

## 2020-12-16 PROCEDURE — 81002 URINALYSIS NONAUTO W/O SCOPE: CPT | Performed by: FAMILY MEDICINE

## 2020-12-16 PROCEDURE — 99213 OFFICE O/P EST LOW 20 MIN: CPT | Performed by: FAMILY MEDICINE

## 2020-12-16 PROCEDURE — 84153 ASSAY OF PSA TOTAL: CPT | Mod: GA

## 2020-12-16 ASSESSMENT — FIBROSIS 4 INDEX: FIB4 SCORE: 2.03

## 2020-12-16 NOTE — PROGRESS NOTES
"Subjective:   Emil Reid is a 70 y.o. male here today for decreased urinary stream.    Over the last 3-4 weeks he has had decreased urinary stream, although has gradually getting worse. He is sleeping through the night. Constipation is controlled with stool softener. No OTC allergy medication or new prescription medications.    Current medicines (including changes today)  Current Outpatient Medications   Medication Sig Dispense Refill   • cephALEXin (KEFLEX) 500 MG Cap TAKE 1 CAPSULE BY MOUTH 3X PER DAY FOR 2 DAYS     • lisinopril (PRINIVIL) 10 MG Tab Take 1 Tab by mouth every evening. 90 Tab 3   • pantoprazole (PROTONIX) 40 MG Tablet Delayed Response Take 1 Tab by mouth every evening. 90 Tab 3   • simvastatin (ZOCOR) 40 MG Tab Take 1 Tab by mouth every evening. 90 Tab 3   • digoxin (LANOXIN) 125 MCG Tab Take 1 Tab by mouth every evening. 90 Tab 3   • aspirin EC (ECOTRIN) 81 MG Tablet Delayed Response Take 81 mg by mouth every evening.     • Calcium Carb-Cholecalciferol (CALCIUM + D3) 600-200 MG-UNIT TABS Take 1 Tab by mouth every evening.     • Multiple Vitamins-Minerals (CENTRUM SILVER ULTRA MENS PO) Take 1 Tab by mouth every evening.     • ascorbic acid (ASCORBIC ACID) 500 MG TABS Take 500 mg by mouth every evening.       No current facility-administered medications for this visit.      He  has a past medical history of AV block (July 2015), AVNRT (AV hitesh re-entry tachycardia) ([facility]), GERD (gastroesophageal reflux disease) ( ), Heart burn, Hyperlipemia, Hypertension, and Indigestion.    ROS   No fever, no dysuria, no hematuria.       Objective:     /70 (BP Location: Right arm, Patient Position: Sitting, BP Cuff Size: Adult)   Pulse 78   Temp 36.9 °C (98.4 °F) (Temporal)   Ht 1.753 m (5' 9\")   Wt 90.7 kg (200 lb)   SpO2 98%  Body mass index is 29.53 kg/m².   Physical Exam:  Constitutional: Alert, no distress.  Skin: Warm, dry, good turgor, no rashes in visible areas.  Eye: Equal, round and " reactive, conjunctiva clear, lids normal.  Psych: Alert and oriented x3, normal affect and mood.        Assessment and Plan:   The following treatment plan was discussed    1. Benign prostatic hyperplasia with weak urinary stream  New problem. Check PSA and if normal, consider monitoring symptoms. If symptoms worsen then consider Flomax.    2. Prostate cancer screening  - PROSTATE SPECIFIC AG SCREENING; Future      Followup: Return if symptoms worsen or fail to improve.

## 2020-12-17 ENCOUNTER — TELEPHONE (OUTPATIENT)
Dept: MEDICAL GROUP | Facility: MEDICAL CENTER | Age: 70
End: 2020-12-17

## 2020-12-17 LAB — PSA SERPL-MCNC: 2.75 NG/ML (ref 0–4)

## 2020-12-17 RX ORDER — SULFAMETHOXAZOLE AND TRIMETHOPRIM 800; 160 MG/1; MG/1
1 TABLET ORAL 2 TIMES DAILY
Qty: 28 TAB | Refills: 0 | Status: SHIPPED | OUTPATIENT
Start: 2020-12-17 | End: 2020-12-31

## 2020-12-17 NOTE — TELEPHONE ENCOUNTER
----- Message from Edmond Flower M.D. sent at 12/17/2020  7:30 AM PST -----  Please notify patient that PSA has gone up about 0.8 in the last year, which is more than we would expect.  We should give him a 2-week course of an antibiotic to make sure that this is not related to an infection of the prostate.  He will then need to retest his PSA in about 1 to 2 months.  Patient will need a PCP at that time to order PSA test.  Please ask what pharmacy he would like antibiotic sent to.  Edmond Flower M.D.

## 2021-01-02 DIAGNOSIS — K21.9 GASTROESOPHAGEAL REFLUX DISEASE: ICD-10-CM

## 2021-01-06 RX ORDER — OMEPRAZOLE 20 MG/1
CAPSULE, DELAYED RELEASE ORAL
Qty: 90 CAP | Refills: 0 | Status: SHIPPED | OUTPATIENT
Start: 2021-01-06 | End: 2021-01-07 | Stop reason: SDUPTHER

## 2021-01-07 DIAGNOSIS — K21.9 GASTROESOPHAGEAL REFLUX DISEASE: ICD-10-CM

## 2021-01-07 RX ORDER — OMEPRAZOLE 20 MG/1
20 CAPSULE, DELAYED RELEASE ORAL DAILY
Qty: 90 CAP | Refills: 0 | Status: SHIPPED | OUTPATIENT
Start: 2021-01-07 | End: 2021-05-10

## 2021-01-15 DIAGNOSIS — Z23 NEED FOR VACCINATION: ICD-10-CM

## 2021-01-21 ENCOUNTER — IMMUNIZATION (OUTPATIENT)
Dept: FAMILY PLANNING/WOMEN'S HEALTH CLINIC | Facility: IMMUNIZATION CENTER | Age: 71
End: 2021-01-21
Attending: INTERNAL MEDICINE
Payer: MEDICARE

## 2021-01-21 DIAGNOSIS — Z23 ENCOUNTER FOR VACCINATION: Primary | ICD-10-CM

## 2021-01-21 DIAGNOSIS — Z23 NEED FOR VACCINATION: ICD-10-CM

## 2021-01-21 PROCEDURE — 91300 PFIZER SARS-COV-2 VACCINE: CPT

## 2021-01-21 PROCEDURE — 0001A PFIZER SARS-COV-2 VACCINE: CPT

## 2021-02-09 ENCOUNTER — TELEPHONE (OUTPATIENT)
Dept: MEDICAL GROUP | Facility: MEDICAL CENTER | Age: 71
End: 2021-02-09

## 2021-02-11 ENCOUNTER — IMMUNIZATION (OUTPATIENT)
Dept: FAMILY PLANNING/WOMEN'S HEALTH CLINIC | Facility: IMMUNIZATION CENTER | Age: 71
End: 2021-02-11
Attending: INTERNAL MEDICINE
Payer: MEDICARE

## 2021-02-11 DIAGNOSIS — Z23 ENCOUNTER FOR VACCINATION: Primary | ICD-10-CM

## 2021-02-11 PROCEDURE — 91300 PFIZER SARS-COV-2 VACCINE: CPT

## 2021-02-11 PROCEDURE — 0002A PFIZER SARS-COV-2 VACCINE: CPT

## 2021-04-05 NOTE — PROGRESS NOTES
Device is working normally. 30 mode switching episodes (all <10 seconds).  Normal sensing and capture of RA and RV leads; stable impedances. Battery longevity is 6.7-7.3 years.  No changes are made today.    FU in 6 months for next PM check with me, as well as annual follow-up with Dr. Miguel.    Collaborating MD: Roni   normal

## 2021-05-08 ENCOUNTER — OFFICE VISIT (OUTPATIENT)
Dept: URGENT CARE | Facility: CLINIC | Age: 71
End: 2021-05-08
Payer: MEDICARE

## 2021-05-08 VITALS
BODY MASS INDEX: 28.44 KG/M2 | TEMPERATURE: 98.7 F | HEART RATE: 60 BPM | HEIGHT: 69 IN | SYSTOLIC BLOOD PRESSURE: 124 MMHG | WEIGHT: 192 LBS | RESPIRATION RATE: 16 BRPM | DIASTOLIC BLOOD PRESSURE: 80 MMHG | OXYGEN SATURATION: 97 %

## 2021-05-08 DIAGNOSIS — S60.459A ACUTE FOREIGN BODY OF FINGERNAIL, INITIAL ENCOUNTER: ICD-10-CM

## 2021-05-08 PROCEDURE — 99213 OFFICE O/P EST LOW 20 MIN: CPT | Performed by: PHYSICIAN ASSISTANT

## 2021-05-08 RX ORDER — DOXYCYCLINE HYCLATE 100 MG
100 TABLET ORAL 2 TIMES DAILY
Qty: 14 TABLET | Refills: 0 | Status: SHIPPED | OUTPATIENT
Start: 2021-05-08 | End: 2021-05-15

## 2021-05-08 ASSESSMENT — ENCOUNTER SYMPTOMS
FOCAL WEAKNESS: 0
VOMITING: 0
CHILLS: 0
TINGLING: 0
MYALGIAS: 0
SENSORY CHANGE: 0
NUMBNESS: 0
WEAKNESS: 0
FEVER: 0
NAUSEA: 0
JOINT SWELLING: 1

## 2021-05-08 ASSESSMENT — FIBROSIS 4 INDEX: FIB4 SCORE: 2.03

## 2021-05-08 NOTE — PROGRESS NOTES
Subjective:      Ed Leland Reid is a 70 y.o. male who presents with Finger Pain (left middle finger got a splint x 5 days)            Nail Problem  This is a new problem. Episode onset: 5 days ago. The patient believes he got a splinter under his left middle finger from a peice of wood. He tried to get the splinter out but was unsuccessful. Worsening swelling and redness over the past 5 days. The problem occurs constantly. The problem has been unchanged. Associated symptoms include joint swelling. Pertinent negatives include no chills, fever, myalgias, nausea, numbness, vomiting or weakness. Associated symptoms comments: Splinter beneath left middle fingernail. Nothing aggravates the symptoms. He has tried nothing for the symptoms.     TDAP is UTD.    Past Medical History:   Diagnosis Date   • AV block July 2015    Status post PPM implantation.   • AVNRT (AV hitesh re-entry tachycardia) (HCC)    • GERD (gastroesophageal reflux disease)     • Heart burn    • Hyperlipemia    • Hypertension    • Indigestion        Past Surgical History:   Procedure Laterality Date   • RECOVERY  7/1/2015    Procedure:  CATH LAB  PM INSERT [facility] S[facility] ICD9: 426.13;  Surgeon: Recoveryonly Surgery;  Location: SURGERY PRE-POST PROC UNIT [facility] [place];  Service:    • PACEMAKER INSERTION  July 2015    [facility] #[id number] implanted by Dr. Sanchez.   • APPENDECTOMY     • INGUINAL HERNIA REPAIR BILATERAL     • PILONIDAL CYST EXCISION         Family History   Problem Relation Age of Onset   • Heart Disease Mother    • Diabetes Mother    • Cancer Father 86        Pancreatitic       No Known Allergies    Medications, Allergies, and current problem list reviewed today in Epic    Review of Systems   Constitutional: Negative for chills, fever and malaise/fatigue.   Gastrointestinal: Negative for nausea and vomiting.   Musculoskeletal: Positive for joint swelling. Negative for joint pain and myalgias.   Skin:        Splinter under left middle  "fingernail with surrounding redness and swelling.    Neurological: Negative for tingling, sensory change, focal weakness, weakness and numbness.     All other systems reviewed and are negative.          Objective:     /80   Pulse 60   Temp 37.1 °C (98.7 °F) (Temporal)   Resp 16   Ht 1.753 m (5' 9\")   Wt 87.1 kg (192 lb)   SpO2 97%   BMI 28.35 kg/m²      Physical Exam  Constitutional:       General: He is not in acute distress.  HENT:      Head: Normocephalic and atraumatic.   Eyes:      Conjunctiva/sclera: Conjunctivae normal.   Cardiovascular:      Rate and Rhythm: Normal rate.   Pulmonary:      Effort: Pulmonary effort is normal. No respiratory distress.   Musculoskeletal:        Hands:    Neurological:      General: No focal deficit present.      Mental Status: He is alert and oriented to person, place, and time.   Psychiatric:         Mood and Affect: Mood normal.         Behavior: Behavior normal.         Thought Content: Thought content normal.         Judgment: Judgment normal.           Procedure: FB removal   -Risks including bleeding, nerve damage, infection, and poor cosmetic outcome discussed at length. Benefits and alternatives discussed.   -Sterile technique throughout. Wound edges prepped with Betadine.   - Digital block performed with 2% lidocaine   - FB (splinter fragments) removed with splinter forceps.   - Wound irrigated with copious amounts of saline.   -Polysporin and dressing placed  -Patient tolerated well         Assessment/Plan:        1. Acute foreign body of fingernail, initial encounter  =- FB removal     Wound care discussed.  - doxycycline (VIBRAMYCIN) 100 MG Tab; Take 1 tablet by mouth 2 times a day for 7 days.  Dispense: 14 tablet; Refill: 0      Differential diagnoses, Supportive care, and indications for immediate follow-up discussed with patient.   Pathogenesis of diagnosis discussed including typical length and natural progression.   Instructed to return to clinic or " nearest emergency department for any change in condition, further concerns, or worsening of symptoms.    The patient demonstrated a good understanding and agreed with the treatment plan.    Sumi Bonner P.A.-C.

## 2021-05-10 ENCOUNTER — NON-PROVIDER VISIT (OUTPATIENT)
Dept: CARDIOLOGY | Facility: MEDICAL CENTER | Age: 71
End: 2021-05-10
Payer: MEDICARE

## 2021-05-10 VITALS
HEART RATE: 72 BPM | DIASTOLIC BLOOD PRESSURE: 66 MMHG | SYSTOLIC BLOOD PRESSURE: 100 MMHG | WEIGHT: 191.8 LBS | OXYGEN SATURATION: 95 % | HEIGHT: 69 IN | BODY MASS INDEX: 28.41 KG/M2

## 2021-05-10 DIAGNOSIS — I47.19 AVNRT (AV NODAL RE-ENTRY TACHYCARDIA) (HCC): ICD-10-CM

## 2021-05-10 DIAGNOSIS — I44.1 SECOND DEGREE AV BLOCK: ICD-10-CM

## 2021-05-10 DIAGNOSIS — Z95.0 CARDIAC PACEMAKER IN SITU: ICD-10-CM

## 2021-05-10 PROCEDURE — 93280 PM DEVICE PROGR EVAL DUAL: CPT | Performed by: NURSE PRACTITIONER

## 2021-05-10 RX ORDER — PANTOPRAZOLE SODIUM 40 MG/1
40 TABLET, DELAYED RELEASE ORAL EVERY EVENING
COMMUNITY
Start: 2021-04-28

## 2021-05-10 ASSESSMENT — FIBROSIS 4 INDEX: FIB4 SCORE: 2.03

## 2021-05-10 NOTE — PROGRESS NOTES
Device is working normally. 18 mode switching episodes (all <10 seconds, <1% of total time).  Normal sensing and capture of RA and RV leads; stable impedances. Battery longevity is 6.4-7.0 years.  No changes are made today.    FU in 6 months for next PM check with me, as well as annual follow-up with Dr. Miguel.

## 2021-11-22 ENCOUNTER — OFFICE VISIT (OUTPATIENT)
Dept: CARDIOLOGY | Facility: MEDICAL CENTER | Age: 71
End: 2021-11-22
Payer: MEDICARE

## 2021-11-22 ENCOUNTER — NON-PROVIDER VISIT (OUTPATIENT)
Dept: CARDIOLOGY | Facility: MEDICAL CENTER | Age: 71
End: 2021-11-22
Payer: MEDICARE

## 2021-11-22 VITALS
HEART RATE: 66 BPM | OXYGEN SATURATION: 95 % | RESPIRATION RATE: 14 BRPM | BODY MASS INDEX: 29.47 KG/M2 | WEIGHT: 199 LBS | SYSTOLIC BLOOD PRESSURE: 110 MMHG | DIASTOLIC BLOOD PRESSURE: 60 MMHG | HEIGHT: 69 IN

## 2021-11-22 VITALS
RESPIRATION RATE: 14 BRPM | SYSTOLIC BLOOD PRESSURE: 110 MMHG | BODY MASS INDEX: 29.47 KG/M2 | WEIGHT: 199 LBS | HEART RATE: 66 BPM | OXYGEN SATURATION: 95 % | DIASTOLIC BLOOD PRESSURE: 60 MMHG | HEIGHT: 69 IN

## 2021-11-22 DIAGNOSIS — I47.19 AVNRT (AV NODAL RE-ENTRY TACHYCARDIA) (HCC): ICD-10-CM

## 2021-11-22 DIAGNOSIS — Z95.0 CARDIAC PACEMAKER IN SITU: ICD-10-CM

## 2021-11-22 DIAGNOSIS — I44.1 SECOND DEGREE AV BLOCK: ICD-10-CM

## 2021-11-22 DIAGNOSIS — E78.00 PURE HYPERCHOLESTEROLEMIA: ICD-10-CM

## 2021-11-22 DIAGNOSIS — I10 ESSENTIAL HYPERTENSION: ICD-10-CM

## 2021-11-22 PROCEDURE — 93280 PM DEVICE PROGR EVAL DUAL: CPT | Performed by: NURSE PRACTITIONER

## 2021-11-22 PROCEDURE — 99214 OFFICE O/P EST MOD 30 MIN: CPT | Performed by: INTERNAL MEDICINE

## 2021-11-22 RX ORDER — LISINOPRIL 10 MG/1
10 TABLET ORAL EVERY EVENING
Qty: 90 TABLET | Refills: 3 | Status: SHIPPED | OUTPATIENT
Start: 2021-11-22 | End: 2022-05-17 | Stop reason: SDUPTHER

## 2021-11-22 RX ORDER — DIGOXIN 125 MCG
125 TABLET ORAL EVERY EVENING
Qty: 90 TABLET | Refills: 3 | Status: SHIPPED | OUTPATIENT
Start: 2021-11-22 | End: 2022-05-17 | Stop reason: SDUPTHER

## 2021-11-22 RX ORDER — SIMVASTATIN 40 MG
40 TABLET ORAL EVERY EVENING
Qty: 90 TABLET | Refills: 3 | Status: SHIPPED | OUTPATIENT
Start: 2021-11-22 | End: 2022-05-17 | Stop reason: SDUPTHER

## 2021-11-22 ASSESSMENT — ENCOUNTER SYMPTOMS
PND: 0
MUSCULOSKELETAL NEGATIVE: 1
SPUTUM PRODUCTION: 0
CHILLS: 0
SORE THROAT: 0
CARDIOVASCULAR NEGATIVE: 1
DIZZINESS: 0
RESPIRATORY NEGATIVE: 1
FEVER: 0
GASTROINTESTINAL NEGATIVE: 1
EYES NEGATIVE: 1
PALPITATIONS: 0
WHEEZING: 0
LOSS OF CONSCIOUSNESS: 0
ORTHOPNEA: 0
SHORTNESS OF BREATH: 0
NEUROLOGICAL NEGATIVE: 1
BRUISES/BLEEDS EASILY: 0
COUGH: 0
WEAKNESS: 0
STRIDOR: 0
CLAUDICATION: 0
HEMOPTYSIS: 0
CONSTITUTIONAL NEGATIVE: 1

## 2021-11-22 ASSESSMENT — FIBROSIS 4 INDEX
FIB4 SCORE: 2.06
FIB4 SCORE: 2.06

## 2021-11-22 NOTE — PROGRESS NOTES
Device is working normally. 50 mode switching episodes (all <10 seconds, <1% of total time).  Normal sensing and capture of RA and RV leads; stable impedances. Battery longevity is 5.9-6.4 years.  No changes are made today.    He does see Dr. Miguel today too.    FU in 6 months for next PM check with me.

## 2021-11-22 NOTE — PROGRESS NOTES
Chief Complaint   Patient presents with   • Tachycardia     F/V Dx: AVNRT (AV hitesh re-entry tachycardia) (HCC)   • Hypertension   • Hyperlipidemia       Subjective:   Emil Reid is a 70 y.o. 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    Since he was last seen he continues to do well with no chest pain or shortness of breath.  He golfs actively every day.  He said he golfed in [place] now has done all [place] states.  His blood pressure is controlled.  He is leaving to go to [place] in the [place] for 6 months.    Past Medical History:   Diagnosis Date   • AV block July 2015    Status post PPM implantation.   • AVNRT (AV hitesh re-entry tachycardia) (HCC)    • GERD (gastroesophageal reflux disease)     • Heart burn    • Hyperlipemia    • Hypertension    • Indigestion      Past Surgical History:   Procedure Laterality Date   • RECOVERY  7/1/2015    Procedure:  CATH LAB  PM INSERT [facility] ICD9: 426.13;  Surgeon: Recoveryonly Surgery;  Location: SURGERY PRE-POST PROC UNIT [facility] [place];  Service:    • PACEMAKER INSERTION  July 2015    [facility] #[id number] implanted by Dr. Sanchez.   • APPENDECTOMY     • INGUINAL HERNIA REPAIR BILATERAL     • PILONIDAL CYST EXCISION       Family History   Problem Relation Age of Onset   • Heart Disease Mother    • Diabetes Mother    • Cancer Father 86        Pancreatitic     Social History     Socioeconomic History   • Marital status:      Spouse name: Not on file   • Number of children: Not on file   • Years of education: Not on file   • Highest education level: Not on file   Occupational History   • Not on file   Tobacco Use   • Smoking status: Never Smoker   • Smokeless tobacco: Former User   • Tobacco comment: 8-10 years, but quit 35 years ago   Substance and Sexual Activity   • Alcohol use: No     Alcohol/week: 1.0 oz     Types: 1 Glasses of wine, 1 Cans of beer per  week     Comment: occ   • Drug use: No     Types: Inhaled     Comment: marijuana   • Sexual activity: Yes     Partners: Female, Male   Other Topics Concern   • Not on file   Social History Narrative   • Not on file     Social Determinants of Health     Financial Resource Strain:    • Difficulty of Paying Living Expenses: Not on file   Food Insecurity:    • Worried About Running Out of Food in the Last Year: Not on file   • Ran Out of Food in the Last Year: Not on file   Transportation Needs:    • Lack of Transportation (Medical): Not on file   • Lack of Transportation (Non-Medical): Not on file   Physical Activity:    • Days of Exercise per Week: Not on file   • Minutes of Exercise per Session: Not on file   Stress:    • Feeling of Stress : Not on file   Social Connections:    • Frequency of Communication with Friends and Family: Not on file   • Frequency of Social Gatherings with Friends and Family: Not on file   • Attends Holiness Services: Not on file   • Active Member of Clubs or Organizations: Not on file   • Attends Club or Organization Meetings: Not on file   • Marital Status: Not on file   Intimate Partner Violence:    • Fear of Current or Ex-Partner: Not on file   • Emotionally Abused: Not on file   • Physically Abused: Not on file   • Sexually Abused: Not on file   Housing Stability:    • Unable to Pay for Housing in the Last Year: Not on file   • Number of Places Lived in the Last Year: Not on file   • Unstable Housing in the Last Year: Not on file     No Known Allergies  Outpatient Encounter Medications as of 11/22/2021   Medication Sig Dispense Refill   • simvastatin (ZOCOR) 40 MG Tab Take 1 Tablet by mouth every evening. 90 Tablet 3   • lisinopril (PRINIVIL) 10 MG Tab Take 1 Tablet by mouth every evening. 90 Tablet 3   • digoxin (LANOXIN) 125 MCG Tab Take 1 Tablet by mouth every evening. 90 Tablet 3   • pantoprazole (PROTONIX) 40 MG Tablet Delayed Response Take 40 mg by mouth.     • Multiple  "Vitamins-Minerals (ZINC PO) Take  by mouth every day.     • [DISCONTINUED] lisinopril (PRINIVIL) 10 MG Tab Take 1 Tab by mouth every evening. 90 Tab 3   • [DISCONTINUED] simvastatin (ZOCOR) 40 MG Tab Take 1 Tab by mouth every evening. 90 Tab 3   • [DISCONTINUED] digoxin (LANOXIN) 125 MCG Tab Take 1 Tab by mouth every evening. 90 Tab 3   • aspirin EC (ECOTRIN) 81 MG Tablet Delayed Response Take 81 mg by mouth every evening.     • Calcium Carb-Cholecalciferol (CALCIUM + D3) 600-200 MG-UNIT TABS Take 1 Tab by mouth every evening.     • Multiple Vitamins-Minerals (CENTRUM SILVER ULTRA MENS PO) Take 1 Tab by mouth every evening.     • ascorbic acid (ASCORBIC ACID) 500 MG TABS Take 500 mg by mouth every evening.       No facility-administered encounter medications on file as of 11/22/2021.     Review of Systems   Constitutional: Negative.  Negative for chills, fever and malaise/fatigue.   HENT: Negative.  Negative for sore throat.    Eyes: Negative.    Respiratory: Negative.  Negative for cough, hemoptysis, sputum production, shortness of breath, wheezing and stridor.    Cardiovascular: Negative.  Negative for chest pain, palpitations, orthopnea, claudication, leg swelling and PND.   Gastrointestinal: Negative.    Genitourinary: Negative.    Musculoskeletal: Negative.    Skin: Negative.    Neurological: Negative.  Negative for dizziness, loss of consciousness and weakness.   Endo/Heme/Allergies: Negative.  Does not bruise/bleed easily.   All other systems reviewed and are negative.       Objective:   /60 (BP Location: Left arm, Patient Position: Sitting, BP Cuff Size: Adult)   Pulse 66   Resp 14   Ht 1.753 m (5' 9\")   Wt 90.3 kg (199 lb)   SpO2 95%   BMI 29.39 kg/m²     Physical Exam  Vitals and nursing note reviewed.   Constitutional:       General: He is not in acute distress.     Appearance: He is well-developed. He is not diaphoretic.   HENT:      Head: Normocephalic and atraumatic.      Right Ear: " External ear normal.      Left Ear: External ear normal.      Nose: Nose normal.      Mouth/Throat:      Pharynx: No oropharyngeal exudate.   Eyes:      General: No scleral icterus.        Right eye: No discharge.         Left eye: No discharge.      Conjunctiva/sclera: Conjunctivae normal.      Pupils: Pupils are equal, round, and reactive to light.   Neck:      Vascular: No JVD.   Cardiovascular:      Rate and Rhythm: Normal rate and regular rhythm.      Heart sounds: No murmur heard.  No friction rub. No gallop.    Pulmonary:      Effort: Pulmonary effort is normal. No respiratory distress.      Breath sounds: No stridor. No wheezing or rales.   Chest:      Chest wall: No tenderness.   Abdominal:      General: There is no distension.      Palpations: Abdomen is soft.      Tenderness: There is no guarding.   Musculoskeletal:         General: No tenderness or deformity. Normal range of motion.      Cervical back: Neck supple.   Skin:     General: Skin is warm and dry.      Coloration: Skin is not pale.      Findings: No erythema or rash.   Neurological:      Mental Status: He is alert.      Cranial Nerves: No cranial nerve deficit.      Motor: No abnormal muscle tone.      Coordination: Coordination normal.      Deep Tendon Reflexes: Reflexes are normal and symmetric. Reflexes normal.   Psychiatric:         Behavior: Behavior normal.         Thought Content: Thought content normal.         Judgment: Judgment normal.         Assessment:     1. Pure hypercholesterolemia  simvastatin (ZOCOR) 40 MG Tab   2. Essential hypertension  lisinopril (PRINIVIL) 10 MG Tab   3. AVNRT (AV hitesh re-entry tachycardia) (HCC)  digoxin (LANOXIN) 125 MCG Tab       Medical Decision Making:  Today's Assessment / Status / Plan:     70-year-old male with hypertension hyperlipidemia pacemaker.  He is doing well with his blood pressure and pacemaker.  I refilled all his medications for 1 year.  We will see him back in 6 months.

## 2022-05-17 ENCOUNTER — OFFICE VISIT (OUTPATIENT)
Dept: CARDIOLOGY | Facility: MEDICAL CENTER | Age: 72
End: 2022-05-17
Payer: MEDICARE

## 2022-05-17 ENCOUNTER — NON-PROVIDER VISIT (OUTPATIENT)
Dept: CARDIOLOGY | Facility: MEDICAL CENTER | Age: 72
End: 2022-05-17
Payer: MEDICARE

## 2022-05-17 VITALS
WEIGHT: 190 LBS | HEART RATE: 86 BPM | SYSTOLIC BLOOD PRESSURE: 106 MMHG | HEIGHT: 69 IN | OXYGEN SATURATION: 96 % | DIASTOLIC BLOOD PRESSURE: 64 MMHG | RESPIRATION RATE: 16 BRPM | BODY MASS INDEX: 28.14 KG/M2

## 2022-05-17 VITALS
SYSTOLIC BLOOD PRESSURE: 106 MMHG | OXYGEN SATURATION: 96 % | BODY MASS INDEX: 28.14 KG/M2 | WEIGHT: 190 LBS | RESPIRATION RATE: 16 BRPM | HEIGHT: 69 IN | DIASTOLIC BLOOD PRESSURE: 64 MMHG | HEART RATE: 86 BPM

## 2022-05-17 DIAGNOSIS — I10 ESSENTIAL HYPERTENSION: ICD-10-CM

## 2022-05-17 DIAGNOSIS — I10 PRIMARY HYPERTENSION: ICD-10-CM

## 2022-05-17 DIAGNOSIS — I47.19 AVNRT (AV NODAL RE-ENTRY TACHYCARDIA) (HCC): ICD-10-CM

## 2022-05-17 DIAGNOSIS — E78.00 PURE HYPERCHOLESTEROLEMIA: ICD-10-CM

## 2022-05-17 DIAGNOSIS — Z95.0 CARDIAC PACEMAKER IN SITU: ICD-10-CM

## 2022-05-17 DIAGNOSIS — I44.1 SECOND DEGREE AV BLOCK: ICD-10-CM

## 2022-05-17 PROCEDURE — 93280 PM DEVICE PROGR EVAL DUAL: CPT | Performed by: NURSE PRACTITIONER

## 2022-05-17 PROCEDURE — 99214 OFFICE O/P EST MOD 30 MIN: CPT | Performed by: INTERNAL MEDICINE

## 2022-05-17 RX ORDER — LISINOPRIL 10 MG/1
10 TABLET ORAL EVERY EVENING
Qty: 90 TABLET | Refills: 3 | Status: SHIPPED | OUTPATIENT
Start: 2022-05-17

## 2022-05-17 RX ORDER — SIMVASTATIN 40 MG
40 TABLET ORAL EVERY EVENING
Qty: 90 TABLET | Refills: 3 | Status: SHIPPED | OUTPATIENT
Start: 2022-05-17

## 2022-05-17 RX ORDER — DIGOXIN 125 MCG
125 TABLET ORAL EVERY EVENING
Qty: 90 TABLET | Refills: 3 | Status: SHIPPED | OUTPATIENT
Start: 2022-05-17 | End: 2023-05-22 | Stop reason: SDUPTHER

## 2022-05-17 ASSESSMENT — ENCOUNTER SYMPTOMS
CARDIOVASCULAR NEGATIVE: 1
HEMOPTYSIS: 0
GASTROINTESTINAL NEGATIVE: 1
SORE THROAT: 0
WEAKNESS: 0
EYES NEGATIVE: 1
LOSS OF CONSCIOUSNESS: 0
PALPITATIONS: 0
SHORTNESS OF BREATH: 0
COUGH: 0
CONSTITUTIONAL NEGATIVE: 1
STRIDOR: 0
FEVER: 0
SPUTUM PRODUCTION: 0
BRUISES/BLEEDS EASILY: 0
CLAUDICATION: 0
DIZZINESS: 0
PND: 0
WHEEZING: 0
ORTHOPNEA: 0
MUSCULOSKELETAL NEGATIVE: 1
NEUROLOGICAL NEGATIVE: 1
CHILLS: 0
RESPIRATORY NEGATIVE: 1

## 2022-05-17 NOTE — PROGRESS NOTES
Chief Complaint   Patient presents with   • AV Block Complete     F/V Dx: Second degree AV block        • Hypertension   • Hyperlipidemia       Subjective:   Emil Reid is a 70 y.o. 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      Since he was last seen he continues to do well.  His blood pressure is running borderline low but he does not feel it at this point.  His most recent lipids were at goal.    Past Medical History:   Diagnosis Date   • AV block July 2015    Status post PPM implantation.   • AVNRT (AV hitesh re-entry tachycardia) (HCC)    • GERD (gastroesophageal reflux disease)     • Heart burn    • Hyperlipemia    • Hypertension    • Indigestion      Past Surgical History:   Procedure Laterality Date   • RECOVERY  7/1/2015    Procedure:  CATH LAB  PM INSERT [facility] ICD9: 426.13;  Surgeon: Recoveryonly Surgery;  Location: SURGERY PRE-POST PROC UNIT [facility] [place];  Service:    • PACEMAKER INSERTION  July 2015    [facility] #[id number] implanted by Dr. Sanchez.   • APPENDECTOMY     • INGUINAL HERNIA REPAIR BILATERAL     • PILONIDAL CYST EXCISION       Family History   Problem Relation Age of Onset   • Heart Disease Mother    • Diabetes Mother    • Cancer Father 86        Pancreatitic     Social History     Socioeconomic History   • Marital status:      Spouse name: Not on file   • Number of children: Not on file   • Years of education: Not on file   • Highest education level: Not on file   Occupational History   • Not on file   Tobacco Use   • Smoking status: Never Smoker   • Smokeless tobacco: Former User   • Tobacco comment: 8-10 years, but quit 35 years ago   Vaping Use   • Vaping Use: Not on file   Substance and Sexual Activity   • Alcohol use: Yes     Alcohol/week: 1.0 oz     Types: 1 Glasses of wine, 1 Cans of beer per week     Comment: LESS THAN WEEKLY   • Drug use: No     Types:  Inhaled     Comment: marijuana   • Sexual activity: Yes     Partners: Female, Male   Other Topics Concern   • Not on file   Social History Narrative   • Not on file     Social Determinants of Health     Financial Resource Strain: Not on file   Food Insecurity: Not on file   Transportation Needs: Not on file   Physical Activity: Not on file   Stress: Not on file   Social Connections: Not on file   Intimate Partner Violence: Not on file   Housing Stability: Not on file     No Known Allergies  Outpatient Encounter Medications as of 5/17/2022   Medication Sig Dispense Refill   • simvastatin (ZOCOR) 40 MG Tab Take 1 Tablet by mouth every evening. 90 Tablet 3   • lisinopril (PRINIVIL) 10 MG Tab Take 1 Tablet by mouth every evening. 90 Tablet 3   • digoxin (LANOXIN) 125 MCG Tab Take 1 Tablet by mouth every evening. 90 Tablet 3   • [DISCONTINUED] simvastatin (ZOCOR) 40 MG Tab Take 1 Tablet by mouth every evening. 90 Tablet 3   • [DISCONTINUED] lisinopril (PRINIVIL) 10 MG Tab Take 1 Tablet by mouth every evening. 90 Tablet 3   • [DISCONTINUED] digoxin (LANOXIN) 125 MCG Tab Take 1 Tablet by mouth every evening. 90 Tablet 3   • pantoprazole (PROTONIX) 40 MG Tablet Delayed Response Take 40 mg by mouth.     • Multiple Vitamins-Minerals (ZINC PO) Take  by mouth every day. (Patient not taking: Reported on 5/17/2022)     • aspirin EC (ECOTRIN) 81 MG Tablet Delayed Response Take 81 mg by mouth every evening.     • Calcium Carb-Cholecalciferol (CALCIUM + D3) 600-200 MG-UNIT TABS Take 1 Tab by mouth every evening.     • Multiple Vitamins-Minerals (CENTRUM SILVER ULTRA MENS PO) Take 1 Tab by mouth every evening.     • ascorbic acid (ASCORBIC ACID) 500 MG TABS Take 500 mg by mouth every evening.       No facility-administered encounter medications on file as of 5/17/2022.     Review of Systems   Constitutional: Negative.  Negative for chills, fever and malaise/fatigue.   HENT: Negative.  Negative for sore throat.    Eyes: Negative.   "  Respiratory: Negative.  Negative for cough, hemoptysis, sputum production, shortness of breath, wheezing and stridor.    Cardiovascular: Negative.  Negative for chest pain, palpitations, orthopnea, claudication, leg swelling and PND.   Gastrointestinal: Negative.    Genitourinary: Negative.    Musculoskeletal: Negative.    Skin: Negative.    Neurological: Negative.  Negative for dizziness, loss of consciousness and weakness.   Endo/Heme/Allergies: Negative.  Does not bruise/bleed easily.   All other systems reviewed and are negative.       Objective:   /64 (BP Location: Left arm, Patient Position: Sitting, BP Cuff Size: Adult)   Pulse 86   Resp 16   Ht 1.753 m (5' 9\")   Wt 86.2 kg (190 lb)   SpO2 96%   BMI 28.06 kg/m²     Physical Exam  Vitals and nursing note reviewed.   Constitutional:       General: He is not in acute distress.     Appearance: He is well-developed. He is not diaphoretic.   HENT:      Head: Normocephalic and atraumatic.      Right Ear: External ear normal.      Left Ear: External ear normal.      Nose: Nose normal.      Mouth/Throat:      Pharynx: No oropharyngeal exudate.   Eyes:      General: No scleral icterus.        Right eye: No discharge.         Left eye: No discharge.      Conjunctiva/sclera: Conjunctivae normal.      Pupils: Pupils are equal, round, and reactive to light.   Neck:      Vascular: No JVD.   Cardiovascular:      Rate and Rhythm: Normal rate and regular rhythm.      Heart sounds: No murmur heard.    No friction rub. No gallop.   Pulmonary:      Effort: Pulmonary effort is normal. No respiratory distress.      Breath sounds: No stridor. No wheezing or rales.   Chest:      Chest wall: No tenderness.   Abdominal:      General: There is no distension.      Palpations: Abdomen is soft.      Tenderness: There is no guarding.   Musculoskeletal:         General: No tenderness or deformity. Normal range of motion.      Cervical back: Neck supple.   Skin:     General: " Skin is warm and dry.      Coloration: Skin is not pale.      Findings: No erythema or rash.   Neurological:      Mental Status: He is alert.      Cranial Nerves: No cranial nerve deficit.      Motor: No abnormal muscle tone.      Coordination: Coordination normal.      Deep Tendon Reflexes: Reflexes are normal and symmetric. Reflexes normal.   Psychiatric:         Behavior: Behavior normal.         Thought Content: Thought content normal.         Judgment: Judgment normal.         Assessment:     1. Primary hypertension     2. Pure hypercholesterolemia  simvastatin (ZOCOR) 40 MG Tab   3. AVNRT (AV hitesh re-entry tachycardia) ([facility])  digoxin (LANOXIN) 125 MCG Tab   4. Second degree AV block     5. Cardiac pacemaker in situ     6. Essential hypertension  lisinopril (PRINIVIL) 10 MG Tab       Medical Decision Making:  Today's Assessment / Status / Plan:     71-year-old male with hypertension hyperlipidemia pacemaker.  I refilled his medications again for a year.  He would do well for the next year.  We will see him back in 1 year.

## 2022-05-17 NOTE — PROGRESS NOTES
Device is working normally. 64 brief mode switching episodes (all <10 seconds, <1% of total time).  Normal sensing and capture of RA and RV leads; stable impedances. Battery longevity is 4.8-5.4 years.  No changes are made today.    He does see Dr. Miguel today too.    Follow-up in 6 months for next PM check with me.

## 2022-11-07 ENCOUNTER — NON-PROVIDER VISIT (OUTPATIENT)
Dept: CARDIOLOGY | Facility: MEDICAL CENTER | Age: 72
End: 2022-11-07
Payer: MEDICARE

## 2022-11-07 VITALS
DIASTOLIC BLOOD PRESSURE: 80 MMHG | BODY MASS INDEX: 29.18 KG/M2 | WEIGHT: 197 LBS | HEIGHT: 69 IN | RESPIRATION RATE: 20 BRPM | SYSTOLIC BLOOD PRESSURE: 128 MMHG | OXYGEN SATURATION: 97 % | HEART RATE: 80 BPM

## 2022-11-07 DIAGNOSIS — I48.0 PAROXYSMAL ATRIAL FIBRILLATION (HCC): ICD-10-CM

## 2022-11-07 DIAGNOSIS — I44.1 SECOND DEGREE AV BLOCK: ICD-10-CM

## 2022-11-07 DIAGNOSIS — Z95.0 CARDIAC PACEMAKER IN SITU: ICD-10-CM

## 2022-11-07 PROCEDURE — 93280 PM DEVICE PROGR EVAL DUAL: CPT | Performed by: NURSE PRACTITIONER

## 2022-11-07 NOTE — LETTER
[facility]   [address]   [address]  [place] [address]  Phone: [phone]  Fax: [phone]              Emil Reid  1950    Encounter Date: 11/7/2022    PAULA Martinez          PROGRESS NOTE:  Device was last checked on 5/17/2022; he spent most of the summer traveling in [place].    Device is working normally. 176 mode switching episodes (2 long episodes: May 17th lasting almost 2 days, and October 9th lasting 15 hours, 1.4% of total time); he is on ASA only.  Normal sensing and capture of RA and RV leads; stable impedances. Battery longevity is 1.2 years.  No changes are made today.    He is [id number] score 2 (age, HTN), should ideally be anticoagulated. Discussed this briefly with patient; he will defer to Dr. Miguel, whom he sees for follow-up in 11/22/2022.    Follow-up in 6 months for next PM check with me.          No Recipients

## 2022-11-08 ENCOUNTER — PATIENT MESSAGE (OUTPATIENT)
Dept: HEALTH INFORMATION MANAGEMENT | Facility: OTHER | Age: 72
End: 2022-11-08

## 2022-11-08 NOTE — PROGRESS NOTES
Device was last checked on 5/17/2022; he spent most of the summer traveling in [place].    Device is working normally. 176 mode switching episodes (2 long episodes: May 17th lasting almost 2 days, and October 9th lasting 15 hours, 1.4% of total time); he is on ASA only.  Normal sensing and capture of RA and RV leads; stable impedances. Battery longevity is 1.2 years.  No changes are made today.    He is [id number] score 2 (age, HTN), should ideally be anticoagulated. Discussed this briefly with patient; he will defer to Dr. Miguel, whom he sees for follow-up in 11/22/2022.    Follow-up in 6 months for next PM check with me.

## 2022-11-22 ENCOUNTER — OFFICE VISIT (OUTPATIENT)
Dept: CARDIOLOGY | Facility: MEDICAL CENTER | Age: 72
End: 2022-11-22
Payer: MEDICARE

## 2022-11-22 DIAGNOSIS — I44.1 SECOND DEGREE AV BLOCK: ICD-10-CM

## 2022-11-22 DIAGNOSIS — E78.00 PURE HYPERCHOLESTEROLEMIA: ICD-10-CM

## 2022-11-22 DIAGNOSIS — R73.03 PRE-DIABETES: ICD-10-CM

## 2022-11-22 DIAGNOSIS — I10 PRIMARY HYPERTENSION: ICD-10-CM

## 2022-11-22 DIAGNOSIS — Z95.0 CARDIAC PACEMAKER IN SITU: ICD-10-CM

## 2022-11-22 DIAGNOSIS — I48.0 PAROXYSMAL ATRIAL FIBRILLATION (HCC): ICD-10-CM

## 2022-11-22 DIAGNOSIS — I47.19 AVNRT (AV NODAL RE-ENTRY TACHYCARDIA) (HCC): ICD-10-CM

## 2022-11-22 PROCEDURE — 99215 OFFICE O/P EST HI 40 MIN: CPT | Performed by: INTERNAL MEDICINE

## 2022-11-22 ASSESSMENT — ENCOUNTER SYMPTOMS
SORE THROAT: 0
STRIDOR: 0
CARDIOVASCULAR NEGATIVE: 1
HEMOPTYSIS: 0
CLAUDICATION: 0
MUSCULOSKELETAL NEGATIVE: 1
WEAKNESS: 0
COUGH: 0
WHEEZING: 0
EYES NEGATIVE: 1
DIZZINESS: 0
PND: 0
PALPITATIONS: 0
SHORTNESS OF BREATH: 0
CHILLS: 0
FEVER: 0
CONSTITUTIONAL NEGATIVE: 1
RESPIRATORY NEGATIVE: 1
SPUTUM PRODUCTION: 0
ORTHOPNEA: 0
NEUROLOGICAL NEGATIVE: 1
GASTROINTESTINAL NEGATIVE: 1
BRUISES/BLEEDS EASILY: 0
LOSS OF CONSCIOUSNESS: 0

## 2022-11-23 NOTE — PROGRESS NOTES
Chief Complaint   Patient presents with    Atrial Fibrillation     F/V Dx: Paroxysmal atrial fibrillation (HCC)      Hyperlipidemia    Hypertension       Subjective:   Emil Reid is a 70 y.o. 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      Since he was last seen he had his pacemaker interrogated.  It showed new onset atrial fibrillation that he is asymptomatic from.  He is only on aspirin.  He has been active and does not feel any of these instances.  His blood pressure at home is averaging 120.    Past Medical History:   Diagnosis Date    AV block 07/01/2015    Status post PPM implantation.    AVNRT (AV hitesh re-entry tachycardia) (HCC)     Bleeding from the nose     GERD (gastroesophageal reflux disease)      Heart burn     Hyperlipemia     Hypertension     Indigestion      Past Surgical History:   Procedure Laterality Date    RECOVERY  7/1/2015    Procedure:  CATH LAB  PM INSERT [facility] ICD9: 426.13;  Surgeon: Recoveryonly Surgery;  Location: SURGERY PRE-POST PROC UNIT [facility];  Service:     PACEMAKER INSERTION  July 2015    [facility] #[id number] implanted by Dr. Sanchez.    APPENDECTOMY      INGUINAL HERNIA REPAIR BILATERAL      PILONIDAL CYST EXCISION       Family History   Problem Relation Age of Onset    Heart Disease Mother     Diabetes Mother     Cancer Father 86        Pancreatitic     Social History     Socioeconomic History    Marital status:      Spouse name: Not on file    Number of children: Not on file    Years of education: Not on file    Highest education level: Not on file   Occupational History    Not on file   Tobacco Use    Smoking status: Never    Smokeless tobacco: Former    Tobacco comments:     8-10 years, but quit 35 years ago   Vaping Use    Vaping Use: Not on file   Substance and Sexual Activity    Alcohol use: Yes     Alcohol/week: 1.0 oz     Types: 1  Glasses of wine, 1 Cans of beer per week     Comment: LESS THAN WEEKLY    Drug use: No     Types: Inhaled     Comment: smoked MJ in college, none since then.    Sexual activity: Yes     Partners: Female, Male   Other Topics Concern    Not on file   Social History Narrative    Not on file     Social Determinants of Health     Financial Resource Strain: Not on file   Food Insecurity: Not on file   Transportation Needs: Not on file   Physical Activity: Not on file   Stress: Not on file   Social Connections: Not on file   Intimate Partner Violence: Not on file   Housing Stability: Not on file     No Known Allergies  Outpatient Encounter Medications as of 11/22/2022   Medication Sig Dispense Refill    rivaroxaban (XARELTO) 20 MG Tab tablet Take 1 Tablet by mouth with dinner. 30 Tablet 11    [DISCONTINUED] rivaroxaban (XARELTO) 20 MG Tab tablet Take 1 Tablet by mouth with dinner. 90 Tablet 11    simvastatin (ZOCOR) 40 MG Tab Take 1 Tablet by mouth every evening. 90 Tablet 3    lisinopril (PRINIVIL) 10 MG Tab Take 1 Tablet by mouth every evening. 90 Tablet 3    digoxin (LANOXIN) 125 MCG Tab Take 1 Tablet by mouth every evening. 90 Tablet 3    pantoprazole (PROTONIX) 40 MG Tablet Delayed Response Take 1 Tablet by mouth.      Calcium Carb-Cholecalciferol (CALCIUM + D3) 600-200 MG-UNIT TABS Take 1 Tab by mouth every evening.      Multiple Vitamins-Minerals (CENTRUM SILVER ULTRA MENS PO) Take 1 Tab by mouth every evening.      ascorbic acid (ASCORBIC ACID) 500 MG TABS Take 1 Tablet by mouth every evening.      [DISCONTINUED] Multiple Vitamins-Minerals (ZINC PO) Take  by mouth every day. (Patient not taking: Reported on 11/22/2022)      [DISCONTINUED] aspirin EC (ECOTRIN) 81 MG Tablet Delayed Response Take 1 Tablet by mouth every evening.       No facility-administered encounter medications on file as of 11/22/2022.     Review of Systems   Constitutional: Negative.  Negative for chills, fever and malaise/fatigue.   HENT:  "Negative.  Negative for sore throat.    Eyes: Negative.    Respiratory: Negative.  Negative for cough, hemoptysis, sputum production, shortness of breath, wheezing and stridor.    Cardiovascular: Negative.  Negative for chest pain, palpitations, orthopnea, claudication, leg swelling and PND.   Gastrointestinal: Negative.    Genitourinary: Negative.    Musculoskeletal: Negative.    Skin: Negative.    Neurological: Negative.  Negative for dizziness, loss of consciousness and weakness.   Endo/Heme/Allergies: Negative.  Does not bruise/bleed easily.   All other systems reviewed and are negative.     Objective:   /87   Pulse 76   Resp 18   Ht 1.753 m (5' 9\")   Wt 91.1 kg (200 lb 12.8 oz)   SpO2 95%   BMI 29.65 kg/m²     Physical Exam  Vitals and nursing note reviewed.   Constitutional:       General: He is not in acute distress.     Appearance: He is well-developed. He is not diaphoretic.   HENT:      Head: Normocephalic and atraumatic.      Right Ear: External ear normal.      Left Ear: External ear normal.      Nose: Nose normal.      Mouth/Throat:      Pharynx: No oropharyngeal exudate.   Eyes:      General: No scleral icterus.        Right eye: No discharge.         Left eye: No discharge.      Conjunctiva/sclera: Conjunctivae normal.      Pupils: Pupils are equal, round, and reactive to light.   Neck:      Vascular: No JVD.   Cardiovascular:      Rate and Rhythm: Normal rate and regular rhythm.      Heart sounds: No murmur heard.    No friction rub. No gallop.   Pulmonary:      Effort: Pulmonary effort is normal. No respiratory distress.      Breath sounds: No stridor. No wheezing or rales.   Chest:      Chest wall: No tenderness.   Abdominal:      General: There is no distension.      Palpations: Abdomen is soft.      Tenderness: There is no guarding.   Musculoskeletal:         General: No tenderness or deformity. Normal range of motion.      Cervical back: Neck supple.   Skin:     General: Skin is warm " and dry.      Coloration: Skin is not pale.      Findings: No erythema or rash.   Neurological:      Mental Status: He is alert.      Cranial Nerves: No cranial nerve deficit.      Motor: No abnormal muscle tone.      Coordination: Coordination normal.      Deep Tendon Reflexes: Reflexes are normal and symmetric. Reflexes normal.   Psychiatric:         Behavior: Behavior normal.         Thought Content: Thought content normal.         Judgment: Judgment normal.       Assessment:     1. Paroxysmal atrial fibrillation (HCC)  rivaroxaban (XARELTO) 20 MG Tab tablet    DISCONTINUED: rivaroxaban (XARELTO) 20 MG Tab tablet      2. Cardiac pacemaker in situ  DISCONTINUED: rivaroxaban (XARELTO) 20 MG Tab tablet      3. Second degree AV block        4. AVNRT (AV hitesh re-entry tachycardia) (HCC)        5. Pure hypercholesterolemia        6. Primary hypertension        7. Pre-diabetes            Medical Decision Making:  Today's Assessment / Status / Plan:     71-year-old male with hypertension hyperlipidemia pacemaker.  I explained to him the risk benefits alternatives of starting oral by coagulation.  I given a prescription for Xarelto.  He will start on this.  I will see him back in 6 months.  I will get a copy of his labs from [facility].

## 2022-12-23 ENCOUNTER — PATIENT MESSAGE (OUTPATIENT)
Dept: CARDIOLOGY | Facility: MEDICAL CENTER | Age: 72
End: 2022-12-23
Payer: MEDICARE

## 2022-12-23 ENCOUNTER — TELEPHONE (OUTPATIENT)
Dept: CARDIOLOGY | Facility: MEDICAL CENTER | Age: 72
End: 2022-12-23
Payer: MEDICARE

## 2022-12-23 NOTE — TELEPHONE ENCOUNTER
Pt sent Inside msg--out of Xarelto. S/W pt, offered Rx to be sent to [facility] on [address] for samples. Pt already talked to his [facility] pharmacy and will get a 2 week supply to hold him until mail order Rx arrives. Pt needs nothing further.

## 2022-12-25 ENCOUNTER — HOSPITAL ENCOUNTER (EMERGENCY)
Facility: MEDICAL CENTER | Age: 72
End: 2022-12-25
Attending: EMERGENCY MEDICINE
Payer: MEDICARE

## 2022-12-25 VITALS
RESPIRATION RATE: 22 BRPM | HEART RATE: 90 BPM | BODY MASS INDEX: 29.36 KG/M2 | SYSTOLIC BLOOD PRESSURE: 138 MMHG | DIASTOLIC BLOOD PRESSURE: 90 MMHG | OXYGEN SATURATION: 98 % | WEIGHT: 198.19 LBS | TEMPERATURE: 97.2 F | HEIGHT: 69 IN

## 2022-12-25 DIAGNOSIS — R04.0 EPISTAXIS: ICD-10-CM

## 2022-12-25 PROCEDURE — 99283 EMERGENCY DEPT VISIT LOW MDM: CPT

## 2022-12-25 PROCEDURE — 303620 HCHG EPISTAXIS CONTROL

## 2022-12-25 PROCEDURE — 700101 HCHG RX REV CODE 250: Performed by: EMERGENCY MEDICINE

## 2022-12-25 RX ORDER — TRANEXAMIC ACID 100 MG/ML
3 INJECTION, SOLUTION INTRAVENOUS ONCE
Status: COMPLETED | OUTPATIENT
Start: 2022-12-25 | End: 2022-12-25

## 2022-12-25 RX ORDER — AMOXICILLIN 500 MG/1
500 CAPSULE ORAL 3 TIMES DAILY
Qty: 15 CAPSULE | Refills: 0 | Status: SHIPPED | OUTPATIENT
Start: 2022-12-25 | End: 2022-12-30

## 2022-12-25 RX ADMIN — TRANEXAMIC ACID 300 MG: 100 INJECTION, SOLUTION INTRAVENOUS at 06:55

## 2022-12-25 NOTE — DISCHARGE INSTRUCTIONS
The packing in your nose should be removed in 5 days.  Call Dr. Schuster's office on Tuesday to make an appointment.  If you cannot get in he can also return to the urgent care or your primary care doctor or here for removal. You must be on antibiotics while the packing is in place.  Return of bleeding apply pressure.  It is important to stay on your anticoagulation to prevent strokes.

## 2022-12-25 NOTE — ED PROVIDER NOTES
ED Provider Note  CHIEF COMPLAINT  Chief Complaint   Patient presents with    Nose Bleed     Pt arrives to the ER via POV with wife. Pt complains of nose bleed with clots. Hx of blood thinners and nose bleed in the past. Nasal clamp placed. Onset PTA about 40 minutes ago.  Advised NPO until seen by provider. Pt verbalizes understanding.           HPI  Emil Reid is a 72 y.o. male who presents with a nosebleed.  Patient has had nosebleeds in the past.  He is currently taking Xarelto for atrial fibrillation.  No known trauma last night.  Started bleeding about an hour ago.  Denies headache.  No easy bruising.  No bleeding gums.  Bleeding is from the left naris.  No vomiting.  No chest pain or shortness of breath.  No dizziness.    REVIEW OF SYSTEMS  Positive for nosebleed, Negative for dizziness, weakness, chest pain, shortness of breath, nose trauma, bleeding gums, easy bruising.    PAST MEDICAL HISTORY   has a past medical history of AV block (07/01/2015), AVNRT (AV hitesh re-entry tachycardia) ([facility]), Bleeding from the nose, GERD (gastroesophageal reflux disease) ( ), Heart burn, Hyperlipemia, Hypertension, and Indigestion.    SOCIAL HISTORY  Social History     Tobacco Use    Smoking status: Never    Smokeless tobacco: Former    Tobacco comments:     8-10 years, but quit 35 years ago   Vaping Use    Vaping Use: Not on file   Substance and Sexual Activity    Alcohol use: Yes     Alcohol/week: 1.0 oz     Types: 1 Glasses of wine, 1 Cans of beer per week     Comment: LESS THAN WEEKLY    Drug use: No     Types: Inhaled     Comment: smoked MJ in college, none since then.    Sexual activity: Yes     Partners: Female, Male       SURGICAL HISTORY   has a past surgical history that includes appendectomy; inguinal hernia repair bilateral; pilonidal cyst excision; recovery (7/1/2015); and pacemaker insertion (July 2015).    CURRENT MEDICATIONS  Reviewed.  See Encounter Summary.      ALLERGIES  No Known  "Allergies    PHYSICAL EXAM  VITAL SIGNS: BP (!) 138/90   Pulse 90   Temp 36.2 °C (97.2 °F) (Temporal)   Resp (!) 22   Ht 1.753 m (5' 9\")   Wt 89.9 kg (198 lb 3.1 oz)   SpO2 98%   BMI 29.27 kg/m²   Constitutional:  Alert in no apparent distress.  HENT: Normocephalic, Bilateral external ears normal.  Blood in bilateral nares but active bleeding from the left nares.  Some mild blood in the posterior pharynx.  Unable to visualize the exact site of the bleeding.  Eyes: Pupils are equal and reactive. Conjunctiva normal, non-icteric.   Thorax & Lungs: Easy unlabored respirations, CTA   Cardiovascular: bradycardic  Abdomen:  No gross signs of peritonitis, no pain with movement   Skin: Visualized skin is  Dry, No erythema, No rash.   Extremities:   No edema, No asymmetry  Neurologic: Alert, Grossly non-focal.   Psychiatric: Affect and Mood normal      COURSE & MEDICAL DECISION MAKING  Nursing notes and vital signs were reviewed. (See chart for details)    The patient presents to the Emergency Department with nosebleed.  Bleeding from the left naris.  On anticoagulation for A. fib.  Discussed with patient he needs to continue the anticoagulation.  Unable to visualize the precise location of the bleed.  Will place TXA.  Will reevaluate after it sits for few minutes.  Patient is mildly bradycardic on initial presentation.  Currently his heart rates in the 90s.  He is asymptomatic.    TXA resolved the bleeding.  However still was not able to visualize the exact area that was bleeding.  We discussed different treatment options.  We elected to place a Merocel packing.  This has been placed.  He has been monitored for 30 minutes without return of bleeding.  Therefore I think he stable for outpatient management.  He will be placed on amoxicillin while he has the packing in place.  He will follow-up with ENT.  Bleeding precautions and return precautions were given.  Also understands to continue his anticoagulation.    Patient " discharged in stable condition.     The patient verbally agreed to the discharge precautions and follow-up plan which is documented in EPIC.    FINAL IMPRESSION  1. Epistaxis  amoxicillin (AMOXIL) 500 MG Cap

## 2022-12-25 NOTE — ED TRIAGE NOTES
Chief Complaint   Patient presents with    Nose Bleed     Pt arrives to the ER via POV with wife. Pt complains of nose bleed with clots. Hx of blood thinners and nose bleed in the past. Nasal clamp placed. Onset PTA about 40 minutes ago.  Advised NPO until seen by provider. Pt verbalizes understanding.

## 2022-12-25 NOTE — ED NOTES
D/c pt home, rx given . Pt aware of f/u instructions with ENT, aware to return for any changes or concerns. No further questions upon d/c home from ed

## 2022-12-28 VITALS
RESPIRATION RATE: 18 BRPM | DIASTOLIC BLOOD PRESSURE: 87 MMHG | BODY MASS INDEX: 29.74 KG/M2 | HEIGHT: 69 IN | OXYGEN SATURATION: 95 % | HEART RATE: 76 BPM | SYSTOLIC BLOOD PRESSURE: 139 MMHG | WEIGHT: 200.8 LBS

## 2023-01-24 ENCOUNTER — TELEPHONE (OUTPATIENT)
Dept: CARDIOLOGY | Facility: MEDICAL CENTER | Age: 73
End: 2023-01-24
Payer: MEDICARE

## 2023-01-24 NOTE — TELEPHONE ENCOUNTER
MISSY        Caller: Lucy      Office Name, phone number, fax number: [facility], # [phone], fax#[phone]    Fax clearance to [phone]    Procedure Name: Prostate Biopsy    Procedure Scheduled Date: 1/31/23    Callback Number: [phone]        Thank you    -Nile TRACY

## 2023-01-26 ENCOUNTER — TELEPHONE (OUTPATIENT)
Dept: RADIOLOGY | Facility: MEDICAL CENTER | Age: 73
End: 2023-01-26
Payer: MEDICARE

## 2023-01-30 ENCOUNTER — HOSPITAL ENCOUNTER (OUTPATIENT)
Dept: RADIOLOGY | Facility: MEDICAL CENTER | Age: 73
End: 2023-01-30
Attending: OPHTHALMOLOGY
Payer: MEDICARE

## 2023-01-30 DIAGNOSIS — H53.2 DIPLOPIA: ICD-10-CM

## 2023-01-30 DIAGNOSIS — H49.12 FOURTH NERVE PALSY OF LEFT EYE: ICD-10-CM

## 2023-01-30 PROCEDURE — 70481 CT ORBIT/EAR/FOSSA W/DYE: CPT

## 2023-01-30 PROCEDURE — 700117 HCHG RX CONTRAST REV CODE 255: Performed by: OPHTHALMOLOGY

## 2023-01-30 PROCEDURE — 70470 CT HEAD/BRAIN W/O & W/DYE: CPT

## 2023-01-30 RX ADMIN — IOHEXOL 50 ML: 350 INJECTION, SOLUTION INTRAVENOUS at 09:17

## 2023-01-30 RX ADMIN — IOHEXOL 50 ML: 350 INJECTION, SOLUTION INTRAVENOUS at 09:18

## 2023-01-30 NOTE — TELEPHONE ENCOUNTER
RO    Caller: [place]    Office Name, phone number, fax number:     [facility]  P: [phone]  F: [phone]    Fax clearance to [phone]    Procedure Name: PROSTATE BIOPSY    Procedure Scheduled Date: 1/31/23 @ 1045    Callback Number: [phone]

## 2023-01-31 NOTE — TELEPHONE ENCOUNTER
RN received telephone recommendation for procedure with [facility] per RO.     Patient is moderate risk for procedure. May proceed with procedure. May hold Xarelto for 24-48 hours prior to procedure and resume upon appropriate recommendations.     Information faxed electronically to F: [phone]

## 2023-01-31 NOTE — TELEPHONE ENCOUNTER
MISSY    Caller: [place] with [facility]     Topic/issue: Washington is calling to request that we resend the clearance to a new fax number. Please advise.     Fax: [phone]    Callback Number: [phone] [facility]     Thank you,   Geetha RESENDEZ

## 2023-03-11 ENCOUNTER — HOSPITAL ENCOUNTER (OUTPATIENT)
Dept: LAB | Facility: MEDICAL CENTER | Age: 73
End: 2023-03-11
Attending: FAMILY MEDICINE
Payer: MEDICARE

## 2023-03-11 LAB
ALBUMIN SERPL BCP-MCNC: 4.5 G/DL (ref 3.2–4.9)
ALBUMIN/GLOB SERPL: 1.5 G/DL
ALP SERPL-CCNC: 73 U/L (ref 30–99)
ALT SERPL-CCNC: 27 U/L (ref 2–50)
ANION GAP SERPL CALC-SCNC: 15 MMOL/L (ref 7–16)
AST SERPL-CCNC: 23 U/L (ref 12–45)
BASOPHILS # BLD AUTO: 0.7 % (ref 0–1.8)
BASOPHILS # BLD: 0.06 K/UL (ref 0–0.12)
BILIRUB SERPL-MCNC: 0.9 MG/DL (ref 0.1–1.5)
BUN SERPL-MCNC: 24 MG/DL (ref 8–22)
CALCIUM ALBUM COR SERPL-MCNC: 9.5 MG/DL (ref 8.5–10.5)
CALCIUM SERPL-MCNC: 9.9 MG/DL (ref 8.5–10.5)
CHLORIDE SERPL-SCNC: 105 MMOL/L (ref 96–112)
CHOLEST SERPL-MCNC: 175 MG/DL (ref 100–199)
CO2 SERPL-SCNC: 20 MMOL/L (ref 20–33)
CREAT SERPL-MCNC: 0.83 MG/DL (ref 0.5–1.4)
EOSINOPHIL # BLD AUTO: 0.15 K/UL (ref 0–0.51)
EOSINOPHIL NFR BLD: 1.8 % (ref 0–6.9)
ERYTHROCYTE [DISTWIDTH] IN BLOOD BY AUTOMATED COUNT: 46.3 FL (ref 35.9–50)
EST. AVERAGE GLUCOSE BLD GHB EST-MCNC: 117 MG/DL
FASTING STATUS PATIENT QL REPORTED: NORMAL
GFR SERPLBLD CREATININE-BSD FMLA CKD-EPI: 91 ML/MIN/1.73 M 2
GLOBULIN SER CALC-MCNC: 3.1 G/DL (ref 1.9–3.5)
GLUCOSE SERPL-MCNC: 104 MG/DL (ref 65–99)
HBA1C MFR BLD: 5.7 % (ref 4–5.6)
HCT VFR BLD AUTO: 53.1 % (ref 42–52)
HDLC SERPL-MCNC: 50 MG/DL
HGB BLD-MCNC: 17.7 G/DL (ref 14–18)
IMM GRANULOCYTES # BLD AUTO: 0.02 K/UL (ref 0–0.11)
IMM GRANULOCYTES NFR BLD AUTO: 0.2 % (ref 0–0.9)
LDLC SERPL CALC-MCNC: 99 MG/DL
LYMPHOCYTES # BLD AUTO: 1.83 K/UL (ref 1–4.8)
LYMPHOCYTES NFR BLD: 22.2 % (ref 22–41)
MCH RBC QN AUTO: 32.1 PG (ref 27–33)
MCHC RBC AUTO-ENTMCNC: 33.3 G/DL (ref 33.7–35.3)
MCV RBC AUTO: 96.4 FL (ref 81.4–97.8)
MONOCYTES # BLD AUTO: 0.74 K/UL (ref 0–0.85)
MONOCYTES NFR BLD AUTO: 9 % (ref 0–13.4)
NEUTROPHILS # BLD AUTO: 5.46 K/UL (ref 1.82–7.42)
NEUTROPHILS NFR BLD: 66.1 % (ref 44–72)
NRBC # BLD AUTO: 0 K/UL
NRBC BLD-RTO: 0 /100 WBC
PLATELET # BLD AUTO: 200 K/UL (ref 164–446)
PMV BLD AUTO: 10.9 FL (ref 9–12.9)
POTASSIUM SERPL-SCNC: 4.2 MMOL/L (ref 3.6–5.5)
PROT SERPL-MCNC: 7.6 G/DL (ref 6–8.2)
RBC # BLD AUTO: 5.51 M/UL (ref 4.7–6.1)
SODIUM SERPL-SCNC: 140 MMOL/L (ref 135–145)
TRIGL SERPL-MCNC: 131 MG/DL (ref 0–149)
TSH SERPL DL<=0.005 MIU/L-ACNC: 1.44 UIU/ML (ref 0.38–5.33)
WBC # BLD AUTO: 8.3 K/UL (ref 4.8–10.8)

## 2023-03-11 PROCEDURE — 85025 COMPLETE CBC W/AUTO DIFF WBC: CPT

## 2023-03-11 PROCEDURE — 84443 ASSAY THYROID STIM HORMONE: CPT

## 2023-03-11 PROCEDURE — 36415 COLL VENOUS BLD VENIPUNCTURE: CPT | Mod: GA

## 2023-03-11 PROCEDURE — 80061 LIPID PANEL: CPT

## 2023-03-11 PROCEDURE — 80053 COMPREHEN METABOLIC PANEL: CPT

## 2023-03-11 PROCEDURE — 83036 HEMOGLOBIN GLYCOSYLATED A1C: CPT | Mod: GA

## 2023-04-21 ENCOUNTER — TELEPHONE (OUTPATIENT)
Dept: CARDIOLOGY | Facility: MEDICAL CENTER | Age: 73
End: 2023-04-21
Payer: MEDICARE

## 2023-04-21 NOTE — TELEPHONE ENCOUNTER
Last OV: 11/22/22  Proposed Surgery: Radical  Prostatectomy   Surgery Date: 4/27/2023  Requesting Office Name: Cristine Lama   Fax Number: [phone]  Preference of Location (default is surgery center unless specified by Cardiologist or JAE)  Prior Clearance Addressed: No      Anticoags/Antiplatelets: Xarelto  Outstanding Cardiac Imaging : No  Stent, Cardiac Devices, or Catheterization: Yes  Date : 7/01/2015   Ablation, TAVR, Cardioversion: No  Recent Cardiac Hospitalization: Yes  Date:  2/20/2018            When: Greater than 6 months since hospitalization.   History (cardiac history):   Past Medical History:   Diagnosis Date    AV block 07/01/2015    Status post PPM implantation.    AVNRT (AV hitesh re-entry tachycardia) (HCC)     Bleeding from the nose     GERD (gastroesophageal reflux disease)      Heart burn     Hyperlipemia     Hypertension     Indigestion              Surgical Clearance Letter Sent: YES   **Scan clearance request letter into [facility].**

## 2023-04-21 NOTE — TELEPHONE ENCOUNTER
MISSY    Caller: Sumi Ortiz [facility]     Office Name, phone number, fax number:     [facility]   Fax- [phone]  Phone - [phone]    Fax clearance to [phone]  Was re faxed 4/21/23    Procedure Name: Radical  Prostatectomy   Procedure Scheduled Date: 4/27/23    Callback Number:  [phone]    Thank you   Franchesca BISHOP

## 2023-05-01 ENCOUNTER — APPOINTMENT (OUTPATIENT)
Dept: CARDIOLOGY | Facility: MEDICAL CENTER | Age: 73
End: 2023-05-01
Payer: MEDICARE

## 2023-05-19 ENCOUNTER — TELEPHONE (OUTPATIENT)
Dept: CARDIOLOGY | Facility: MEDICAL CENTER | Age: 73
End: 2023-05-19
Payer: MEDICARE

## 2023-05-19 DIAGNOSIS — I47.19 AVNRT (AV NODAL RE-ENTRY TACHYCARDIA) (HCC): ICD-10-CM

## 2023-05-19 RX ORDER — DIGOXIN 125 MCG
125 TABLET ORAL EVERY EVENING
Qty: 90 TABLET | Refills: 1 | Status: CANCELLED | OUTPATIENT
Start: 2023-05-19

## 2023-05-19 NOTE — TELEPHONE ENCOUNTER
MISSY     Caller: Emil Reid    Medication Name and Dosage:  digoxin (LANOXIN) 125 MCG Tab [[id number]    Please call your pharmacy and have them send us a refill request or speak to a live representative, RX number may have changed.    Medication amount left: 7 Days worth     Preferred Pharmacy: [facility] on file    Other questions (Topic): Patient states he is leaving town on 5/20/23 for 7 days and request the prescription be sent to his pharmacy so he can  the medication when he returns. Patient has upcoming appointment with  on 6/5/23. Please advise.     Callback Number (Will only call for issues): [phone] (home)     Thank you,   Geetha RESENDEZ

## 2023-05-19 NOTE — TELEPHONE ENCOUNTER
Is the patient due for a refill? Yes    Was the patient seen the past year? Yes    Date of last office visit: 11/22/2022    Does the patient have an upcoming appointment?  Yes   If yes, When? 6/5/2023    Provider to refill:MARIA E, ADD for RO    Does the patients insurance require a 100 day supply?  No

## 2023-05-22 RX ORDER — DIGOXIN 125 MCG
125 TABLET ORAL EVERY EVENING
Qty: 90 TABLET | Refills: 0 | Status: SHIPPED | OUTPATIENT
Start: 2023-05-22 | End: 2023-06-05

## 2023-06-05 ENCOUNTER — TELEPHONE (OUTPATIENT)
Dept: CARDIOLOGY | Facility: MEDICAL CENTER | Age: 73
End: 2023-06-05

## 2023-06-05 ENCOUNTER — NON-PROVIDER VISIT (OUTPATIENT)
Dept: CARDIOLOGY | Facility: MEDICAL CENTER | Age: 73
End: 2023-06-05
Attending: INTERNAL MEDICINE
Payer: MEDICARE

## 2023-06-05 VITALS
DIASTOLIC BLOOD PRESSURE: 72 MMHG | RESPIRATION RATE: 18 BRPM | WEIGHT: 193 LBS | HEART RATE: 70 BPM | SYSTOLIC BLOOD PRESSURE: 114 MMHG | BODY MASS INDEX: 28.58 KG/M2 | OXYGEN SATURATION: 95 % | HEIGHT: 69 IN

## 2023-06-05 VITALS
SYSTOLIC BLOOD PRESSURE: 114 MMHG | RESPIRATION RATE: 18 BRPM | WEIGHT: 193 LBS | DIASTOLIC BLOOD PRESSURE: 72 MMHG | OXYGEN SATURATION: 95 % | HEART RATE: 70 BPM | HEIGHT: 69 IN | BODY MASS INDEX: 28.58 KG/M2

## 2023-06-05 DIAGNOSIS — I47.19 AVNRT (AV NODAL RE-ENTRY TACHYCARDIA) (HCC): ICD-10-CM

## 2023-06-05 DIAGNOSIS — I48.0 PAROXYSMAL ATRIAL FIBRILLATION (HCC): ICD-10-CM

## 2023-06-05 DIAGNOSIS — I44.1 SECOND DEGREE AV BLOCK: ICD-10-CM

## 2023-06-05 DIAGNOSIS — Z95.0 CARDIAC PACEMAKER IN SITU: ICD-10-CM

## 2023-06-05 PROCEDURE — 93288 INTERROG EVL PM/LDLS PM IP: CPT | Performed by: NURSE PRACTITIONER

## 2023-06-05 PROCEDURE — 93280 PM DEVICE PROGR EVAL DUAL: CPT | Performed by: NURSE PRACTITIONER

## 2023-06-05 PROCEDURE — 3078F DIAST BP <80 MM HG: CPT | Performed by: INTERNAL MEDICINE

## 2023-06-05 PROCEDURE — 93288 INTERROG EVL PM/LDLS PM IP: CPT | Mod: 26 | Performed by: NURSE PRACTITIONER

## 2023-06-05 PROCEDURE — 99212 OFFICE O/P EST SF 10 MIN: CPT | Performed by: INTERNAL MEDICINE

## 2023-06-05 PROCEDURE — 99214 OFFICE O/P EST MOD 30 MIN: CPT | Mod: 25 | Performed by: INTERNAL MEDICINE

## 2023-06-05 PROCEDURE — 3074F SYST BP LT 130 MM HG: CPT | Performed by: INTERNAL MEDICINE

## 2023-06-05 RX ORDER — METOPROLOL SUCCINATE 25 MG/1
25 TABLET, EXTENDED RELEASE ORAL DAILY
Qty: 100 TABLET | Refills: 4 | Status: SHIPPED | OUTPATIENT
Start: 2023-06-05

## 2023-06-05 ASSESSMENT — ENCOUNTER SYMPTOMS
PALPITATIONS: 0
NEAR-SYNCOPE: 0
PND: 0
ORTHOPNEA: 0
RESPIRATORY NEGATIVE: 1
SYNCOPE: 0
DYSPNEA ON EXERTION: 0
HEMATOCHEZIA: 0

## 2023-06-05 ASSESSMENT — FIBROSIS 4 INDEX
FIB4 SCORE: 1.64
FIB4 SCORE: 1.64

## 2023-06-05 NOTE — PROGRESS NOTES
Device is working normally. Presenting rhythm is atrial fibrillation; 19 mode switching episodes (65% of total time); he is anticoagulated with Xarelto.  Normal sensing of RA and RV leads; stable capture of RV lead; stable impedances. Battery longevity is 8.4 months.  No changes are made today.    He will be scheduled for a PM generator change, as he will be out of town August through January 2024.    Per discussion with Dr. Lopez, to stop Digoxin and replace with Toprol XL. Consider DCCV after generator change if he has symptomatic (and still in AFib).    Follow-up with me 1 week following procedure.

## 2023-06-05 NOTE — TELEPHONE ENCOUNTER
Dr. Winslow,    I'm going to scheduled this gen change with you. This patient is taking Xarelto. Would you like him to hold the Xarelto for this procedure or continue taking it?    Thank You,  Tanvi

## 2023-06-05 NOTE — PROGRESS NOTES
Cardiology Follow Up Note    Date of note: 6/5/2023    Primary Care Provider: Edmond Flower M.D.    Patient Name: Emil Reid  YOB: 1950  MRN:              [id number]    Reason for Followup: 6 months follow-up, former patient of Dr. Miguel    History of Present Illness: Mr. Emil Reid is a 72 y.o. male whose current medical problems include paroxysmal atrial fibrillation on Xarelto, hypertension, hyperlipidemia, history of AVNRT ablatrion, pacemaker placement, who is here for cardiac for follow up.  Patient reports this is routine follow-up for him.  He has no symptoms.  He is traveling a lot and he can be gone for months.  He is still active, plays golf 5 times a week, still skis, more recently has not been able to because he had prostate surgery.    Based on review of notes from Dr. Ellington, her note reports she had second-degree AV block status post pacemaker, also had AVNRT and was started on digoxin for this.    Cardiovascular Risk Factors:  1. Smoking status:   Tobacco Use: Medium Risk (6/5/2023)    Patient History     Smoking Tobacco Use: Never     Smokeless Tobacco Use: Former     Passive Exposure: Not on file     2. Type II Diabetes Mellitus:   Lab Results   Component Value Date/Time    HBA1C 5.7 (H) 03/11/2023 07:41 AM    HBA1C 5.5 03/20/2020 10:15 AM     3. Hypertension: Yes on lisinopril 10 mg p.o. daily  4. Dyslipidemia: Yes on simvastatin 40 mg p.o. daily  Cholesterol,Tot   Date Value Ref Range Status   03/11/2023 175 100 - 199 mg/dL Final     LDL   Date Value Ref Range Status   03/11/2023 99 <100 mg/dL Final     HDL   Date Value Ref Range Status   03/11/2023 50 >=40 mg/dL Final     Triglycerides   Date Value Ref Range Status   03/11/2023 131 0 - 149 mg/dL Final       No problems updated.     Past Surgical History:   Procedure Laterality Date    RECOVERY  7/1/2015    Procedure:  CATH LAB  PM INSERT [facility] ICD9: 426.13;  Surgeon: Recoveryonly Surgery;  Location:  "SURGERY PRE-POST PROC UNIT [facility] [place];  Service:     PACEMAKER INSERTION  July 2015    [facility] #[id number] implanted by Dr. Sanchez.    APPENDECTOMY      INGUINAL HERNIA REPAIR BILATERAL      PILONIDAL CYST EXCISION          Current Outpatient Medications   Medication Sig Dispense Refill    metoprolol SR (TOPROL XL) 25 MG TABLET SR 24 HR Take 1 Tablet by mouth every day. 100 Tablet 4    rivaroxaban (XARELTO) 20 MG Tab tablet Take 1 Tablet by mouth with dinner. 30 Tablet 11    simvastatin (ZOCOR) 40 MG Tab Take 1 Tablet by mouth every evening. 90 Tablet 3    lisinopril (PRINIVIL) 10 MG Tab Take 1 Tablet by mouth every evening. 90 Tablet 3    pantoprazole (PROTONIX) 40 MG Tablet Delayed Response Take 1 Tablet by mouth.      Calcium Carb-Cholecalciferol (CALCIUM + D3) 600-200 MG-UNIT TABS Take 1 Tab by mouth every evening.      Multiple Vitamins-Minerals (CENTRUM SILVER ULTRA MENS PO) Take 1 Tab by mouth every evening.      ascorbic acid (ASCORBIC ACID) 500 MG TABS Take 1 Tablet by mouth every evening.       No current facility-administered medications for this visit.       No Known Allergies    Review of Systems   Cardiovascular:  Negative for chest pain, dyspnea on exertion, leg swelling, near-syncope, orthopnea, palpitations, paroxysmal nocturnal dyspnea and syncope.   Respiratory: Negative.     Gastrointestinal:  Negative for hematochezia and melena.       Physical Exam:  Ambulatory Vitals  /72 (BP Location: Left arm, Patient Position: Sitting, BP Cuff Size: Adult)   Pulse 70   Resp 18   Ht 1.753 m (5' 9\")   Wt 87.5 kg (193 lb)   SpO2 95%    Oxygen Therapy:  Pulse Oximetry: 95 %  BP Readings from Last 4 Encounters:   06/05/23 114/72   06/05/23 114/72   12/25/22 (!) 138/90   12/28/22 139/87       Weight/BMI:   Body mass index is 28.5 kg/m².  Wt Readings from Last 2 Encounters:   06/05/23 87.5 kg (193 lb)   06/05/23 87.5 kg (193 lb)       Physical Exam  Constitutional:       Appearance: Normal " appearance.   Neck:      Vascular: No JVD.   Cardiovascular:      Rate and Rhythm: Normal rate and regular rhythm.      Pulses: Normal pulses and intact distal pulses.      Heart sounds: Normal heart sounds, S1 normal and S2 normal. No murmur heard.     No friction rub. No S3 or S4 sounds.   Pulmonary:      Effort: Pulmonary effort is normal. No respiratory distress.      Breath sounds: Normal breath sounds. No wheezing or rales.   Musculoskeletal:      Cervical back: Neck supple.   Skin:     General: Skin is warm and dry.   Neurological:      Mental Status: He is alert.          Lab Data Review:  Lab Results   Component Value Date/Time    SODIUM 141 04/28/2023 05:09 AM    SODIUM 140 03/11/2023 07:41 AM    POTASSIUM 4.2 04/28/2023 05:09 AM    POTASSIUM 4.2 03/11/2023 07:41 AM    CHLORIDE 110 04/28/2023 05:09 AM    CHLORIDE 105 03/11/2023 07:41 AM    CO2 23.0 04/28/2023 05:09 AM    CO2 20 03/11/2023 07:41 AM    GLUCOSE 162 (H) 04/28/2023 05:09 AM    GLUCOSE 104 (H) 03/11/2023 07:41 AM    BUN 20.0 04/28/2023 05:09 AM    BUN 24 (H) 03/11/2023 07:41 AM    CREATININE 1.0 04/28/2023 05:09 AM    CREATININE 0.83 03/11/2023 07:41 AM    BUNCREATRAT 20.0 04/28/2023 05:09 AM    BUNCREATRAT 25 (H) 02/05/2015 07:58 AM     Lab Results   Component Value Date/Time    ALKPHOSPHAT 73 03/11/2023 07:41 AM    ASTSGOT 23 03/11/2023 07:41 AM    ALTSGPT 27 03/11/2023 07:41 AM    TBILIRUBIN 0.9 03/11/2023 07:41 AM      Lab Results   Component Value Date/Time    WBC 8.3 03/11/2023 07:41 AM     Lab Results   Component Value Date/Time    TSHULTRASEN 1.440 03/11/2023 07:41 AM         Cardiac Imaging and Procedures Review:    Last ECG dated 2/20/2018 is atrial sensed ventricular paced, 62 bpm    Echo dated 2/21/2018: My personal interpretation is normal left ventricular systolic function.  Upper limits of normal right ventricular cavity size with preserved systolic function.  Mild right atrial enlargement.  Trace tricuspid regurgitation.  RV  systolic pressure estimated at 30 mm Hg.    Nuclear Perfusion Imaging dated 4/23/2015:   No evidence of significant jeopardized viable myocardium or prior myocardial infarction with normal LVEF estimated at 77%.    Assessment & Plan     1.  Paroxysmal atrial fibrillation for which he is on digoxin and Xarelto.  Interrogation of pacemaker today suggest sees currently in atrial fibrillation ventricularly paced at 70 bpm (mode switches on).  Patient is asymptomatic.  - Discontinue digoxin  - Change to metoprolol succinate 25 mg p.o. daily  - Hold off on cardioversion at this time as patient is asymptomatic.  The patient needs to have his generator changed out which should be done first.  Thereafter he is going to travel.  He should then follow-up with Radha to determine how symptomatic determination of rate versus rhythm control can be made at that time.  And also can be referred to electrophysiology.  Will defer that to the follow-up appointment time.    2.  Continue with Xarelto for stroke prevention from atrial fibrillation.    3.  Presence of pacemaker for second-degree AV block.  Pacemaker was interrogated today.  Please see KATIE Martinez's note.  Battery is nearing CHEMA and patient will be scheduled to have battery change out soon.    Shared Medical Decision Making:  All of patient's questions were answered to the best of my knowledge and to patient's satisfaction. It was a pleasure seeing Mr. Emil Reid in my clinic today. Return in about 6 months (around 12/5/2023). Patient is aware to call the cardiology clinic with any questions or concerns.  As patient will need pacemaker generator change out, the follow-up with Radha Logan will probably be moved up.    Agnieszka Lopez MD  [facility]  [address]  [place] [address]  Phone:  [phone]  Fax:  [phone]    Please note that this dictation was created using voice recognition software. I have made every  reasonable attempt to correct obvious errors, but it is possible there are errors of grammar and possibly content that I did not discover before finalizing the note.

## 2023-06-05 NOTE — TELEPHONE ENCOUNTER
----- Message from PAULA Martinez sent at 6/5/2023  3:12 PM PDT -----  Regarding: PM Gen change  Tanvi,    Please call Ed to schedule PM gen change - [place] January 2024, so I'd like to get this done before he leaves.    Order is in Epic - he knows to expect your call.    Thank you!  Radha

## 2023-06-06 ENCOUNTER — APPOINTMENT (OUTPATIENT)
Dept: ADMISSIONS | Facility: MEDICAL CENTER | Age: 73
End: 2023-06-06
Attending: NEUROLOGICAL SURGERY
Payer: MEDICARE

## 2023-06-09 ENCOUNTER — APPOINTMENT (OUTPATIENT)
Dept: ADMISSIONS | Facility: MEDICAL CENTER | Age: 73
End: 2023-06-09
Attending: INTERNAL MEDICINE
Payer: MEDICARE

## 2023-06-09 NOTE — TELEPHONE ENCOUNTER
Patient scheduled for PM gen change on 6-26-23 with Dr. Winslow. Patient has been instructed to check in at 6:00 for 7:30 case time. Message sent to authorragini Han with [facility] notified.

## 2023-06-22 ENCOUNTER — PRE-ADMISSION TESTING (OUTPATIENT)
Dept: ADMISSIONS | Facility: MEDICAL CENTER | Age: 73
End: 2023-06-22
Attending: INTERNAL MEDICINE
Payer: MEDICARE

## 2023-06-23 ENCOUNTER — PRE-ADMISSION TESTING (OUTPATIENT)
Dept: ADMISSIONS | Facility: MEDICAL CENTER | Age: 73
End: 2023-06-23
Attending: INTERNAL MEDICINE
Payer: MEDICARE

## 2023-06-23 DIAGNOSIS — Z01.810 PRE-OPERATIVE CARDIOVASCULAR EXAMINATION: ICD-10-CM

## 2023-06-23 DIAGNOSIS — Z01.811 PRE-OPERATIVE RESPIRATORY EXAMINATION: ICD-10-CM

## 2023-06-23 DIAGNOSIS — Z01.812 PRE-OPERATIVE LABORATORY EXAMINATION: ICD-10-CM

## 2023-06-23 LAB
ALBUMIN SERPL BCP-MCNC: 4.5 G/DL (ref 3.2–4.9)
ALBUMIN/GLOB SERPL: 1.7 G/DL
ALP SERPL-CCNC: 74 U/L (ref 30–99)
ALT SERPL-CCNC: 20 U/L (ref 2–50)
ANION GAP SERPL CALC-SCNC: 13 MMOL/L (ref 7–16)
AST SERPL-CCNC: 16 U/L (ref 12–45)
BILIRUB SERPL-MCNC: 0.7 MG/DL (ref 0.1–1.5)
BUN SERPL-MCNC: 24 MG/DL (ref 8–22)
CALCIUM ALBUM COR SERPL-MCNC: 9 MG/DL (ref 8.5–10.5)
CALCIUM SERPL-MCNC: 9.4 MG/DL (ref 8.5–10.5)
CHLORIDE SERPL-SCNC: 108 MMOL/L (ref 96–112)
CO2 SERPL-SCNC: 22 MMOL/L (ref 20–33)
CREAT SERPL-MCNC: 0.9 MG/DL (ref 0.5–1.4)
EKG IMPRESSION: NORMAL
ERYTHROCYTE [DISTWIDTH] IN BLOOD BY AUTOMATED COUNT: 45.4 FL (ref 35.9–50)
GFR SERPLBLD CREATININE-BSD FMLA CKD-EPI: 89 ML/MIN/1.73 M 2
GLOBULIN SER CALC-MCNC: 2.7 G/DL (ref 1.9–3.5)
GLUCOSE SERPL-MCNC: 95 MG/DL (ref 65–99)
HCT VFR BLD AUTO: 47 % (ref 42–52)
HGB BLD-MCNC: 15.8 G/DL (ref 14–18)
INR PPP: 1.31 (ref 0.87–1.13)
MCH RBC QN AUTO: 32.2 PG (ref 27–33)
MCHC RBC AUTO-ENTMCNC: 33.6 G/DL (ref 32.3–36.5)
MCV RBC AUTO: 95.7 FL (ref 81.4–97.8)
PLATELET # BLD AUTO: 252 K/UL (ref 164–446)
PMV BLD AUTO: 9.4 FL (ref 9–12.9)
POTASSIUM SERPL-SCNC: 4 MMOL/L (ref 3.6–5.5)
PROT SERPL-MCNC: 7.2 G/DL (ref 6–8.2)
PROTHROMBIN TIME: 16.1 SEC (ref 12–14.6)
RBC # BLD AUTO: 4.91 M/UL (ref 4.7–6.1)
SODIUM SERPL-SCNC: 143 MMOL/L (ref 135–145)
WBC # BLD AUTO: 7.8 K/UL (ref 4.8–10.8)

## 2023-06-23 PROCEDURE — 93010 ELECTROCARDIOGRAM REPORT: CPT | Performed by: INTERNAL MEDICINE

## 2023-06-23 PROCEDURE — 80053 COMPREHEN METABOLIC PANEL: CPT

## 2023-06-23 PROCEDURE — 85027 COMPLETE CBC AUTOMATED: CPT

## 2023-06-23 PROCEDURE — 85610 PROTHROMBIN TIME: CPT

## 2023-06-23 PROCEDURE — 93005 ELECTROCARDIOGRAM TRACING: CPT

## 2023-06-23 PROCEDURE — 36415 COLL VENOUS BLD VENIPUNCTURE: CPT

## 2023-06-26 ENCOUNTER — HOSPITAL ENCOUNTER (OUTPATIENT)
Facility: MEDICAL CENTER | Age: 73
End: 2023-06-26
Attending: INTERNAL MEDICINE | Admitting: INTERNAL MEDICINE
Payer: MEDICARE

## 2023-06-26 ENCOUNTER — TELEPHONE (OUTPATIENT)
Dept: CARDIOLOGY | Facility: MEDICAL CENTER | Age: 73
End: 2023-06-26

## 2023-06-26 ENCOUNTER — APPOINTMENT (OUTPATIENT)
Dept: CARDIOLOGY | Facility: MEDICAL CENTER | Age: 73
End: 2023-06-26
Attending: NURSE PRACTITIONER
Payer: MEDICARE

## 2023-06-26 VITALS
RESPIRATION RATE: 13 BRPM | BODY MASS INDEX: 28.7 KG/M2 | DIASTOLIC BLOOD PRESSURE: 84 MMHG | SYSTOLIC BLOOD PRESSURE: 130 MMHG | TEMPERATURE: 97.2 F | WEIGHT: 193.78 LBS | OXYGEN SATURATION: 94 % | HEIGHT: 69 IN | HEART RATE: 70 BPM

## 2023-06-26 DIAGNOSIS — Z45.010 PACEMAKER AT END OF BATTERY LIFE: ICD-10-CM

## 2023-06-26 DIAGNOSIS — Z95.0 CARDIAC PACEMAKER IN SITU: ICD-10-CM

## 2023-06-26 DIAGNOSIS — I48.0 PAROXYSMAL ATRIAL FIBRILLATION (HCC): ICD-10-CM

## 2023-06-26 DIAGNOSIS — I44.1 SECOND DEGREE AV BLOCK: ICD-10-CM

## 2023-06-26 LAB — EKG IMPRESSION: NORMAL

## 2023-06-26 PROCEDURE — 160035 HCHG PACU - 1ST 60 MINS PHASE I

## 2023-06-26 PROCEDURE — 99152 MOD SED SAME PHYS/QHP 5/>YRS: CPT | Performed by: INTERNAL MEDICINE

## 2023-06-26 PROCEDURE — 93010 ELECTROCARDIOGRAM REPORT: CPT | Mod: 59 | Performed by: INTERNAL MEDICINE

## 2023-06-26 PROCEDURE — 160046 HCHG PACU - 1ST 60 MINS PHASE II

## 2023-06-26 PROCEDURE — 99153 MOD SED SAME PHYS/QHP EA: CPT

## 2023-06-26 PROCEDURE — 700101 HCHG RX REV CODE 250

## 2023-06-26 PROCEDURE — 33228 REMV&REPLC PM GEN DUAL LEAD: CPT | Performed by: INTERNAL MEDICINE

## 2023-06-26 PROCEDURE — 93005 ELECTROCARDIOGRAM TRACING: CPT | Performed by: INTERNAL MEDICINE

## 2023-06-26 PROCEDURE — 700111 HCHG RX REV CODE 636 W/ 250 OVERRIDE (IP)

## 2023-06-26 PROCEDURE — 160002 HCHG RECOVERY MINUTES (STAT)

## 2023-06-26 RX ORDER — ACETAMINOPHEN 325 MG/1
650 TABLET ORAL EVERY 4 HOURS PRN
Status: DISCONTINUED | OUTPATIENT
Start: 2023-06-26 | End: 2023-06-26 | Stop reason: HOSPADM

## 2023-06-26 RX ORDER — MIDAZOLAM HYDROCHLORIDE 1 MG/ML
INJECTION INTRAMUSCULAR; INTRAVENOUS
Status: COMPLETED
Start: 2023-06-26 | End: 2023-06-26

## 2023-06-26 RX ORDER — LIDOCAINE HYDROCHLORIDE 20 MG/ML
INJECTION, SOLUTION INFILTRATION; PERINEURAL
Status: COMPLETED
Start: 2023-06-26 | End: 2023-06-26

## 2023-06-26 RX ORDER — CEFAZOLIN SODIUM 1 G/3ML
INJECTION, POWDER, FOR SOLUTION INTRAMUSCULAR; INTRAVENOUS
Status: COMPLETED
Start: 2023-06-26 | End: 2023-06-26

## 2023-06-26 RX ORDER — CEPHALEXIN 500 MG/1
500 CAPSULE ORAL 2 TIMES DAILY
Qty: 10 CAPSULE | Refills: 0 | Status: SHIPPED | OUTPATIENT
Start: 2023-06-26 | End: 2023-07-25

## 2023-06-26 RX ORDER — BUPIVACAINE HYDROCHLORIDE 5 MG/ML
INJECTION, SOLUTION EPIDURAL; INTRACAUDAL
Status: COMPLETED
Start: 2023-06-26 | End: 2023-06-26

## 2023-06-26 RX ADMIN — LIDOCAINE HYDROCHLORIDE: 20 INJECTION, SOLUTION INFILTRATION; PERINEURAL at 07:43

## 2023-06-26 RX ADMIN — BUPIVACAINE HYDROCHLORIDE: 5 INJECTION, SOLUTION EPIDURAL; INTRACAUDAL at 07:43

## 2023-06-26 RX ADMIN — MIDAZOLAM HYDROCHLORIDE 2 MG: 2 INJECTION, SOLUTION INTRAMUSCULAR; INTRAVENOUS at 07:49

## 2023-06-26 RX ADMIN — FENTANYL CITRATE 100 MCG: 50 INJECTION, SOLUTION INTRAMUSCULAR; INTRAVENOUS at 08:00

## 2023-06-26 RX ADMIN — CEFAZOLIN 3000 MG: 1 INJECTION, POWDER, FOR SOLUTION INTRAMUSCULAR; INTRAVENOUS at 07:43

## 2023-06-26 ASSESSMENT — FIBROSIS 4 INDEX: FIB4 SCORE: 1.06

## 2023-06-26 NOTE — OR NURSING
@0909 Pt arrived to Phase 2. Pt has no complaints of pain or nausea. Dressing site is clean dry and intact. PT mobilized from gurney to chair. Vital signs stable. Pts spouse updated on patients status in discharge area.     @0993 Discharge instructions discussed with patient at patients bedside. PT verbalizes understanding. PIV removed. PT able to dress self without assistance.     @0974 Pt discharged to home with all belongings.

## 2023-06-26 NOTE — TELEPHONE ENCOUNTER
----- Message from Alda Winslow M.D. sent at 6/26/2023  8:46 AM PDT -----  Can we schedule appt for him to see me about his af next available.    Alda

## 2023-06-26 NOTE — TELEPHONE ENCOUNTER
Patient scheduled to see Dr. Winslow on 7-19-23. This appt will print out on his discharge paperwork

## 2023-06-26 NOTE — DISCHARGE INSTRUCTIONS
HOME CARE INSTRUCTIONS    ACTIVITY: Rest and take it easy for the first 24 hours.  A responsible adult is recommended to remain with you during that time.  It is normal to feel sleepy.  We encourage you to not do anything that requires balance, judgment or coordination.    FOR 24 HOURS DO NOT:  Drive, operate machinery or run household appliances.  Drink beer or alcoholic beverages.  Make important decisions or sign legal documents.    SPECIAL INSTRUCTIONS: Device Generator Change Discharge Instructions/[facility]    1.  No showers until seen in follow up; may take sponge bath.  Keep dressing dry & in place until seen at for you follow up visit at the cardiology office.     2.  Call our office ([phone]) if you notice any increased swelling, redness, warmth, or drainage at the implant site.  3.  Needs to be seen in emergency if you develop fever > 101F or uncontrolled pain.  4.  Always check with device clinic before any planned MRI to see if device is MRI compatible.  5. Do not place cell phones or mobile devices directly over implanted device.     DIET: To avoid nausea, slowly advance diet as tolerated, avoiding spicy or greasy foods for the first day.  Add more substantial food to your diet according to your physician's instructions.  Babies can be fed formula or breast milk as soon as they are hungry.  INCREASE FLUIDS AND FIBER TO AVOID CONSTIPATION.    SURGICAL DRESSING/BATHING: Keep dressing clean and dry until follow up in 1 week    MEDICATIONS: Resume taking daily medication.  Take prescribed pain medication with food.  If no medication is prescribed, you may take non-aspirin pain medication if needed.  PAIN MEDICATION CAN BE VERY CONSTIPATING.  Take a stool softener or laxative such as senokot, pericolace, or milk of magnesia if needed.    Prescription given for Keflex.  Last pain medication given at No oral pain medications given during recovery.    A follow-up appointment should be arranged  with your doctor in 1 Week; call to schedule.    You should CALL YOUR PHYSICIAN if you develop:  Fever greater than 101 degrees F.  Pain not relieved by medication, or persistent nausea or vomiting.  Excessive bleeding (blood soaking through dressing) or unexpected drainage from the wound.  Extreme redness or swelling around the incision site, drainage of pus or foul smelling drainage.  Inability to urinate or empty your bladder within 8 hours.  Problems with breathing or chest pain.    You should call 911 if you develop problems with breathing or chest pain.  If you are unable to contact your doctor or surgical center, you should go to the nearest emergency room or urgent care center.      Physician's telephone #: [phone] Dr. Winslow    MILD FLU-LIKE SYMPTOMS ARE NORMAL.  YOU MAY EXPERIENCE GENERALIZED MUSCLE ACHES, THROAT IRRITATION, HEADACHE AND/OR SOME NAUSEA.    If any questions arise, call your doctor.  If your doctor is not available, please feel free to call the Surgical Center at [phone].  The Center is open Monday through Friday from 7AM to 7PM.      A registered nurse may call you a few days after your surgery to see how you are doing after your procedure.    You may also receive a survey in the mail within the next two weeks and we ask that you take a few moments to complete the survey and return it to us.  Our goal is to provide you with very good care and we value your comments.     Depression / Suicide Risk    As you are discharged from this [facility] facility, it is important to learn how to keep safe from harming yourself.    Recognize the warning signs:  Abrupt changes in personality, positive or negative- including increase in energy   Giving away possessions  Change in eating patterns- significant weight changes-  positive or negative  Change in sleeping patterns- unable to sleep or sleeping all the time   Unwillingness or inability to communicate  Depression  Unusual sadness,  discouragement and loneliness  Talk of wanting to die  Neglect of personal appearance   Rebelliousness- reckless behavior  Withdrawal from people/activities they love  Confusion- inability to concentrate     If you or a loved one observes any of these behaviors or has concerns about self-harm, here's what you can do:  Talk about it- your feelings and reasons for harming yourself  Remove any means that you might use to hurt yourself (examples: pills, rope, extension cords, firearm)  Get professional help from the community (Mental Health, Substance Abuse, psychological counseling)  Do not be alone:Call your Safe Contact- someone whom you trust who will be there for you.  Call your local CRISIS HOTLINE 172-7730 or 489-364-3405  Call your local Children's Mobile Crisis Response Team [place] (865) 568-3829 or www.[employer]  Call the toll free National Suicide Prevention Hotlines   National Suicide Prevention Lifeline 902-249-MZLJ (5147)  National Hope Line Network 800-SUICIDE (243-9880)    I acknowledge receipt and understanding of these Home Care instructions.

## 2023-06-26 NOTE — OR NURSING
0824- Pt arrives to PACU from OR on room air. Report received. Dressing to L chest CDI. Pt denies pain and nausea at this time.     0838- EKG at bedside.    0847- Pt wife Annalisa called and updated on pt status and POC.

## 2023-06-30 NOTE — OP REPORT
[facility]     PROCEDURE: Pacemaker generator change    : Alda Winslow M.D.    ASSISTANT: None    ANESTHESIA: Moderate sedation and local anesthesia (start time 0740, stop time 0803, total dose given 100 mcg IV fentanyl, 2 mg IV versed)    ESTIMATED BLOOD LOSS: 20 cc.    SPECIMENS: None.    COMPLICATIONS: None.    INDICATION(S):   PPM at CHEMA    DESCRIPTION OF PROCEDURE: After informed written consent, the patient was brought to the electrophysiology lab in the fasting, unsedated state. The patient was prepped and draped in the usual sterile fashion. The procedure was performed under moderate sedation with local anesthetic. A left infraclavicular incision was made with a scalpel along the old scar. Access to the device pocket was made using a combination of blunt dissection and electrocautery. The old generator and leads were freed from adhesions and the generator disconnected from the leads. The pocket was irrigated with antibiotic solution, and the new generator was connected to the leads and inserted back in the pocket. The wound was closed with three layers of absorbable sutures and covered with Steri-Strips. Following recovery from sedation, the patient was transferred to a monitored bed in good condition.    REMOVED DEVICE INFORMATION:  Pulse generator is a SJM model RR0129   Serial number [id number]      IMPLANTED DEVICE INFORMATION:    Pulse generator is a SJM model IZ6090   Serial number [id number]      LEAD INFORMATION:  1. Right atrial lead is a SJM model 1688TC/52, serial number [id number], P wave 0.7 millivolts, pacing impedance 430 Ohms  2. Right ventricular lead is a SJM model 1688TC/58, serial number [id number], R wave PACED millivolts, threshold 0.75 Volts at 0.5 milliseconds, pacing impedance 550 Ohms, implant date 6/1/2015    DEVICE PROGRAMMING:    Damon therapy: DDD       IMPRESSIONS:  1. Successful pacemaker generator change.    RECOMMENDATIONS:  1. Transfer to PACU.  2. Follow-up in  device clinic for wound check and device interrogation.

## 2023-07-03 ENCOUNTER — PATIENT MESSAGE (OUTPATIENT)
Dept: CARDIOLOGY | Facility: MEDICAL CENTER | Age: 73
End: 2023-07-03

## 2023-07-03 ENCOUNTER — NON-PROVIDER VISIT (OUTPATIENT)
Dept: CARDIOLOGY | Facility: MEDICAL CENTER | Age: 73
End: 2023-07-03
Attending: INTERNAL MEDICINE
Payer: MEDICARE

## 2023-07-03 ENCOUNTER — NON-PROVIDER VISIT (OUTPATIENT)
Dept: CARDIOLOGY | Facility: MEDICAL CENTER | Age: 73
End: 2023-07-03

## 2023-07-03 DIAGNOSIS — I47.19 AVNRT (AV NODAL RE-ENTRY TACHYCARDIA) (HCC): ICD-10-CM

## 2023-07-03 DIAGNOSIS — Z95.0 CARDIAC PACEMAKER IN SITU: ICD-10-CM

## 2023-07-03 DIAGNOSIS — I48.0 PAROXYSMAL ATRIAL FIBRILLATION (HCC): ICD-10-CM

## 2023-07-03 DIAGNOSIS — I44.1 SECOND DEGREE AV BLOCK: ICD-10-CM

## 2023-07-03 PROCEDURE — 93279 PRGRMG DEV EVAL PM/LDLS PM: CPT | Performed by: INTERNAL MEDICINE

## 2023-07-03 PROCEDURE — 93280 PM DEVICE PROGR EVAL DUAL: CPT | Mod: 26 | Performed by: INTERNAL MEDICINE

## 2023-07-03 NOTE — PATIENT COMMUNICATION
"Per [facility] office visit note 6/5/23,  \"And also can be referred to electrophysiology. Will defer that to the follow-up appointment time.\"  ===================    [facility]: Pt wanting to discuss additional treatments to  metoprolol.  Please advise if okay to place EP referral or if f/u with you needed first  "

## 2023-07-03 NOTE — CARDIAC REMOTE MONITOR - SCAN
Device transmission reviewed. Device demonstrated appropriate function.       Electronically Signed by: Alda Winslow M.D.    7/14/2023  8:38 AM

## 2023-07-25 ENCOUNTER — PRE-ADMISSION TESTING (OUTPATIENT)
Dept: ADMISSIONS | Facility: MEDICAL CENTER | Age: 73
End: 2023-07-25
Attending: OPHTHALMOLOGY
Payer: MEDICARE

## 2023-07-26 ENCOUNTER — APPOINTMENT (OUTPATIENT)
Dept: CARDIOLOGY | Facility: MEDICAL CENTER | Age: 73
End: 2023-07-26
Attending: INTERNAL MEDICINE
Payer: MEDICARE

## 2023-07-26 ENCOUNTER — TELEPHONE (OUTPATIENT)
Dept: CARDIOLOGY | Facility: MEDICAL CENTER | Age: 73
End: 2023-07-26

## 2023-07-26 VITALS
HEART RATE: 70 BPM | OXYGEN SATURATION: 96 % | HEIGHT: 69 IN | SYSTOLIC BLOOD PRESSURE: 126 MMHG | BODY MASS INDEX: 29.03 KG/M2 | DIASTOLIC BLOOD PRESSURE: 80 MMHG | RESPIRATION RATE: 14 BRPM | WEIGHT: 196 LBS

## 2023-07-26 DIAGNOSIS — I48.19 PERSISTENT ATRIAL FIBRILLATION (HCC): ICD-10-CM

## 2023-07-26 DIAGNOSIS — I44.1 SECOND DEGREE AV BLOCK: ICD-10-CM

## 2023-07-26 DIAGNOSIS — Z95.0 CARDIAC PACEMAKER IN SITU: ICD-10-CM

## 2023-07-26 DIAGNOSIS — I47.19 AVNRT (AV NODAL RE-ENTRY TACHYCARDIA) (HCC): ICD-10-CM

## 2023-07-26 PROCEDURE — 93005 ELECTROCARDIOGRAM TRACING: CPT | Performed by: INTERNAL MEDICINE

## 2023-07-26 PROCEDURE — 3079F DIAST BP 80-89 MM HG: CPT | Performed by: INTERNAL MEDICINE

## 2023-07-26 PROCEDURE — 93280 PM DEVICE PROGR EVAL DUAL: CPT | Mod: 26 | Performed by: INTERNAL MEDICINE

## 2023-07-26 PROCEDURE — 99215 OFFICE O/P EST HI 40 MIN: CPT | Mod: 25,24 | Performed by: INTERNAL MEDICINE

## 2023-07-26 PROCEDURE — 93280 PM DEVICE PROGR EVAL DUAL: CPT | Performed by: INTERNAL MEDICINE

## 2023-07-26 PROCEDURE — 3074F SYST BP LT 130 MM HG: CPT | Performed by: INTERNAL MEDICINE

## 2023-07-26 PROCEDURE — 99212 OFFICE O/P EST SF 10 MIN: CPT | Performed by: INTERNAL MEDICINE

## 2023-07-26 ASSESSMENT — FIBROSIS 4 INDEX: FIB4 SCORE: 1.06

## 2023-07-26 NOTE — PROGRESS NOTES
Arrhythmia Clinic Note (Established patient)    DOS: 7/26/2023    Chief complaint/Reason for consult: Persistent AF    Interval History:  Pt is a 74 yo M. 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 He has had low burden PAF and more recently earlier this year went into AF and stayed in persistent AF. No effort has been made for rhythm control just yet. He is at this point no longer earlier persistent. His rate is not an issue as he has AV block and is chronically V paced. Here to discuss rhythm control options.    ROS (+ highlighted in red):  General--Negative for fatigue, weight loss or weight gain  Cardiovascular--Negative for CP, orthopnea, PND    Past Medical History:   Diagnosis Date    AV block 07/01/2015    Status post PPM implantation.    AVNRT (AV hitesh re-entry tachycardia) (HCC)     Bleeding from the nose     Cancer (HCC)     prostate cancer    GERD (gastroesophageal reflux disease)      Heart burn     High cholesterol     Hyperlipemia     Hypertension     Indigestion     Pacemaker     Urinary incontinence        Past Surgical History:   Procedure Laterality Date    OTHER  04/2023    prostactectomy    RECOVERY  07/01/2015    Procedure:  CATH LAB  PM INSERT [facility] ICD9: 426.13;  Surgeon: Recoveryonly Surgery;  [place] [facility] [place]  [place]:     PACEMAKER INSERTION  07/01/2015    [facility] DR #[id number] implanted by Dr. Sanchez.    APPENDECTOMY      INGUINAL HERNIA REPAIR BILATERAL      PILONIDAL CYST EXCISION         Social History     Socioeconomic History    Marital status:      Spouse name: Not on file    Number of children: Not on file    Years of education: Not on file    Highest education level: Not on file   Occupational History    Not on file   Tobacco Use    Smoking status: Never    Smokeless tobacco: Never    Tobacco comments:     Chew was over 35 years ago   Vaping Use    Vaping Use: Never used   Substance and  "Sexual Activity    Alcohol use: Yes     Alcohol/week: 1.2 oz     Types: 1 Glasses of wine, 1 Cans of beer per week     Comment: LESS THAN WEEKLY    Drug use: No     Types: Inhaled     Comment: smoked MJ in college, none since then.    Sexual activity: Yes     Partners: Female, Male   Other Topics Concern    Not on file   Social History Narrative    Not on file     Social Determinants of Health     Financial Resource Strain: Not on file   Food Insecurity: Not on file   Transportation Needs: Not on file   Physical Activity: Not on file   Stress: Not on file   Social Connections: Not on file   Intimate Partner Violence: Not on file   Housing Stability: Not on file       Family History   Problem Relation Age of Onset    Heart Disease Mother     Diabetes Mother     Cancer Father 86        Pancreatitic       No Known Allergies    Current Outpatient Medications   Medication Sig Dispense Refill    metoprolol SR (TOPROL XL) 25 MG TABLET SR 24 HR Take 1 Tablet by mouth every day. 100 Tablet 4    rivaroxaban (XARELTO) 20 MG Tab tablet Take 1 Tablet by mouth with dinner. 30 Tablet 11    simvastatin (ZOCOR) 40 MG Tab Take 1 Tablet by mouth every evening. 90 Tablet 3    lisinopril (PRINIVIL) 10 MG Tab Take 1 Tablet by mouth every evening. 90 Tablet 3    pantoprazole (PROTONIX) 40 MG Tablet Delayed Response Take 40 mg by mouth every evening.      Calcium Carb-Cholecalciferol (CALCIUM + D3) 600-200 MG-UNIT TABS Take 1 Tab by mouth every evening.      Multiple Vitamins-Minerals (CENTRUM SILVER ULTRA MENS PO) Take 1 Tab by mouth every evening.      ascorbic acid (ASCORBIC ACID) 500 MG TABS Take 1 Tablet by mouth every evening.       No current facility-administered medications for this visit.       Physical Exam:  Vitals:    07/26/23 1402   BP: 126/80   BP Location: Left arm   Patient Position: Sitting   BP Cuff Size: Adult   Pulse: 70   Resp: 14   SpO2: 96%   Weight: 88.9 kg (196 lb)   Height: 1.753 m (5' 9\")     General appearance: " NAD, conversant  HEENT: PERRL, neck is supple with FROM  Lungs: Clear to auscultation, normal respiratory effort  CV: RRR, no murmurs/rubs/gallops, no JVD  Abdomen: Soft, non-tender with normal bowel sounds  Extremities: No peripheral edema, no clubbing or cyanosis  Skin: No rash, lesions, or ulcers  Psych: Alert and oriented to person, place and time    Data:  Labs reviewed    Prior echo/stress reviewed:  LVEF 60%    EKG interpreted by me:  AF, V paced    Impression/Plan:  1. Persistent atrial fibrillation (HCC)  EKG    CL-EP ABLATION ATRIAL FIBRILLATION    EC-ELENA W/ CONT        -He is not long standing persistent AF  -I discussed with him that long term persistent AF will overtime increase risk of CHF, stroke, dementia, CV death  -I also discussed that given that he is otherwise very healthy and active with reasonably ~>10-15 years life expectancy, certainly very reasonable to attempt some rhythm control before he becomes longstanding and his long term risks of sequelae increase and we are left with permanent fib  -Antiarrhythmics vs catheter ablation were discussed and he is interested in pursuing catheter ablation  -All questions answered, will schedule according to his travel schedule    Alda Winslow MD

## 2023-07-26 NOTE — TELEPHONE ENCOUNTER
Dr. Winslow,    For the afib ablation - are there any medications this patient needs to hold for this procedure?    Thank You,  Tanvi

## 2023-07-26 NOTE — TELEPHONE ENCOUNTER
Alda Winslow M.D.  Tanvi Nguyen, Med Ass't  Let's schedule AF ablation. No meds to hold. He's going on some sort of adventure to [facility] for undefined period of time but willing to come back for procedure.

## 2023-07-27 NOTE — TELEPHONE ENCOUNTER
Patient scheduled for afib ablation w/ELENA on 9-15-23 with Dr. Winslow. Patient has been instructed to check in at 6:00 for 8:30 case time. Message sent to authoralessandro. Emely with [facility] notified. Emailed Fartun.

## 2023-07-31 ENCOUNTER — APPOINTMENT (OUTPATIENT)
Dept: ADMISSIONS | Facility: MEDICAL CENTER | Age: 73
End: 2023-07-31
Attending: INTERNAL MEDICINE
Payer: MEDICARE

## 2023-08-03 ENCOUNTER — ANESTHESIA (OUTPATIENT)
Dept: SURGERY | Facility: MEDICAL CENTER | Age: 73
End: 2023-08-03
Payer: MEDICARE

## 2023-08-03 ENCOUNTER — ANESTHESIA EVENT (OUTPATIENT)
Dept: SURGERY | Facility: MEDICAL CENTER | Age: 73
End: 2023-08-03
Payer: MEDICARE

## 2023-08-03 ENCOUNTER — HOSPITAL ENCOUNTER (OUTPATIENT)
Facility: MEDICAL CENTER | Age: 73
End: 2023-08-03
Attending: OPHTHALMOLOGY | Admitting: OPHTHALMOLOGY
Payer: MEDICARE

## 2023-08-03 VITALS
SYSTOLIC BLOOD PRESSURE: 127 MMHG | WEIGHT: 190.7 LBS | HEIGHT: 69 IN | RESPIRATION RATE: 12 BRPM | BODY MASS INDEX: 28.24 KG/M2 | OXYGEN SATURATION: 92 % | DIASTOLIC BLOOD PRESSURE: 82 MMHG | HEART RATE: 71 BPM | TEMPERATURE: 97.4 F

## 2023-08-03 PROCEDURE — 700101 HCHG RX REV CODE 250: Performed by: OPHTHALMOLOGY

## 2023-08-03 PROCEDURE — 160035 HCHG PACU - 1ST 60 MINS PHASE I: Performed by: OPHTHALMOLOGY

## 2023-08-03 PROCEDURE — 160009 HCHG ANES TIME/MIN: Performed by: OPHTHALMOLOGY

## 2023-08-03 PROCEDURE — 700105 HCHG RX REV CODE 258: Mod: JZ | Performed by: ANESTHESIOLOGY

## 2023-08-03 PROCEDURE — 160048 HCHG OR STATISTICAL LEVEL 1-5: Performed by: OPHTHALMOLOGY

## 2023-08-03 PROCEDURE — 160041 HCHG SURGERY MINUTES - EA ADDL 1 MIN LEVEL 4: Performed by: OPHTHALMOLOGY

## 2023-08-03 PROCEDURE — 160002 HCHG RECOVERY MINUTES (STAT): Performed by: OPHTHALMOLOGY

## 2023-08-03 PROCEDURE — 160029 HCHG SURGERY MINUTES - 1ST 30 MINS LEVEL 4: Performed by: OPHTHALMOLOGY

## 2023-08-03 PROCEDURE — 700101 HCHG RX REV CODE 250: Performed by: ANESTHESIOLOGY

## 2023-08-03 PROCEDURE — 700101 HCHG RX REV CODE 250

## 2023-08-03 PROCEDURE — 160025 RECOVERY II MINUTES (STATS): Performed by: OPHTHALMOLOGY

## 2023-08-03 PROCEDURE — 700111 HCHG RX REV CODE 636 W/ 250 OVERRIDE (IP): Performed by: ANESTHESIOLOGY

## 2023-08-03 PROCEDURE — 160046 HCHG PACU - 1ST 60 MINS PHASE II: Performed by: OPHTHALMOLOGY

## 2023-08-03 RX ORDER — BALANCED SALT SOLUTION 6.4; .75; .48; .3; 3.9; 1.7 MG/ML; MG/ML; MG/ML; MG/ML; MG/ML; MG/ML
SOLUTION OPHTHALMIC
Status: DISCONTINUED
Start: 2023-08-03 | End: 2023-08-03 | Stop reason: HOSPADM

## 2023-08-03 RX ORDER — BALANCED SALT SOLUTION 6.4; .75; .48; .3; 3.9; 1.7 MG/ML; MG/ML; MG/ML; MG/ML; MG/ML; MG/ML
SOLUTION OPHTHALMIC
Status: DISCONTINUED | OUTPATIENT
Start: 2023-08-03 | End: 2023-08-03 | Stop reason: HOSPADM

## 2023-08-03 RX ORDER — SODIUM CHLORIDE, SODIUM LACTATE, POTASSIUM CHLORIDE, CALCIUM CHLORIDE 600; 310; 30; 20 MG/100ML; MG/100ML; MG/100ML; MG/100ML
INJECTION, SOLUTION INTRAVENOUS
Status: DISCONTINUED | OUTPATIENT
Start: 2023-08-03 | End: 2023-08-03 | Stop reason: SURG

## 2023-08-03 RX ORDER — PHENYLEPHRINE HYDROCHLORIDE 25 MG/ML
SOLUTION/ DROPS OPHTHALMIC
Status: DISCONTINUED | OUTPATIENT
Start: 2023-08-03 | End: 2023-08-03 | Stop reason: HOSPADM

## 2023-08-03 RX ORDER — ONDANSETRON 2 MG/ML
INJECTION INTRAMUSCULAR; INTRAVENOUS PRN
Status: DISCONTINUED | OUTPATIENT
Start: 2023-08-03 | End: 2023-08-03 | Stop reason: SURG

## 2023-08-03 RX ORDER — KETOROLAC TROMETHAMINE 30 MG/ML
INJECTION, SOLUTION INTRAMUSCULAR; INTRAVENOUS PRN
Status: DISCONTINUED | OUTPATIENT
Start: 2023-08-03 | End: 2023-08-03 | Stop reason: SURG

## 2023-08-03 RX ORDER — PHENYLEPHRINE HYDROCHLORIDE 25 MG/ML
1 SOLUTION/ DROPS OPHTHALMIC
Status: COMPLETED | OUTPATIENT
Start: 2023-08-03 | End: 2023-08-03

## 2023-08-03 RX ORDER — ONDANSETRON 2 MG/ML
4 INJECTION INTRAMUSCULAR; INTRAVENOUS
Status: DISCONTINUED | OUTPATIENT
Start: 2023-08-03 | End: 2023-08-03 | Stop reason: HOSPADM

## 2023-08-03 RX ORDER — DEXAMETHASONE SODIUM PHOSPHATE 4 MG/ML
INJECTION, SOLUTION INTRA-ARTICULAR; INTRALESIONAL; INTRAMUSCULAR; INTRAVENOUS; SOFT TISSUE PRN
Status: DISCONTINUED | OUTPATIENT
Start: 2023-08-03 | End: 2023-08-03 | Stop reason: SURG

## 2023-08-03 RX ORDER — TETRACAINE HYDROCHLORIDE 5 MG/ML
SOLUTION OPHTHALMIC
Status: DISCONTINUED
Start: 2023-08-03 | End: 2023-08-03 | Stop reason: HOSPADM

## 2023-08-03 RX ORDER — DIPHENHYDRAMINE HYDROCHLORIDE 50 MG/ML
12.5 INJECTION INTRAMUSCULAR; INTRAVENOUS
Status: DISCONTINUED | OUTPATIENT
Start: 2023-08-03 | End: 2023-08-03 | Stop reason: HOSPADM

## 2023-08-03 RX ORDER — SODIUM CHLORIDE, SODIUM LACTATE, POTASSIUM CHLORIDE, CALCIUM CHLORIDE 600; 310; 30; 20 MG/100ML; MG/100ML; MG/100ML; MG/100ML
INJECTION, SOLUTION INTRAVENOUS CONTINUOUS
Status: DISCONTINUED | OUTPATIENT
Start: 2023-08-03 | End: 2023-08-03 | Stop reason: HOSPADM

## 2023-08-03 RX ORDER — PHENYLEPHRINE HYDROCHLORIDE 25 MG/ML
SOLUTION/ DROPS OPHTHALMIC
Status: COMPLETED
Start: 2023-08-03 | End: 2023-08-03

## 2023-08-03 RX ORDER — HALOPERIDOL 5 MG/ML
1 INJECTION INTRAMUSCULAR
Status: DISCONTINUED | OUTPATIENT
Start: 2023-08-03 | End: 2023-08-03 | Stop reason: HOSPADM

## 2023-08-03 RX ORDER — PHENYLEPHRINE HYDROCHLORIDE 10 MG/ML
INJECTION, SOLUTION INTRAMUSCULAR; INTRAVENOUS; SUBCUTANEOUS PRN
Status: DISCONTINUED | OUTPATIENT
Start: 2023-08-03 | End: 2023-08-03 | Stop reason: SURG

## 2023-08-03 RX ADMIN — FENTANYL CITRATE 50 MCG: 50 INJECTION, SOLUTION INTRAMUSCULAR; INTRAVENOUS at 10:02

## 2023-08-03 RX ADMIN — PROPOFOL 200 MG: 10 INJECTION, EMULSION INTRAVENOUS at 09:58

## 2023-08-03 RX ADMIN — PHENYLEPHRINE HYDROCHLORIDE 100 MCG: 10 INJECTION INTRAVENOUS at 11:04

## 2023-08-03 RX ADMIN — KETOROLAC TROMETHAMINE 15 MG: 30 INJECTION, SOLUTION INTRAMUSCULAR; INTRAVENOUS at 11:05

## 2023-08-03 RX ADMIN — GLYCOPYRROLATE 0.2 MG: 0.2 INJECTION INTRAMUSCULAR; INTRAVENOUS at 10:12

## 2023-08-03 RX ADMIN — DEXAMETHASONE SODIUM PHOSPHATE 4 MG: 4 INJECTION INTRA-ARTICULAR; INTRALESIONAL; INTRAMUSCULAR; INTRAVENOUS; SOFT TISSUE at 10:10

## 2023-08-03 RX ADMIN — PHENYLEPHRINE HYDROCHLORIDE 100 MCG: 10 INJECTION INTRAVENOUS at 10:29

## 2023-08-03 RX ADMIN — ONDANSETRON 4 MG: 2 INJECTION INTRAMUSCULAR; INTRAVENOUS at 10:09

## 2023-08-03 RX ADMIN — SODIUM CHLORIDE, POTASSIUM CHLORIDE, SODIUM LACTATE AND CALCIUM CHLORIDE: 600; 310; 30; 20 INJECTION, SOLUTION INTRAVENOUS at 09:57

## 2023-08-03 RX ADMIN — PHENYLEPHRINE HYDROCHLORIDE 1 DROP: 25 SOLUTION/ DROPS OPHTHALMIC at 09:03

## 2023-08-03 RX ADMIN — FENTANYL CITRATE 50 MCG: 50 INJECTION, SOLUTION INTRAMUSCULAR; INTRAVENOUS at 11:07

## 2023-08-03 ASSESSMENT — PAIN DESCRIPTION - PAIN TYPE
TYPE: SURGICAL PAIN

## 2023-08-03 ASSESSMENT — PAIN SCALES - GENERAL: PAIN_LEVEL: 0

## 2023-08-03 ASSESSMENT — FIBROSIS 4 INDEX: FIB4 SCORE: 1.06

## 2023-08-03 NOTE — ANESTHESIA PROCEDURE NOTES
Airway    Date/Time: 8/3/2023 10:01 AM    Performed by: Baljeet Diehl M.D.  Authorized by: Baljeet Diehl M.D.    Location:  OR  Urgency:  Elective  Indications for Airway Management:  Anesthesia      Spontaneous Ventilation: absent    Sedation Level:  Deep  Preoxygenated: Yes    Final Airway Type:  Supraglottic airway  Final Supraglottic Airway:  Standard LMA    SGA Size:  4  Number of Attempts at Approach:  1

## 2023-08-03 NOTE — ANESTHESIA POSTPROCEDURE EVALUATION
Patient: Emil Reid    Procedure Summary     Date: 08/03/23 Room / Location: [facility] ROOM 27 / SURGERY SAME DAY [facility]    Anesthesia Start: 0957 Anesthesia Stop: 1117    Procedure: BILATERAL MEDIAL RECTUS RECESS, LEFT INFERIOR OBLIQUE TRANSPOSITON (Bilateral: Eye) Diagnosis: (ESOTROPIA, HYPERTROPIA)    Surgeons: Etelvina Barbour M.D. Responsible Provider: Baljeet Diehl M.D.    Anesthesia Type: general ASA Status: 3          Final Anesthesia Type: general  Last vitals  BP   Blood Pressure : (!) 139/96    Temp   36.2 °C (97.2 °F)    Pulse   65   Resp   18    SpO2   95 %      Anesthesia Post Evaluation    Patient location during evaluation: PACU  Patient participation: complete - patient participated  Level of consciousness: awake and alert  Pain score: 0    Airway patency: patent  Anesthetic complications: no  Cardiovascular status: adequate and hemodynamically stable  Respiratory status: acceptable  Hydration status: acceptable    PONV: none          No notable events documented.     Nurse Pain Score: 0 (NPRS)

## 2023-08-03 NOTE — DISCHARGE INSTRUCTIONS
If any questions arise, call your provider Dr. Barbour [phone].    Special instructions:   Use eye ointment (TobraDex) twice a day for five days.  Cool compresses as needed.  Tylenol for pain  Follow Up with Dr. Barbour.    If your provider is not available, please feel free to call the Surgical Center at [phone].    MEDICATIONS: Resume taking daily medication.  Take prescribed pain medication with food.  If no medication is prescribed, you may take non-aspirin pain medication if needed.  PAIN MEDICATION CAN BE VERY CONSTIPATING.  Take a stool softener or laxative such as senokot, pericolace, or milk of magnesia if needed.    Last pain medication given at     1105 ketorolac (Toradol) injection (like Ibuprofen / NSAIDS) next dose cn be given at 7pm tonight 08/03/2023      What to Expect Post Anesthesia    Rest and take it easy for the first 24 hours.  A responsible adult is recommended to remain with you during that time.  It is normal to feel sleepy.  We encourage you to not do anything that requires balance, judgment or coordination.    FOR 24 HOURS DO NOT:  Drive, operate machinery or run household appliances.  Drink beer or alcoholic beverages.  Make important decisions or sign legal documents.    To avoid nausea, slowly advance diet as tolerated, avoiding spicy or greasy foods for the first day.  Add more substantial food to your diet according to your provider's instructions.  Babies can be fed formula or breast milk as soon as they are hungry.  INCREASE FLUIDS AND FIBER TO AVOID CONSTIPATION.    MILD FLU-LIKE SYMPTOMS ARE NORMAL.  YOU MAY EXPERIENCE GENERALIZED MUSCLE ACHES, THROAT IRRITATION, HEADACHE AND/OR SOME NAUSEA.

## 2023-08-03 NOTE — ANESTHESIA PREPROCEDURE EVALUATION
Case: [id number] Date/Time: 08/03/23 0930    Procedure: BILATERAL MEDIAL RECTUS RECESS, LEFT SUPERIOR RECTUS RECESS-ADJUSTABLE SUTURES, LEFT SUPERIOR OBLIQUE TRANSPOSITON    Pre-op diagnosis: STRABISMUS, DIPLOPIA    Location: [facility] ROOM 27 / SURGERY SAME DAY [facility]    Surgeons: Etelvina Barbour M.D.          Relevant Problems   CARDIAC   (positive) AVNRT (AV hitesh re-entry tachycardia) ([facility])   (positive) Cardiac pacemaker in situ   (positive) Hypertension   (positive) Paroxysmal atrial fibrillation (HCC)   (positive) Second degree AV block      GI   (positive) GERD (gastroesophageal reflux disease)       Physical Exam    Airway   Mallampati: II  TM distance: >3 FB  Neck ROM: full       Cardiovascular - normal exam  Rhythm: regular  Rate: normal  (-) murmur     Dental - normal exam           Pulmonary - normal exam  Breath sounds clear to auscultation     Abdominal    Neurological - normal exam                 Anesthesia Plan    ASA 3       Plan - general       Airway plan will be LMA          Induction: intravenous    Postoperative Plan: Postoperative administration of opioids is intended.    Pertinent diagnostic labs and testing reviewed    Informed Consent:    Anesthetic plan and risks discussed with patient.    Use of blood products discussed with: patient whom consented to blood products.

## 2023-08-03 NOTE — ANESTHESIA TIME REPORT
Anesthesia Start and Stop Event Times     Date Time Event    8/3/2023 0922 Ready for Procedure     0957 Anesthesia Start     1117 Anesthesia Stop        Responsible Staff  08/03/23    Name Role Begin End    Baljeet Diehl M.D. Anesth 0957 1117        Overtime Reason:  no overtime (within assigned shift)    Comments:

## 2023-08-03 NOTE — OP REPORT
DATE OF SERVICE:  08/03/2023     SURGEON:  Etelvina Barbour MD     ASSISTANT:  None.     ANESTHESIA:  General.     ANESTHESIOLOGIST:  Wendie Calix MD     COMPLICATIONS:  None.     PREOPERATIVE DIAGNOSES:  1.  Esotropia.  2.  Left superior oblique palsy.     POSTOPERATIVE DIAGNOSES:  1.  Esotropia.  2.  Left superior oblique palsy.     PROCEDURES PERFORMED:  1.  Bilateral medial rectus recessions, 3.75 mm.  2.  Left inferior oblique transposition.     The risks, benefits, and alternatives to the procedure were discussed with the   patient and he elected to proceed.  The patient was brought to the operating   room suite in stable condition and inducted under general anesthesia without   complications.  Both eyes were prepped and draped in the usual sterile   ophthalmic fashion.  An inferonasal fornix incision was created to enter   conjunctiva and Tenon's capsule of the right eye.  A Beavers hook followed by   Kip hook was used to hook the medial rectus muscle.  Anterior Tenon's   capsule was sharply dissected against the muscle belly.  A 6-0 Vicryl   double-armed suture on S29 spatula needles was used to imbricate the muscle   belly at its insertion site using a locking bite at either edge of the muscle.    The muscle was disinserted from the globe, then placed 3.75 mm posterior to   the insertion site.  A 6-0 plain gut sutures were used to close Tenon's   capsule and conjunctiva.  The identical procedure was performed for the left   eye.  An inferotemporal fornix incision was created to enter conjunctiva and   Tenon's capsule of the left eye.  A Beavers hook followed by a Kip hook   was used to hook the lateral rectus muscle.  The inferior oblique muscle was   then well visualized and hooked first with a Beavers hook, then with a Tarpley   hook.  A 6-0 Vicryl double-armed suture on S28 spatula needles was used to   imbricate the muscle belly at its insertion site using a locking bite at   either edge of  the muscle.  The muscle was disinserted from the globe, then   placed 4 mm posterior and 2 mm lateral to the lateral border of the inferior   rectus insertion site.  A 6-0 plain gut sutures were used to close Tenon's   capsule and conjunctiva.  TobraDex ointment was placed on both eyes.  The   patient tolerated the procedure well.  There were no complications.        ______________________________  MD BRANDO LEON/ANUPAM/BRAULIO    DD:  08/03/2023 12:47  DT:  08/03/2023 13:10    Job#:  [id number]

## 2023-08-03 NOTE — OR NURSING
*This is a Running Note*    1117 Pt to PACU 02 from OR. Bedside report from anesthesiologist and RN.  Attached to monitoring, VSS, breathing is calm and unlabored, Patient is asleep currently. Remains on 6 L oxygen via mask with an Oral airway in place.      1128 Oral Airway out, Pt denies pain or nausea at this time.     1200 Pt Now on RA, and has had some water, tolerating well. Up to recliner, has small pain, states tolerable at the moment, Called Wife to update.    1215 Criteria met to transition patient to phase 2 recovery. Called wife, she is at home, about 20 mins away, will head down.    1240 Pt wife is at bedside, Pt will get dressed with her help.    1257 Pt stable to discharge.  Instructions given, patient and Wife verbalize understanding.  PIV removed with tip intact.  Taken via wheelchair to car.  Pt has all belongings with them.

## 2023-08-31 LAB — EKG IMPRESSION: NORMAL

## 2023-08-31 PROCEDURE — 93010 ELECTROCARDIOGRAM REPORT: CPT | Performed by: INTERNAL MEDICINE

## 2023-09-13 ENCOUNTER — PRE-ADMISSION TESTING (OUTPATIENT)
Dept: ADMISSIONS | Facility: MEDICAL CENTER | Age: 73
End: 2023-09-13
Attending: INTERNAL MEDICINE
Payer: MEDICARE

## 2023-09-14 ENCOUNTER — ANESTHESIA EVENT (OUTPATIENT)
Dept: CARDIOLOGY | Facility: MEDICAL CENTER | Age: 73
End: 2023-09-14
Payer: MEDICARE

## 2023-09-14 ENCOUNTER — PRE-ADMISSION TESTING (OUTPATIENT)
Dept: ADMISSIONS | Facility: MEDICAL CENTER | Age: 73
End: 2023-09-14
Attending: INTERNAL MEDICINE
Payer: MEDICARE

## 2023-09-14 DIAGNOSIS — Z01.812 PRE-OPERATIVE LABORATORY EXAMINATION: ICD-10-CM

## 2023-09-14 DIAGNOSIS — Z01.810 PRE-OPERATIVE CARDIOVASCULAR EXAMINATION: ICD-10-CM

## 2023-09-14 LAB
ALBUMIN SERPL BCP-MCNC: 4.5 G/DL (ref 3.2–4.9)
ALBUMIN/GLOB SERPL: 1.6 G/DL
ALP SERPL-CCNC: 83 U/L (ref 30–99)
ALT SERPL-CCNC: 25 U/L (ref 2–50)
ANION GAP SERPL CALC-SCNC: 12 MMOL/L (ref 7–16)
AST SERPL-CCNC: 23 U/L (ref 12–45)
BASOPHILS # BLD AUTO: 0.7 % (ref 0–1.8)
BASOPHILS # BLD: 0.05 K/UL (ref 0–0.12)
BILIRUB SERPL-MCNC: 1.1 MG/DL (ref 0.1–1.5)
BUN SERPL-MCNC: 23 MG/DL (ref 8–22)
CALCIUM ALBUM COR SERPL-MCNC: 8.8 MG/DL (ref 8.5–10.5)
CALCIUM SERPL-MCNC: 9.2 MG/DL (ref 8.5–10.5)
CHLORIDE SERPL-SCNC: 107 MMOL/L (ref 96–112)
CO2 SERPL-SCNC: 23 MMOL/L (ref 20–33)
CREAT SERPL-MCNC: 0.94 MG/DL (ref 0.5–1.4)
EKG IMPRESSION: NORMAL
EOSINOPHIL # BLD AUTO: 0.13 K/UL (ref 0–0.51)
EOSINOPHIL NFR BLD: 1.8 % (ref 0–6.9)
ERYTHROCYTE [DISTWIDTH] IN BLOOD BY AUTOMATED COUNT: 47.8 FL (ref 35.9–50)
GFR SERPLBLD CREATININE-BSD FMLA CKD-EPI: 84 ML/MIN/1.73 M 2
GLOBULIN SER CALC-MCNC: 2.8 G/DL (ref 1.9–3.5)
GLUCOSE SERPL-MCNC: 103 MG/DL (ref 65–99)
HCT VFR BLD AUTO: 46.1 % (ref 42–52)
HGB BLD-MCNC: 15.4 G/DL (ref 14–18)
IMM GRANULOCYTES # BLD AUTO: 0.02 K/UL (ref 0–0.11)
IMM GRANULOCYTES NFR BLD AUTO: 0.3 % (ref 0–0.9)
INR PPP: 1.75 (ref 0.87–1.13)
LYMPHOCYTES # BLD AUTO: 2.09 K/UL (ref 1–4.8)
LYMPHOCYTES NFR BLD: 29.4 % (ref 22–41)
MCH RBC QN AUTO: 31.8 PG (ref 27–33)
MCHC RBC AUTO-ENTMCNC: 33.4 G/DL (ref 32.3–36.5)
MCV RBC AUTO: 95.2 FL (ref 81.4–97.8)
MONOCYTES # BLD AUTO: 0.79 K/UL (ref 0–0.85)
MONOCYTES NFR BLD AUTO: 11.1 % (ref 0–13.4)
NEUTROPHILS # BLD AUTO: 4.04 K/UL (ref 1.82–7.42)
NEUTROPHILS NFR BLD: 56.7 % (ref 44–72)
NRBC # BLD AUTO: 0 K/UL
NRBC BLD-RTO: 0 /100 WBC (ref 0–0.2)
PLATELET # BLD AUTO: 199 K/UL (ref 164–446)
PMV BLD AUTO: 10.6 FL (ref 9–12.9)
POTASSIUM SERPL-SCNC: 3.8 MMOL/L (ref 3.6–5.5)
PROT SERPL-MCNC: 7.3 G/DL (ref 6–8.2)
PROTHROMBIN TIME: 20.7 SEC (ref 12–14.6)
RBC # BLD AUTO: 4.84 M/UL (ref 4.7–6.1)
SODIUM SERPL-SCNC: 142 MMOL/L (ref 135–145)
WBC # BLD AUTO: 7.1 K/UL (ref 4.8–10.8)

## 2023-09-14 PROCEDURE — 93005 ELECTROCARDIOGRAM TRACING: CPT

## 2023-09-14 PROCEDURE — 93010 ELECTROCARDIOGRAM REPORT: CPT | Performed by: STUDENT IN AN ORGANIZED HEALTH CARE EDUCATION/TRAINING PROGRAM

## 2023-09-14 PROCEDURE — 85610 PROTHROMBIN TIME: CPT

## 2023-09-14 PROCEDURE — 85025 COMPLETE CBC W/AUTO DIFF WBC: CPT

## 2023-09-14 PROCEDURE — 36415 COLL VENOUS BLD VENIPUNCTURE: CPT

## 2023-09-14 PROCEDURE — 80053 COMPREHEN METABOLIC PANEL: CPT

## 2023-09-15 ENCOUNTER — ANESTHESIA (OUTPATIENT)
Dept: CARDIOLOGY | Facility: MEDICAL CENTER | Age: 73
End: 2023-09-15
Payer: MEDICARE

## 2023-09-15 ENCOUNTER — HOSPITAL ENCOUNTER (OUTPATIENT)
Facility: MEDICAL CENTER | Age: 73
End: 2023-09-15
Attending: INTERNAL MEDICINE | Admitting: INTERNAL MEDICINE
Payer: MEDICARE

## 2023-09-15 ENCOUNTER — APPOINTMENT (OUTPATIENT)
Dept: CARDIOLOGY | Facility: MEDICAL CENTER | Age: 73
End: 2023-09-15
Attending: INTERNAL MEDICINE
Payer: MEDICARE

## 2023-09-15 VITALS
SYSTOLIC BLOOD PRESSURE: 153 MMHG | BODY MASS INDEX: 28.7 KG/M2 | OXYGEN SATURATION: 95 % | RESPIRATION RATE: 16 BRPM | DIASTOLIC BLOOD PRESSURE: 92 MMHG | HEIGHT: 69 IN | TEMPERATURE: 97.2 F | HEART RATE: 81 BPM | WEIGHT: 193.78 LBS

## 2023-09-15 DIAGNOSIS — I48.19 PERSISTENT ATRIAL FIBRILLATION (HCC): ICD-10-CM

## 2023-09-15 LAB
ACT BLD: 263 SEC (ref 74–137)
ACT BLD: 275 SEC (ref 74–137)
ACT BLD: 293 SEC (ref 74–137)
ACT BLD: 293 SEC (ref 74–137)
EKG IMPRESSION: NORMAL

## 2023-09-15 PROCEDURE — 160036 HCHG PACU - EA ADDL 30 MINS PHASE I

## 2023-09-15 PROCEDURE — 93312 ECHO TRANSESOPHAGEAL: CPT | Mod: 26 | Performed by: INTERNAL MEDICINE

## 2023-09-15 PROCEDURE — 160002 HCHG RECOVERY MINUTES (STAT)

## 2023-09-15 PROCEDURE — A9270 NON-COVERED ITEM OR SERVICE: HCPCS | Performed by: STUDENT IN AN ORGANIZED HEALTH CARE EDUCATION/TRAINING PROGRAM

## 2023-09-15 PROCEDURE — 85347 COAGULATION TIME ACTIVATED: CPT

## 2023-09-15 PROCEDURE — 93657 TX L/R ATRIAL FIB ADDL: CPT | Mod: 78 | Performed by: INTERNAL MEDICINE

## 2023-09-15 PROCEDURE — 93010 ELECTROCARDIOGRAM REPORT: CPT | Performed by: INTERNAL MEDICINE

## 2023-09-15 PROCEDURE — 700105 HCHG RX REV CODE 258: Performed by: INTERNAL MEDICINE

## 2023-09-15 PROCEDURE — 700111 HCHG RX REV CODE 636 W/ 250 OVERRIDE (IP)

## 2023-09-15 PROCEDURE — 700102 HCHG RX REV CODE 250 W/ 637 OVERRIDE(OP): Performed by: STUDENT IN AN ORGANIZED HEALTH CARE EDUCATION/TRAINING PROGRAM

## 2023-09-15 PROCEDURE — 160035 HCHG PACU - 1ST 60 MINS PHASE I

## 2023-09-15 PROCEDURE — 93325 DOPPLER ECHO COLOR FLOW MAPG: CPT

## 2023-09-15 PROCEDURE — C1730 CATH, EP, 19 OR FEW ELECT: HCPCS

## 2023-09-15 PROCEDURE — 93005 ELECTROCARDIOGRAM TRACING: CPT | Performed by: INTERNAL MEDICINE

## 2023-09-15 PROCEDURE — 700101 HCHG RX REV CODE 250: Performed by: STUDENT IN AN ORGANIZED HEALTH CARE EDUCATION/TRAINING PROGRAM

## 2023-09-15 PROCEDURE — 700111 HCHG RX REV CODE 636 W/ 250 OVERRIDE (IP): Performed by: STUDENT IN AN ORGANIZED HEALTH CARE EDUCATION/TRAINING PROGRAM

## 2023-09-15 PROCEDURE — 93656 COMPRE EP EVAL ABLTJ ATR FIB: CPT | Mod: 78 | Performed by: INTERNAL MEDICINE

## 2023-09-15 PROCEDURE — 160046 HCHG PACU - 1ST 60 MINS PHASE II

## 2023-09-15 PROCEDURE — 700101 HCHG RX REV CODE 250

## 2023-09-15 RX ORDER — LIDOCAINE HYDROCHLORIDE 20 MG/ML
INJECTION, SOLUTION EPIDURAL; INFILTRATION; INTRACAUDAL; PERINEURAL PRN
Status: DISCONTINUED | OUTPATIENT
Start: 2023-09-15 | End: 2023-09-15 | Stop reason: SURG

## 2023-09-15 RX ORDER — OXYCODONE HCL 5 MG/5 ML
10 SOLUTION, ORAL ORAL
Status: DISCONTINUED | OUTPATIENT
Start: 2023-09-15 | End: 2023-09-15 | Stop reason: HOSPADM

## 2023-09-15 RX ORDER — CEFAZOLIN SODIUM 1 G/3ML
INJECTION, POWDER, FOR SOLUTION INTRAMUSCULAR; INTRAVENOUS PRN
Status: DISCONTINUED | OUTPATIENT
Start: 2023-09-15 | End: 2023-09-15 | Stop reason: SURG

## 2023-09-15 RX ORDER — LIDOCAINE HYDROCHLORIDE 40 MG/ML
SOLUTION TOPICAL
Status: COMPLETED
Start: 2023-09-15 | End: 2023-09-15

## 2023-09-15 RX ORDER — HYDROMORPHONE HYDROCHLORIDE 1 MG/ML
0.1 INJECTION, SOLUTION INTRAMUSCULAR; INTRAVENOUS; SUBCUTANEOUS
Status: DISCONTINUED | OUTPATIENT
Start: 2023-09-15 | End: 2023-09-15 | Stop reason: HOSPADM

## 2023-09-15 RX ORDER — HEPARIN SODIUM 1000 [USP'U]/ML
INJECTION, SOLUTION INTRAVENOUS; SUBCUTANEOUS
Status: COMPLETED
Start: 2023-09-15 | End: 2023-09-15

## 2023-09-15 RX ORDER — SODIUM CHLORIDE 9 MG/ML
1000 INJECTION, SOLUTION INTRAVENOUS ONCE
Status: COMPLETED | OUTPATIENT
Start: 2023-09-15 | End: 2023-09-15

## 2023-09-15 RX ORDER — HYDROMORPHONE HYDROCHLORIDE 1 MG/ML
0.4 INJECTION, SOLUTION INTRAMUSCULAR; INTRAVENOUS; SUBCUTANEOUS
Status: DISCONTINUED | OUTPATIENT
Start: 2023-09-15 | End: 2023-09-15 | Stop reason: HOSPADM

## 2023-09-15 RX ORDER — MIDAZOLAM HYDROCHLORIDE 1 MG/ML
INJECTION INTRAMUSCULAR; INTRAVENOUS
Status: COMPLETED
Start: 2023-09-15 | End: 2023-09-15

## 2023-09-15 RX ORDER — OMEPRAZOLE 20 MG/1
20 CAPSULE, DELAYED RELEASE ORAL DAILY
Qty: 30 CAPSULE | Refills: 0 | Status: SHIPPED | OUTPATIENT
Start: 2023-09-15 | End: 2023-09-15

## 2023-09-15 RX ORDER — ONDANSETRON 2 MG/ML
4 INJECTION INTRAMUSCULAR; INTRAVENOUS
Status: DISCONTINUED | OUTPATIENT
Start: 2023-09-15 | End: 2023-09-15 | Stop reason: HOSPADM

## 2023-09-15 RX ORDER — DIPHENHYDRAMINE HYDROCHLORIDE 50 MG/ML
12.5 INJECTION INTRAMUSCULAR; INTRAVENOUS
Status: DISCONTINUED | OUTPATIENT
Start: 2023-09-15 | End: 2023-09-15 | Stop reason: HOSPADM

## 2023-09-15 RX ORDER — ONDANSETRON 2 MG/ML
INJECTION INTRAMUSCULAR; INTRAVENOUS PRN
Status: DISCONTINUED | OUTPATIENT
Start: 2023-09-15 | End: 2023-09-15 | Stop reason: SURG

## 2023-09-15 RX ORDER — PROTAMINE SULFATE 10 MG/ML
INJECTION, SOLUTION INTRAVENOUS
Status: COMPLETED
Start: 2023-09-15 | End: 2023-09-15

## 2023-09-15 RX ORDER — METOPROLOL TARTRATE 1 MG/ML
1 INJECTION, SOLUTION INTRAVENOUS
Status: DISCONTINUED | OUTPATIENT
Start: 2023-09-15 | End: 2023-09-15 | Stop reason: HOSPADM

## 2023-09-15 RX ORDER — ACETAMINOPHEN 500 MG
1000 TABLET ORAL ONCE
Status: DISCONTINUED | OUTPATIENT
Start: 2023-09-15 | End: 2023-09-15 | Stop reason: HOSPADM

## 2023-09-15 RX ORDER — LIDOCAINE HYDROCHLORIDE 40 MG/ML
SOLUTION TOPICAL PRN
Status: DISCONTINUED | OUTPATIENT
Start: 2023-09-15 | End: 2023-09-15 | Stop reason: SURG

## 2023-09-15 RX ORDER — SODIUM CHLORIDE, SODIUM LACTATE, POTASSIUM CHLORIDE, CALCIUM CHLORIDE 600; 310; 30; 20 MG/100ML; MG/100ML; MG/100ML; MG/100ML
INJECTION, SOLUTION INTRAVENOUS CONTINUOUS
Status: DISCONTINUED | OUTPATIENT
Start: 2023-09-15 | End: 2023-09-15 | Stop reason: HOSPADM

## 2023-09-15 RX ORDER — DEXAMETHASONE SODIUM PHOSPHATE 4 MG/ML
INJECTION, SOLUTION INTRA-ARTICULAR; INTRALESIONAL; INTRAMUSCULAR; INTRAVENOUS; SOFT TISSUE PRN
Status: DISCONTINUED | OUTPATIENT
Start: 2023-09-15 | End: 2023-09-15 | Stop reason: SURG

## 2023-09-15 RX ORDER — HYDRALAZINE HYDROCHLORIDE 20 MG/ML
5 INJECTION INTRAMUSCULAR; INTRAVENOUS
Status: DISCONTINUED | OUTPATIENT
Start: 2023-09-15 | End: 2023-09-15 | Stop reason: HOSPADM

## 2023-09-15 RX ORDER — HYDROMORPHONE HYDROCHLORIDE 1 MG/ML
0.2 INJECTION, SOLUTION INTRAMUSCULAR; INTRAVENOUS; SUBCUTANEOUS
Status: DISCONTINUED | OUTPATIENT
Start: 2023-09-15 | End: 2023-09-15 | Stop reason: HOSPADM

## 2023-09-15 RX ORDER — LIDOCAINE HYDROCHLORIDE 20 MG/ML
INJECTION, SOLUTION INFILTRATION; PERINEURAL
Status: COMPLETED
Start: 2023-09-15 | End: 2023-09-15

## 2023-09-15 RX ORDER — HALOPERIDOL 5 MG/ML
1 INJECTION INTRAMUSCULAR
Status: DISCONTINUED | OUTPATIENT
Start: 2023-09-15 | End: 2023-09-15 | Stop reason: HOSPADM

## 2023-09-15 RX ORDER — ONDANSETRON 2 MG/ML
4 INJECTION INTRAMUSCULAR; INTRAVENOUS EVERY 6 HOURS PRN
Status: DISCONTINUED | OUTPATIENT
Start: 2023-09-15 | End: 2023-09-15 | Stop reason: HOSPADM

## 2023-09-15 RX ORDER — HEPARIN SODIUM 200 [USP'U]/100ML
INJECTION, SOLUTION INTRAVENOUS
Status: COMPLETED
Start: 2023-09-15 | End: 2023-09-15

## 2023-09-15 RX ORDER — SUCRALFATE 1 G/1
1 TABLET ORAL
Qty: 56 TABLET | Refills: 0 | Status: SHIPPED | OUTPATIENT
Start: 2023-09-15 | End: 2023-09-29

## 2023-09-15 RX ORDER — EPHEDRINE SULFATE 50 MG/ML
5 INJECTION, SOLUTION INTRAVENOUS
Status: DISCONTINUED | OUTPATIENT
Start: 2023-09-15 | End: 2023-09-15 | Stop reason: HOSPADM

## 2023-09-15 RX ORDER — BUPIVACAINE HYDROCHLORIDE 5 MG/ML
INJECTION, SOLUTION EPIDURAL; INTRACAUDAL
Status: COMPLETED
Start: 2023-09-15 | End: 2023-09-15

## 2023-09-15 RX ORDER — LABETALOL HYDROCHLORIDE 5 MG/ML
5 INJECTION, SOLUTION INTRAVENOUS
Status: DISCONTINUED | OUTPATIENT
Start: 2023-09-15 | End: 2023-09-15 | Stop reason: HOSPADM

## 2023-09-15 RX ORDER — OXYCODONE HCL 5 MG/5 ML
5 SOLUTION, ORAL ORAL
Status: DISCONTINUED | OUTPATIENT
Start: 2023-09-15 | End: 2023-09-15 | Stop reason: HOSPADM

## 2023-09-15 RX ADMIN — ROCURONIUM BROMIDE 60 MG: 10 INJECTION, SOLUTION INTRAVENOUS at 08:35

## 2023-09-15 RX ADMIN — ROCURONIUM BROMIDE 20 MG: 10 INJECTION, SOLUTION INTRAVENOUS at 09:51

## 2023-09-15 RX ADMIN — HEPARIN SODIUM 6000 UNITS: 200 INJECTION, SOLUTION INTRAVENOUS at 09:00

## 2023-09-15 RX ADMIN — ROCURONIUM BROMIDE 20 MG: 10 INJECTION, SOLUTION INTRAVENOUS at 08:51

## 2023-09-15 RX ADMIN — PROTAMINE SULFATE 40 MG: 10 INJECTION, SOLUTION INTRAVENOUS at 10:58

## 2023-09-15 RX ADMIN — BUPIVACAINE HYDROCHLORIDE: 5 INJECTION, SOLUTION EPIDURAL; INTRACAUDAL at 09:00

## 2023-09-15 RX ADMIN — PROPOFOL 50 MG: 10 INJECTION, EMULSION INTRAVENOUS at 11:00

## 2023-09-15 RX ADMIN — CEFAZOLIN 2 G: 1 INJECTION, POWDER, FOR SOLUTION INTRAMUSCULAR; INTRAVENOUS at 08:47

## 2023-09-15 RX ADMIN — LIDOCAINE HYDROCHLORIDE 50 MG: 20 INJECTION, SOLUTION EPIDURAL; INFILTRATION; INTRACAUDAL at 08:35

## 2023-09-15 RX ADMIN — LIDOCAINE HYDROCHLORIDE 4 ML: 40 SOLUTION TOPICAL at 08:37

## 2023-09-15 RX ADMIN — HEPARIN SODIUM 18000 UNITS: 1000 INJECTION, SOLUTION INTRAVENOUS; SUBCUTANEOUS at 10:47

## 2023-09-15 RX ADMIN — SODIUM CHLORIDE: 9 INJECTION, SOLUTION INTRAVENOUS at 08:26

## 2023-09-15 RX ADMIN — FENTANYL CITRATE 50 MCG: 50 INJECTION, SOLUTION INTRAMUSCULAR; INTRAVENOUS at 08:41

## 2023-09-15 RX ADMIN — DEXAMETHASONE SODIUM PHOSPHATE 8 MG: 4 INJECTION INTRA-ARTICULAR; INTRALESIONAL; INTRAMUSCULAR; INTRAVENOUS; SOFT TISSUE at 08:44

## 2023-09-15 RX ADMIN — LIDOCAINE HYDROCHLORIDE: 20 INJECTION, SOLUTION INFILTRATION; PERINEURAL at 09:00

## 2023-09-15 RX ADMIN — PROPOFOL 150 MG: 10 INJECTION, EMULSION INTRAVENOUS at 08:35

## 2023-09-15 RX ADMIN — SUGAMMADEX 200 MG: 100 INJECTION, SOLUTION INTRAVENOUS at 10:59

## 2023-09-15 RX ADMIN — FENTANYL CITRATE 50 MCG: 50 INJECTION, SOLUTION INTRAMUSCULAR; INTRAVENOUS at 11:00

## 2023-09-15 RX ADMIN — ONDANSETRON 4 MG: 2 INJECTION INTRAMUSCULAR; INTRAVENOUS at 10:57

## 2023-09-15 RX ADMIN — MIDAZOLAM 2 MG: 1 INJECTION, SOLUTION INTRAMUSCULAR; INTRAVENOUS at 08:31

## 2023-09-15 ASSESSMENT — FIBROSIS 4 INDEX: FIB4 SCORE: 1.73

## 2023-09-15 ASSESSMENT — PAIN DESCRIPTION - PAIN TYPE
TYPE: SURGICAL PAIN

## 2023-09-15 NOTE — OR NURSING
Patient arrived to PACU in stable condition.  Oral airway in place, removed at 1115  Access sites to bilateral groins, dressings CDI and soft.  Bedrest completed in PACU, patient ambulated with no change in dressing  Patient comfortable and denies pain  Wife Annalisa updated on patient condition   Patient tolerated clears without nausea or vomiting  Report given to Sneha HELLER. Patient transferred to phase 2

## 2023-09-15 NOTE — ANESTHESIA PREPROCEDURE EVALUATION
" Date/Time: 09/15/23 0900    Scheduled providers: Alda Winslow M.D.; Maico Alvarez D.O.    Procedure: CL-EP ABLATION ATRIAL FIBRILLATION    Diagnosis: Persistent atrial fibrillation (HCC) [I48.19]    Indications: See Associated Dx    Location: [facility] - Cath Lab [place]        Anes H&P:  PAST MEDICAL HISTORY:   73 y.o. male who presents for .  He has current and past medical problems significant for:    Past Medical History:   Diagnosis Date   • A-fib (HCC) 06/22/2023    \"will have an ablation in a few weeks\"   • AV block 07/01/2015    Status post PPM implantation.   • AVNRT (AV hitesh re-entry tachycardia) (HCC)    • Bleeding from the nose    • Cancer (HCC)     prostate cancer   • GERD (gastroesophageal reflux disease)     • Heart burn    • High cholesterol    • Hyperlipemia    • Hypertension    • Indigestion    • Pacemaker    • Urinary incontinence        SMOKING/ALCOHOL/RECREATIONAL DRUG USE:  Social History     Tobacco Use   • Smoking status: Never   • Smokeless tobacco: Never   • Tobacco comments:     Chew was over 35 years ago   Vaping Use   • Vaping Use: Never used   Substance Use Topics   • Alcohol use: Yes     Alcohol/week: 1.2 oz     Types: 1 Glasses of wine, 1 Cans of beer per week     Comment: LESS THAN WEEKLY   • Drug use: No     Types: Inhaled     Comment: smoked MJ in college, none since then.     Social History     Substance and Sexual Activity   Drug Use No   • Types: Inhaled    Comment: smoked MJ in college, none since then.       PAST SURGICAL HISTORY:  Past Surgical History:   Procedure Laterality Date   • STRABISMUS REPAIR Bilateral 8/3/2023    Procedure: BILATERAL MEDIAL RECTUS RECESS, LEFT INFERIOR OBLIQUE TRANSPOSITON;  Surgeon: Etelvina Barbour M.D.;  Location: SURGERY SAME DAY [facility];  Service: Ophthalmology   • OTHER  04/2023    prostactectomy   • RECOVERY  07/01/2015    Procedure:  CATH LAB  PM INSERT [facility]  RODRIGUEZ ICD9: 426.13;  Surgeon: Recoveryonly Surgery; "  Location: SURG[place] PRE-POST PROC UNIT [facility];  Service:    • PACEMAKER INSERTION  07/01/2015    [facility] #[id number] implanted by Dr. Sanchez.   • APPENDECTOMY     • INGUINAL HERNIA REPAIR BILATERAL     • PILONIDAL CYST EXCISION         ALLERGIES:   No Known Allergies    MEDICATIONS:  No current facility-administered medications on file prior to encounter.     Current Outpatient Medications on File Prior to Encounter   Medication Sig Dispense Refill   • metoprolol SR (TOPROL XL) 25 MG TABLET SR 24 HR Take 1 Tablet by mouth every day. 100 Tablet 4   • rivaroxaban (XARELTO) 20 MG Tab tablet Take 1 Tablet by mouth with dinner. 30 Tablet 11   • simvastatin (ZOCOR) 40 MG Tab Take 1 Tablet by mouth every evening. 90 Tablet 3   • lisinopril (PRINIVIL) 10 MG Tab Take 1 Tablet by mouth every evening. 90 Tablet 3   • pantoprazole (PROTONIX) 40 MG Tablet Delayed Response Take 40 mg by mouth every evening.     • Multiple Vitamins-Minerals (CENTRUM SILVER ULTRA MENS PO) Take 1 Tab by mouth every evening.         LABS:  Lab Results   Component Value Date/Time    HEMOGLOBIN 15.4 09/14/2023 1052    HEMATOCRIT 46.1 09/14/2023 1052    WBC 7.1 09/14/2023 1052     Lab Results   Component Value Date/Time    SODIUM 142 09/14/2023 1052    POTASSIUM 3.8 09/14/2023 1052    CHLORIDE 107 09/14/2023 1052    CO2 23 09/14/2023 1052    GLUCOSE 103 (H) 09/14/2023 1052    BUN 23 (H) 09/14/2023 1052    CALCIUM 9.2 09/14/2023 1052         PREVIOUS ANESTHETICS: See EMR  __________________________________________      Relevant Problems   CARDIAC   (positive) AVNRT (AV hitesh re-entry tachycardia) (HCC)   (positive) Cardiac pacemaker in situ   (positive) Hypertension   (positive) Paroxysmal atrial fibrillation (HCC)   (positive) Second degree AV block      GI   (positive) GERD (gastroesophageal reflux disease)       Physical Exam    Airway   Mallampati: II  TM distance: >3 FB  Neck ROM: full       Cardiovascular - normal exam  Rhythm:  regular  Rate: normal  (-) murmur     Dental - normal exam           Pulmonary - normal exam  Breath sounds clear to auscultation     Abdominal    Neurological - normal exam                 Anesthesia Plan    ASA 3   ASA physical status 3 criteria: a thrombophilic disease requiring anticoagulation    Plan - general       Airway plan will be ETT          Induction: intravenous    Postoperative Plan: Postoperative administration of opioids is intended.    Pertinent diagnostic labs and testing reviewed    Informed Consent:    Anesthetic plan and risks discussed with patient.    Use of blood products discussed with: patient whom consented to blood products.

## 2023-09-15 NOTE — DISCHARGE INSTRUCTIONS
HOME CARE INSTRUCTIONS    ACTIVITY: Rest and take it easy for the first 24 hours.  A responsible adult is recommended to remain with you during that time.  It is normal to feel sleepy.  We encourage you to not do anything that requires balance, judgment or coordination.    FOR 24 HOURS DO NOT:  Drive, operate machinery or run household appliances.  Drink beer or alcoholic beverages.  Make important decisions or sign legal documents.    SPECIAL INSTRUCTIONS: [facility] Post Ablation Patient Instructions:  No lifting > 10 lbs x 1 week.      No soaking in baths, hot tubs, pools x 1 week.  May shower the day after discharge and take off groin dressings and leave  sites uncovered.  Continue to monitor sites daily for warmth, redness, discolored drainage.  It is common to have a small lump in the area where the cather was (usually the size of a marble); this will go away but takes approximately 6 weeks to normalize.     3.   Please take all medications as prescribed to you; please do not stop any medications prescribed post ablation unless directed by your healthcare provider.      4.   Please do not miss any doses of your blood thinner (if you have been started on, or take chronic blood thinners) without discussion with your healthcare provider first.     5.   Please walk and take deep breaths after discharge.  After discharge, if you experience neurological changes/signs of stroke or high fever you should be seen in the emergency dept.     6.   It is possible you may experience some chest discomfort or chest tightness post ablation.  This is usually secondary to inflammation and irritation of the tissues at the area of the ablation.  If this occurs, it is advised to try 400 mg of Ibuprofen with food as needed up to three times a day for a maximum of two days.  This should help to decrease pain and tissue inflammation.          **Please notify the office ([phone]) if this occurs.          ** DO NOT TAKE Ibuprofen IF HISTORY OF ALLERGY, SIGNIFICANT BLEEDING OR KIDNEY DISEASE WITHOUT DISCUSSING WITH YOUR CARDIOLOGY PROVIDER FIRST.          ** If pain becomes severe or you have additional symptoms you may need to be medically evaluated; please contact the cardiology office ([phone]) for further direction.     7. It is possible that you may experience arrhythmia/Atrial Fibrillation post ablation.  This is secondary to irritation and inflammation of the cardiac tissues from the ablation.  If you have atrial fibrillation all day or feel poorly with it, please notify your cardiologist's via phone ([phone]) or [employer].      8.  Please contact call our office ([phone]) or message via [email or web address] message if you have any questions or concerns post procedurally.    9. You need to be seen for post ablation follow up 3-4 weeks post procedure. An appointment is scheduled for you.  Please contact the office ([phone]) if you need to change your appointment.      DIET: To avoid nausea, slowly advance diet as tolerated, avoiding spicy or greasy foods for the first day.  Add more substantial food to your diet according to your physician's instructions.  Babies can be fed formula or breast milk as soon as they are hungry.  INCREASE FLUIDS AND FIBER TO AVOID CONSTIPATION.      MEDICATIONS: Resume taking daily medication.  Take prescribed pain medication with food.  If no medication is prescribed, you may take non-aspirin pain medication if needed.  PAIN MEDICATION CAN BE VERY CONSTIPATING.  Take a stool softener or laxative such as senokot, pericolace, or milk of magnesia if needed.      You should CALL YOUR PHYSICIAN if you develop:  Fever greater than 101 degrees F.  Pain not relieved by medication, or persistent nausea or vomiting.  Excessive bleeding (blood soaking through dressing) or unexpected drainage from the wound.  Extreme redness or swelling around the incision site, drainage of pus or  foul smelling drainage.  Inability to urinate or empty your bladder within 8 hours.  Problems with breathing or chest pain.    You should call 911 if you develop problems with breathing or chest pain.  If you are unable to contact your doctor or surgical center, you should go to the nearest emergency room or urgent care center.  Physician's telephone #: [phone]    MILD FLU-LIKE SYMPTOMS ARE NORMAL.  YOU MAY EXPERIENCE GENERALIZED MUSCLE ACHES, THROAT IRRITATION, HEADACHE AND/OR SOME NAUSEA.    If any questions arise, call your doctor.  If your doctor is not available, please feel free to call the Surgical Center at [phone].  The Center is open Monday through Friday from 7AM to 7PM.      A registered nurse may call you a few days after your surgery to see how you are doing after your procedure.    You may also receive a survey in the mail within the next two weeks and we ask that you take a few moments to complete the survey and return it to us.  Our goal is to provide you with very good care and we value your comments.     Depression / Suicide Risk    As you are discharged from this [facility] facility, it is important to learn how to keep safe from harming yourself.    Recognize the warning signs:  Abrupt changes in personality, positive or negative- including increase in energy   Giving away possessions  Change in eating patterns- significant weight changes-  positive or negative  Change in sleeping patterns- unable to sleep or sleeping all the time   Unwillingness or inability to communicate  Depression  Unusual sadness, discouragement and loneliness  Talk of wanting to die  Neglect of personal appearance   Rebelliousness- reckless behavior  Withdrawal from people/activities they love  Confusion- inability to concentrate     If you or a loved one observes any of these behaviors or has concerns about self-harm, here's what you can do:  Talk about it- your feelings and reasons for harming  yourself  Remove any means that you might use to hurt yourself (examples: pills, rope, extension cords, firearm)  Get professional help from the community (Mental Health, Substance Abuse, psychological counseling)  Do not be alone:Call your Safe Contact- someone whom you trust who will be there for you.  Call your local CRISIS HOTLINE 273-3732 or 490-087-6165  Call your local Children's Mobile Crisis Response Team [place] (659) 426-2678 or www.[employer]  Call the toll free National Suicide Prevention Hotlines   National Suicide Prevention Lifeline 039-745-SAEM (5586)  National Hope Line Network 800-SUICIDE (614-3219)    I acknowledge receipt and understanding of these Home Care instructions.

## 2023-09-15 NOTE — OP REPORT
"Electrophysiology Procedure Note    Procedure(s) Performed:   1) Atrial fibrillation ablation and EP study  2) Ablation of additional atrial fibrillation substrate (posterior wall isolation)    Indication(s):  Persistent atrial fibrillation    Physician(s): Alda Winslow M.D.     Resident/Assistant(s): None     Anesthesia: General endotracheal anesthetic.     Specimen(s) Removed: None     Estimated Blood Loss:  30cc     Complications:  None     Description of Procedure:   After informed written consent, the patient was brought to the EP lab in the fasting, non-sedated state. The patient was prepped and draped in the usual sterile fashion. Femoral venous access was obtained using the modified Seldinger technique.  In the left femoral vein, one 7 Fr sheath was inserted over 0.035\" guidewire. In the right femoral vein, 3 sheaths (8,8,8 Fr) were inserted over 0.035” guidewires. A deflectable decapolar catheter was advanced to the CS position. Baseline rhythm atrial fibrillation. An intracardiac echo (ICE) catheter was advanced to the right atrium. ICE was used to identify the atrial septum, left atrial appendage, and pulmonary veins and ld the anatomic structures to superimpose on our 3D electroanatomic map using CARTOSound. During the procedure, ICE was utilized to localize the ablation catheter, monitor for thrombus formation, and to exclude pericardial effusion. Intravenous heparin was administered to maintain -350 seconds. Double transseptal left heart catheterization was performed under intracardiac echo and hemodynamic guidance. A Polk needle was inserted into the 8.5 Fr sheaths (ML1) for transseptal puncture and placement of sheaths in the left atrium. The initial trans-septal was exchanged for a medium curved Biosense Vizigo sheath and then the ML1 re-used for a second trans-septal. Mapping of the pulmonary veins and left atrium was performed using CARTO3 FAM and a deflectable multipolar mapping " catheter ([employer] OctaRay). This showed a dilated LA with four large pulmonary veins. Wide antral circumferential ablation was performed using an 3.5 mm deflectable irrigated force contact catheter (Biosense ST SF) at primarily 40 W power starting from the L sided veins and then proceeding along to the RSPV and then finally the RIPV. Bidirectional pulmonary vein antrum isolation was verified by pacing inside the vein and confirming exit block along with absence of local PV signals on the PentaRay. Next we proceeded to ablate along the roof and floor to isolate the posterior wall. We did not get first pass isolation here. Patient underwent DC cardioversion at this point with 200 J biphasic energy into sinus rhythm. We noted intermittent AT vs PMT. With changes to PPM programming this went away suggestive of PMT. Additional debulking within the posterior wall underneath the roof line and areas of early/sharp voltage outside the R sided PV antrum. Further attempts to induce arrhythmia were unsuccessful. At the end of the procedure, heparin was reversed with protamine, the catheter and sheaths were removed, and hemostasis was achieved by manual compression and Vascade MVP closure device. Following recovery from anesthesia, the patient was transferred to the PACU in good condition.       Total ablation time: 2002 seconds    Fluoroscopy time: 9.1 minutes     Electrophysiologic Findings:    1. Sinus  830 ms, HV 47 ms.     Impressions:    1. Persistent atrial fibrillation.    2. Successful RF ablation pulmonary vein isolation procedure.    3. Successful RF isolation of LA posterior wall    Recommendations:  1. Resume anticoagulation.  2. Start carafate x 2 weeks and daily PPI x 1 month.  3. Transfer to monitored bedrest.

## 2023-09-15 NOTE — PROGRESS NOTES
Pt arrived from PACU 1355, alert and oriented x4, tolerating PO fluids, incision sites CDI bilaterally, discharge instructions reviewed and all questions answered. Dr Winslow said ok to just take protonix and not omeprazole. Cardiology APRN's to bedside to discuss results and questions, pt discharged home with wife.

## 2023-09-15 NOTE — ANESTHESIA PROCEDURE NOTES
Airway    Date/Time: 9/15/2023 8:38 AM    Performed by: Maico Alvarez D.O.  Authorized by: Maico Alvarez D.O.    Location:  OR  Urgency:  Elective  Difficult Airway: No    Indications for Airway Management:  Anesthesia      Spontaneous Ventilation: absent    Sedation Level:  Deep  Preoxygenated: Yes    Patient Position:  Sniffing  MILS Maintained Throughout: No    Mask Difficulty Assessment:  1 - vent by mask  Final Airway Type:  Endotracheal airway  Final Endotracheal Airway:  ETT  Cuffed: Yes    Technique Used for Successful ETT Placement:  Direct laryngoscopy    Insertion Site:  Oral  Blade Type:  Janet  Laryngoscope Blade/Videolaryngoscope Blade Size:  4  ETT Size (mm):  7.0  Measured from:  Teeth  ETT to Teeth (cm):  22  Placement Verified by: auscultation and capnometry    Cormack-Lehane Classification:  Grade IIa - partial view of glottis  Number of Attempts at Approach:  1  Ventilation Between Attempts:  None  Number of Other Approaches Attempted:  0

## 2023-09-15 NOTE — ANESTHESIA TIME REPORT
Anesthesia Start and Stop Event Times     Date Time Event    9/15/2023 0808 Ready for Procedure     0826 Anesthesia Start     1112 Anesthesia Stop        Responsible Staff  09/15/23    Name Role Begin End    Maico Alvarez D.O. Anesth 0826 1112        Overtime Reason:  no overtime (within assigned shift)    Comments:

## 2023-09-15 NOTE — H&P
"EP Pre-procedure History and Physical    Date of service: 9/15/2023    Reason for visit/Chief complaint: SVT    HPI:   Pt is a 74 yo M. History AVNRT and PPM. Paroxysmal AF that has turned persistent. Here for AF ablation.    Past Medical History:   Diagnosis Date    A-fib (HCC) 06/22/2023    \"will have an ablation in a few weeks\"    AV block 07/01/2015    Status post PPM implantation.    AVNRT (AV hitesh re-entry tachycardia) (HCC)     Bleeding from the nose     Cancer (HCC)     prostate cancer    GERD (gastroesophageal reflux disease)      Heart burn     High cholesterol     Hyperlipemia     Hypertension     Indigestion     Pacemaker     Urinary incontinence      Past Surgical History:   Procedure Laterality Date    STRABISMUS REPAIR Bilateral 8/3/2023    Procedure: BILATERAL MEDIAL RECTUS RECESS, LEFT INFERIOR OBLIQUE TRANSPOSITON;  Surgeon: Etelvina Barbour M.D.;  Location: SURGERY SAME DAY [facility];  Service: Ophthalmology    OTHER  04/2023    prostactectomy    RECOVERY  07/01/2015    Procedure:  CATH LAB  PM INSERT [facility] ICD9: 426.13;  Surgeon: [facility];  Location: SURGERY PRE-POST PROC UNIT [facility];  Service:     PACEMAKER INSERTION  07/01/2015    [facility] #[id number] implanted by Dr. Sanchez.    APPENDECTOMY      INGUINAL HERNIA REPAIR BILATERAL      PILONIDAL CYST EXCISION       Family History   Problem Relation Age of Onset    Heart Disease Mother     Diabetes Mother     Cancer Father 86        Pancreatitic         Physical Exam:  Vitals:    09/15/23 0707   BP: (!) 148/95   Pulse: 66   Resp: 14   Temp: 36.2 °C (97.1 °F)   TempSrc: Temporal   SpO2: 93%   Weight: 87.9 kg (193 lb 12.6 oz)   Height: 1.753 m (5' 9\")     Gen: NAD, conversant  HEENT: PERRL, EOMI  LUNGS: CTA B, no w/r/r  CV: RRR, no m/r/g, no JVD  Abd: Soft, NT/ND, +BS  Ext: no edema, warm and well perfused    Labs reviewed    EKG interpreted by me:  AF/V paced    Impression/Recs:  1. Persistent atrial fibrillation (HCC) "  CL-EP ABLATION ATRIAL FIBRILLATION    CL-EP ABLATION ATRIAL FIBRILLATION    EC-ELENA W/ CONT    EC-ELENA W/ CONT        -Risks/benefits/alternatives discussed  -All questions answered  -Proceed with AF ablation    Alda Winslow MD

## 2023-09-16 ENCOUNTER — APPOINTMENT (OUTPATIENT)
Dept: CARDIOLOGY | Facility: MEDICAL CENTER | Age: 73
End: 2023-09-16
Attending: EMERGENCY MEDICINE
Payer: MEDICARE

## 2023-09-16 ENCOUNTER — HOSPITAL ENCOUNTER (EMERGENCY)
Facility: MEDICAL CENTER | Age: 73
End: 2023-09-16
Attending: EMERGENCY MEDICINE
Payer: MEDICARE

## 2023-09-16 ENCOUNTER — APPOINTMENT (OUTPATIENT)
Dept: RADIOLOGY | Facility: MEDICAL CENTER | Age: 73
End: 2023-09-16
Attending: EMERGENCY MEDICINE
Payer: MEDICARE

## 2023-09-16 VITALS
HEART RATE: 75 BPM | DIASTOLIC BLOOD PRESSURE: 71 MMHG | OXYGEN SATURATION: 95 % | TEMPERATURE: 98.5 F | RESPIRATION RATE: 22 BRPM | SYSTOLIC BLOOD PRESSURE: 134 MMHG

## 2023-09-16 DIAGNOSIS — I30.9 ACUTE PERICARDITIS, UNSPECIFIED TYPE: ICD-10-CM

## 2023-09-16 LAB
ANION GAP SERPL CALC-SCNC: 16 MMOL/L (ref 7–16)
APTT PPP: 26.8 SEC (ref 24.7–36)
BASOPHILS # BLD AUTO: 0.3 % (ref 0–1.8)
BASOPHILS # BLD: 0.05 K/UL (ref 0–0.12)
BUN SERPL-MCNC: 24 MG/DL (ref 8–22)
CALCIUM SERPL-MCNC: 8.6 MG/DL (ref 8.4–10.2)
CHLORIDE SERPL-SCNC: 106 MMOL/L (ref 96–112)
CO2 SERPL-SCNC: 17 MMOL/L (ref 20–33)
CREAT SERPL-MCNC: 0.83 MG/DL (ref 0.5–1.4)
EKG IMPRESSION: NORMAL
EOSINOPHIL # BLD AUTO: 0.03 K/UL (ref 0–0.51)
EOSINOPHIL NFR BLD: 0.2 % (ref 0–6.9)
ERYTHROCYTE [DISTWIDTH] IN BLOOD BY AUTOMATED COUNT: 48.1 FL (ref 35.9–50)
GFR SERPLBLD CREATININE-BSD FMLA CKD-EPI: 91 ML/MIN/1.73 M 2
GLUCOSE SERPL-MCNC: 185 MG/DL (ref 65–99)
HCT VFR BLD AUTO: 42.5 % (ref 42–52)
HGB BLD-MCNC: 14.2 G/DL (ref 14–18)
IMM GRANULOCYTES # BLD AUTO: 0.06 K/UL (ref 0–0.11)
IMM GRANULOCYTES NFR BLD AUTO: 0.4 % (ref 0–0.9)
INR PPP: 1.85 (ref 0.87–1.13)
LV EJECT FRACT  99904: 40
LV EJECT FRACT MOD 2C 99903: 53.24
LV EJECT FRACT MOD 4C 99902: 41.33
LV EJECT FRACT MOD BP 99901: 41.77
LYMPHOCYTES # BLD AUTO: 2.08 K/UL (ref 1–4.8)
LYMPHOCYTES NFR BLD: 13.6 % (ref 22–41)
MCH RBC QN AUTO: 31.9 PG (ref 27–33)
MCHC RBC AUTO-ENTMCNC: 33.4 G/DL (ref 32.3–36.5)
MCV RBC AUTO: 95.5 FL (ref 81.4–97.8)
MONOCYTES # BLD AUTO: 1.26 K/UL (ref 0–0.85)
MONOCYTES NFR BLD AUTO: 8.2 % (ref 0–13.4)
NEUTROPHILS # BLD AUTO: 11.83 K/UL (ref 1.82–7.42)
NEUTROPHILS NFR BLD: 77.3 % (ref 44–72)
NRBC # BLD AUTO: 0 K/UL
NRBC BLD-RTO: 0 /100 WBC (ref 0–0.2)
NT-PROBNP SERPL IA-MCNC: 1106 PG/ML (ref 0–125)
PLATELET # BLD AUTO: ABNORMAL K/UL (ref 164–446)
PLATELET BLD QL SMEAR: NORMAL
PLATELETS.RETICULATED NFR BLD AUTO: 19.7 % (ref 0.6–13.1)
PMV BLD AUTO: 12.4 FL (ref 9–12.9)
POTASSIUM SERPL-SCNC: 3.9 MMOL/L (ref 3.6–5.5)
PROTHROMBIN TIME: 22.2 SEC (ref 12–14.6)
RBC # BLD AUTO: 4.45 M/UL (ref 4.7–6.1)
RBC BLD AUTO: NORMAL
SODIUM SERPL-SCNC: 139 MMOL/L (ref 135–145)
TROPONIN T SERPL-MCNC: 915 NG/L (ref 6–19)
WBC # BLD AUTO: 15.3 K/UL (ref 4.8–10.8)

## 2023-09-16 PROCEDURE — 84484 ASSAY OF TROPONIN QUANT: CPT

## 2023-09-16 PROCEDURE — 99285 EMERGENCY DEPT VISIT HI MDM: CPT

## 2023-09-16 PROCEDURE — 93306 TTE W/DOPPLER COMPLETE: CPT | Mod: 26 | Performed by: INTERNAL MEDICINE

## 2023-09-16 PROCEDURE — 85610 PROTHROMBIN TIME: CPT

## 2023-09-16 PROCEDURE — 700102 HCHG RX REV CODE 250 W/ 637 OVERRIDE(OP): Performed by: EMERGENCY MEDICINE

## 2023-09-16 PROCEDURE — 700111 HCHG RX REV CODE 636 W/ 250 OVERRIDE (IP): Performed by: EMERGENCY MEDICINE

## 2023-09-16 PROCEDURE — 93306 TTE W/DOPPLER COMPLETE: CPT

## 2023-09-16 PROCEDURE — 71045 X-RAY EXAM CHEST 1 VIEW: CPT

## 2023-09-16 PROCEDURE — 96374 THER/PROPH/DIAG INJ IV PUSH: CPT | Mod: XU

## 2023-09-16 PROCEDURE — 93005 ELECTROCARDIOGRAM TRACING: CPT | Performed by: EMERGENCY MEDICINE

## 2023-09-16 PROCEDURE — 83880 ASSAY OF NATRIURETIC PEPTIDE: CPT

## 2023-09-16 PROCEDURE — 36415 COLL VENOUS BLD VENIPUNCTURE: CPT

## 2023-09-16 PROCEDURE — A9270 NON-COVERED ITEM OR SERVICE: HCPCS | Performed by: EMERGENCY MEDICINE

## 2023-09-16 PROCEDURE — 85025 COMPLETE CBC W/AUTO DIFF WBC: CPT

## 2023-09-16 PROCEDURE — 85055 RETICULATED PLATELET ASSAY: CPT

## 2023-09-16 PROCEDURE — 80048 BASIC METABOLIC PNL TOTAL CA: CPT

## 2023-09-16 PROCEDURE — 85730 THROMBOPLASTIN TIME PARTIAL: CPT

## 2023-09-16 RX ORDER — IBUPROFEN 600 MG/1
600 TABLET ORAL EVERY 8 HOURS
Qty: 15 TABLET | Refills: 0 | Status: SHIPPED | OUTPATIENT
Start: 2023-09-16 | End: 2023-09-21

## 2023-09-16 RX ORDER — KETOROLAC TROMETHAMINE 30 MG/ML
15 INJECTION, SOLUTION INTRAMUSCULAR; INTRAVENOUS ONCE
Status: COMPLETED | OUTPATIENT
Start: 2023-09-16 | End: 2023-09-16

## 2023-09-16 RX ORDER — COLCHICINE 0.6 MG/1
1.2 TABLET ORAL DAILY
Status: DISCONTINUED | OUTPATIENT
Start: 2023-09-16 | End: 2023-09-16 | Stop reason: HOSPADM

## 2023-09-16 RX ORDER — COLCHICINE 0.6 MG/1
0.6 TABLET ORAL DAILY
Qty: 14 TABLET | Refills: 0 | Status: SHIPPED | OUTPATIENT
Start: 2023-09-16 | End: 2023-09-30

## 2023-09-16 RX ORDER — ASPIRIN 81 MG/1
324 TABLET, CHEWABLE ORAL ONCE
Status: DISCONTINUED | OUTPATIENT
Start: 2023-09-16 | End: 2023-09-16

## 2023-09-16 RX ADMIN — COLCHICINE 1.2 MG: 0.6 TABLET ORAL at 16:13

## 2023-09-16 RX ADMIN — KETOROLAC TROMETHAMINE 15 MG: 30 INJECTION, SOLUTION INTRAMUSCULAR; INTRAVENOUS at 16:14

## 2023-09-16 ASSESSMENT — HEART SCORE
TROPONIN: GREATER THAN OR EQUAL TO 3 TIMES NORMAL LIMIT
HISTORY: SLIGHTLY SUSPICIOUS
ECG: NON-SPECIFIC REPOLARIZATION DISTURBANCE
HEART SCORE: 6
RISK FACTORS: 1-2 RISK FACTORS
AGE: 65+

## 2023-09-16 ASSESSMENT — PAIN DESCRIPTION - PAIN TYPE
TYPE: ACUTE PAIN
TYPE: ACUTE PAIN

## 2023-09-16 ASSESSMENT — PAIN DESCRIPTION - DESCRIPTORS: DESCRIPTORS: ACHING

## 2023-09-16 NOTE — ED PROVIDER NOTES
ED Provider Note    CHIEF COMPLAINT  Chest pain    EXTERNAL RECORDS REVIEWED  Outpatient Notes   and Outpatient labs & studies ELENA and ablation performed yesterday    HPI/ROS  LIMITATION TO HISTORY   Select: : None  OUTSIDE HISTORIAN(S):  Wife at bedside    Emil Reid is a 73 y.o. male who presents to the emergency department with chief complaint of chest pain shortness of breath.  Patient had ELENA and ablation for atrial fibrillation performed yesterday at our main campus.  Patient states he did fine throughout the night most of the morning today.  2 hours prior to presentation he reports acute onset of chest pain and moderate shortness of breath stated he felt as though he could not take a deep breath and with any deep inspiration but worsening of his pain.  No abdominal pain no cough no fevers no chills no pain swelling or inflammation in his lower extremities no other acute symptom change or concern.    PAST MEDICAL HISTORY   has a past medical history of A-fib (HCC) (06/22/2023), AV block (07/01/2015), AVNRT (AV hitesh re-entry tachycardia) ([facility]), Bleeding from the nose, Cancer ([facility]), GERD (gastroesophageal reflux disease) ( ), Heart burn, High cholesterol, Hyperlipemia, Hypertension, Indigestion, Pacemaker, and Urinary incontinence.    SURGICAL HISTORY   has a past surgical history that includes appendectomy; inguinal hernia repair bilateral; pilonidal cyst excision; recovery (07/01/2015); pacemaker insertion (07/01/2015); other (04/2023); and strabismus repair (Bilateral, 8/3/2023).    FAMILY HISTORY  Family History   Problem Relation Age of Onset    Heart Disease Mother     Diabetes Mother     Cancer Father 86        Pancreatitic       SOCIAL HISTORY  Social History     Tobacco Use    Smoking status: Never    Smokeless tobacco: Never    Tobacco comments:     Chew was over 35 years ago   Vaping Use    Vaping Use: Never used   Substance and Sexual Activity    Alcohol use: Yes     Alcohol/week: 1.2 oz      Types: 1 Glasses of wine, 1 Cans of beer per week     Comment: LESS THAN WEEKLY    Drug use: No     Types: Inhaled     Comment: smoked MJ in college, none since then.    Sexual activity: Yes     Partners: Female, Male       CURRENT MEDICATIONS  Home Medications    **Home medications have not yet been reviewed for this encounter**         ALLERGIES  No Known Allergies    PHYSICAL EXAM  VITAL SIGNS: BP (!) (P) 164/98   Pulse (P) 99   Resp (!) (P) 27   SpO2 (P) 90%      Pulse ox interpretation: I interpret this pulse ox as normal.  Constitutional: Alert and oriented x 3, moderate distress  HEENT: Atraumatic normocephalic, pupils are equal round reactive to light extraocular movements are intact. The nares is clear, external ears are normal, mouth shows moist mucous membranes normal dentition for age  Neck: Supple, no JVD no tracheal deviation  Cardiovascular: Tachycardic no murmur rub or gallop 2+ pulses peripherally x4  Thorax & Lungs: Tachypneic no adventitious sounds  GI: Soft nontender nondistended positive bowel sounds, no peritoneal signs  Skin: Warm dry no acute rash or lesion  Musculoskeletal: Moving all extremities with full range and 5 of 5 strength no acute  deformity  Neurologic: Cranial nerves III through XII are grossly intact no sensory deficit no cerebellar dysfunction   Psychiatric: Appropriate affect for situation at this time      DIAGNOSTIC STUDIES / PROCEDURES  Results for orders placed or performed during the hospital encounter of 09/16/23   EC-ECHOCARDIOGRAM COMPLETE W/O CONT   Result Value Ref Range    Eject.Frac. MOD BP 41.77     Eject.Frac. MOD 4C 41.33     Eject.Frac. MOD 2C 53.24     Left Ventrical Ejection Fraction 40    CBC w/ Differential   Result Value Ref Range    WBC 15.3 (H) 4.8 - 10.8 K/uL    RBC 4.45 (L) 4.70 - 6.10 M/uL    Hemoglobin 14.2 14.0 - 18.0 g/dL    Hematocrit 42.5 42.0 - 52.0 %    MCV 95.5 81.4 - 97.8 fL    MCH 31.9 27.0 - 33.0 pg    MCHC 33.4 32.3 - 36.5 g/dL    RDW  48.1 35.9 - 50.0 fL    Platelet Count 122; # - 446 K/uL    MPV 12.4 9.0 - 12.9 fL    Neutrophils-Polys 77.30 (H) 44.00 - 72.00 %    Lymphocytes 13.60 (L) 22.00 - 41.00 %    Monocytes 8.20 0.00 - 13.40 %    Eosinophils 0.20 0.00 - 6.90 %    Basophils 0.30 0.00 - 1.80 %    Immature Granulocytes 0.40 0.00 - 0.90 %    Nucleated RBC 0.00 0.00 - 0.20 /100 WBC    Neutrophils (Absolute) 11.83 (H) 1.82 - 7.42 K/uL    Lymphs (Absolute) 2.08 1.00 - 4.80 K/uL    Monos (Absolute) 1.26 (H) 0.00 - 0.85 K/uL    Eos (Absolute) 0.03 0.00 - 0.51 K/uL    Baso (Absolute) 0.05 0.00 - 0.12 K/uL    Immature Granulocytes (abs) 0.06 0.00 - 0.11 K/uL    NRBC (Absolute) 0.00 K/uL   Basic Metabolic Panel (BMP)   Result Value Ref Range    Sodium 139 135 - 145 mmol/L    Potassium 3.9 3.6 - 5.5 mmol/L    Chloride 106 96 - 112 mmol/L    Co2 17 (L) 20 - 33 mmol/L    Glucose 185 (H) 65 - 99 mg/dL    Bun 24 (H) 8 - 22 mg/dL    Creatinine 0.83 0.50 - 1.40 mg/dL    Calcium 8.6 8.4 - 10.2 mg/dL    Anion Gap 16.0 7.0 - 16.0   proBrain Natriuretic Peptide, NT   Result Value Ref Range    NT-proBNP 1106 (H) 0 - 125 pg/mL   Troponin - STAT Once   Result Value Ref Range    Troponin T 915 (H) 6 - 19 ng/L   PT/INR   Result Value Ref Range    PT 22.2 (H) 12.0 - 14.6 sec    INR 1.85 (H) 0.87 - 1.13   APTT   Result Value Ref Range    APTT 26.8 24.7 - 36.0 sec   ESTIMATED GFR   Result Value Ref Range    GFR (CKD-EPI) 91 >60 mL/min/1.73 m 2   PLATELET ESTIMATE   Result Value Ref Range    Plt Estimation Decreased    IMMATURE PLT FRACTION   Result Value Ref Range    Imm. Plt Fraction 19.7 (H) 0.6 - 13.1 %   MORPHOLOGY   Result Value Ref Range    RBC Morphology Normal    EKG   Result Value Ref Range    Report       [facility] Emergency Dept.    Test Date:  2023  Pt Name:    ISRA SIMMONS                Department: [facility]  MRN:        [id number]                      Room:  Gender:     Male                         Technician: JERARDO  :         [id number]                   Requested By:ER TRIAGE PROTOCOL  Order #:    [id number]                    Reading MD: JOSE CARRENO MD    Measurements  Intervals                                Axis  Rate:       95                           P:          56  MT:         180                          QRS:        -73  QRSD:       186                          T:          102  QT:         580  QTc:        730    Interpretive Statements  Ventricular-paced complexes  No further rhythm analysis attempted due to paced rhythm  Left bundle branch block  Compared to ECG 09/15/2023 11:44:12  Left bundle-branch block now present  Atrial-sensed ventricular-paced complex(es) or rhythm no longer present  Electronically Signed On 09- 17:31:47 PDT  by JOSE CARRENO MD        RADIOLOGY  DX-CHEST-PORTABLE (1 VIEW)   Final Result         1.  Mild perihilar opacifications are noted which could be due to edema.      2.  Mild cardiomegaly.      3.  No consolidations.      EC-ECHOCARDIOGRAM COMPLETE W/O CONT   Final Result            COURSE & MEDICAL DECISION MAKING    ED Observation Status? Yes; I am placing the patient in to an observation status due to a diagnostic uncertainty as well as therapeutic intensity. Patient placed in observation status at 15:32 PM, 9/16/2023.     Observation plan is as follows: Chest x-ray Labs anti-inflammatories and echocardiogram    Upon Reevaluation, the patient's condition has: Improved; and will be discharged.    Patient discharged from ED Observation status at 19:38 (Time) 09/16/23   (Date).     ASSESSMENT, COURSE AND PLAN  Care Narrative: Pleasant 73-year-old male status post ablation yesterday.  Patient difficult to read EKG with pacemaker in place however there are some intrinsic beats that do not demonstrate any concerning ST elevation.  Also very complicated situation as patient had extensive ablation yesterday which will obviously elicit high troponin.  Discussed the case with the  patient's cardiologist Dr. Winslow who performed the ablation yesterday who feels that though this is most likely representative of acute pericarditis status post ablation.  Patient given a dose of colchicine here and a dose of Toradol.  He was observed for several hours and had near total resolution of all of his symptoms.  We also obtained stat echocardiogram which does not show any regional wall motion abnormalities.  At this point I had extensive discussion with the patient he understands that should he have any further worsening chest pain shortness of breath any other acute symptom changes or concerns that he is to return immediately.  He otherwise is to take colchicine daily he is also to take scheduled ibuprofen every 8 hours for the next 5 days.  I given him instructions to always take this with food to avoid any gastritis.  Patient is to follow-up with Dr. Winslow his cardiologist at the next available time he is otherwise understanding agreeable discharged in stable and improved condition.        ADDITIONAL PROBLEM LIST    DISPOSITION AND DISCUSSIONS    I have discussed management of the patient with the following physicians and JAE's: MD Dino, cardiology, MD Jessica, cardiology    Discussion of management with other [facility] or appropriate source(s): None     Escalation of care considered, and ultimately not performed:acute inpatient care management, however at this time, the patient is most appropriate for outpatient management    Barriers to care at this time, including but not limited to: .     Decision tools and prescription drugs considered including, but not limited to: HEART Score   .  Heart score is 6 however patient has obvious reason for repolarization abnormalities as well as elevated troponin.  Cardiologist does not have concern for acute coronary syndrome and we are in agreement that patient is appropriate for outpatient management at this time.  /71   Pulse 75   Temp 36.9 °C (98.5 °F) (Temporal)    Resp (!) 22   SpO2 95%     Alda Winslow M.D.  [address]  [address]  [place] [id number] [phone]    Schedule an appointment as soon as possible for a visit       [facility], Emergency Dept  [address]  [place] [id number] [phone]    in 12-24 hours if symptoms persist, immediately If symptoms worsen, or if you develop any other symptoms or concerns        FINAL DIAGNOSIS  1. Acute pericarditis, unspecified type Active   2.  Chest pain, resolved  3.  Shortness of breath, resolved       Electronically signed by: Javad Cramer M.D., 9/16/2023

## 2023-09-16 NOTE — ANESTHESIA POSTPROCEDURE EVALUATION
Patient: Emil Reid    Procedure Summary     Date: 09/15/23 Room / Location: [facility]    Anesthesia Start: 0826 Anesthesia Stop: 1112    Procedure: CL-EP ABLATION ATRIAL FIBRILLATION Diagnosis:       Persistent atrial fibrillation (HCC)      (See Associated Dx)    Scheduled Providers: Alda Winslow M.D.; Maico Alvarez D.O. Responsible Provider: Maico Alvarez D.O.    Anesthesia Type: general ASA Status: 3          Final Anesthesia Type: general  Last vitals  BP   Blood Pressure : (!) 153/92    Temp   36.2 °C (97.2 °F)    Pulse   81   Resp   16    SpO2   95 %      Anesthesia Post Evaluation    Patient location during evaluation: PACU  Patient participation: complete - patient participated  Level of consciousness: awake and alert    Airway patency: patent  Anesthetic complications: no  Cardiovascular status: hemodynamically stable  Respiratory status: acceptable  Hydration status: euvolemic    PONV: none          No notable events documented.     Nurse Pain Score: 0 (NPRS)

## 2023-09-16 NOTE — ED NOTES
Introduced self to pt. Assumed care with ARRON Jack. PIV established, blood sent to lab. Pt resting in [facility]. Acute respiratory distress related to discomfort and pain in the chest noted. Pt placed on 2L O2 NC.

## 2023-09-16 NOTE — ED NOTES
"Pt AO4, sitting up in gurney. States mid chest pain 4/10 after receiving medications, \"I feel more comfortable and I can actually take a deep breath now.\"  Safety precautions in place including gurney locked in lowest position, both side rails up, call light within reach. Pt educated to use call light if requiring any assistance with getting oob.     "

## 2023-09-16 NOTE — ED TRIAGE NOTES
Chief Complaint   Patient presents with    Chest Pain     Sudden onset constant mid sternal chest pain 2-3 hours ago. Pt had ELENA with ablation for afib yesterday. Took 400mg IBU 2 hours ago.      BP (!) 164/98   Pulse 99   Resp (!) 27   SpO2 90%     Pt bib spouse as walk-in. EKG completed, pt immediately roomed, triage at bedside.

## 2023-09-16 NOTE — ED NOTES
Pt AO4, sitting up in gurney, appears to be uncomfortably d/t chest pain. Reports central chest pain 8/10 and constant- medication administered as ordered.   Echo at bedside.   Safety precautions in place including gurney locked in lowest position, both side rails up, call light within reach. Pt educated to use call light if requiring any assistance with getting oob.

## 2023-09-16 NOTE — ED NOTES
Med rec is complete per Patient and family at bedside.  Visitor was present at time of interview with permission from Patient.    Allergies reviewed.    Has patient had any outside antibiotics in the last 30 days? N    Any Anticoagulants (rivaroxaban, apixaban, edoxaban, dabigatran, warfarin, enoxaparin) taken in the last 14 days? Y  Anticoagulant name: Xarelto 20 mg, Last dose: 9/15/23.    Chemo or Outpatient Infusions? N       Preferred pharmacy for this visit : [facility] [phone]

## 2023-09-17 NOTE — ED NOTES
Pt Aox4, not in any form of distress. Pt laying comfortably in bed, warm blankets provided, side rails raised up, bed locked. Call bell within reach.     Pt denies pain but pressure whenever he breathes deeply.

## 2023-09-17 NOTE — ED NOTES
Pt AO4, resting comfortably on gurney. States mid chest pain now 2-3/10. Denies any needs at this time.   Safety precautions in place including gurney locked in lowest position, both side rails up, call light within reach. Pt educated to use call light if requiring any assistance with getting oob.

## 2023-09-22 ENCOUNTER — OFFICE VISIT (OUTPATIENT)
Dept: CARDIOLOGY | Facility: MEDICAL CENTER | Age: 73
End: 2023-09-22
Attending: INTERNAL MEDICINE
Payer: MEDICARE

## 2023-09-22 VITALS
OXYGEN SATURATION: 94 % | HEART RATE: 81 BPM | RESPIRATION RATE: 16 BRPM | WEIGHT: 194 LBS | BODY MASS INDEX: 28.73 KG/M2 | HEIGHT: 69 IN | SYSTOLIC BLOOD PRESSURE: 122 MMHG | DIASTOLIC BLOOD PRESSURE: 88 MMHG

## 2023-09-22 DIAGNOSIS — I48.19 PERSISTENT ATRIAL FIBRILLATION (HCC): ICD-10-CM

## 2023-09-22 DIAGNOSIS — I50.22 CHRONIC SYSTOLIC CHF (CONGESTIVE HEART FAILURE), NYHA CLASS 2 (HCC): ICD-10-CM

## 2023-09-22 LAB — EKG IMPRESSION: NORMAL

## 2023-09-22 PROCEDURE — 3074F SYST BP LT 130 MM HG: CPT | Performed by: INTERNAL MEDICINE

## 2023-09-22 PROCEDURE — 99212 OFFICE O/P EST SF 10 MIN: CPT | Performed by: INTERNAL MEDICINE

## 2023-09-22 PROCEDURE — 93010 ELECTROCARDIOGRAM REPORT: CPT | Performed by: INTERNAL MEDICINE

## 2023-09-22 PROCEDURE — 93005 ELECTROCARDIOGRAM TRACING: CPT | Performed by: INTERNAL MEDICINE

## 2023-09-22 PROCEDURE — 93280 PM DEVICE PROGR EVAL DUAL: CPT | Performed by: INTERNAL MEDICINE

## 2023-09-22 PROCEDURE — 99024 POSTOP FOLLOW-UP VISIT: CPT | Performed by: INTERNAL MEDICINE

## 2023-09-22 PROCEDURE — 3079F DIAST BP 80-89 MM HG: CPT | Performed by: INTERNAL MEDICINE

## 2023-09-22 NOTE — PROGRESS NOTES
"Arrhythmia Clinic Note (Established patient)    DOS: 9/22/2023    Chief complaint/Reason for consult: F/u AF ablation    Interval History:  Pt is a 72 yo gentleman. Very familiar to me. History of AVNRT s/p prior ablation, AV block s/p PPM. I met him earlier this year for generator at [facility]. Found to be in persistent AF without any attempt at rhythm control. We performed an AF ablation during which his LA found to be fairly dilated, lots of ablation done in order to isolate the LA PW and PVs. Had bout of pericardial symptoms after this did fine with non-steroidals which he is at the end of his course. Further more found to have moderate MR with now mildly compromised LV function and imaging erlinda-procedurally. Here to discuss.    ROS (+ highlighted in red):  General--Negative for fatigue, weight loss or weight gain  Cardiovascular--Negative for CP, orthopnea, PND    Past Medical History:   Diagnosis Date    A-fib (HCC) 06/22/2023    \"will have an ablation in a few weeks\"    AV block 07/01/2015    Status post PPM implantation.    AVNRT (AV hitesh re-entry tachycardia) (HCC)     Bleeding from the nose     Cancer (HCC)     prostate cancer    GERD (gastroesophageal reflux disease)      Heart burn     High cholesterol     Hyperlipemia     Hypertension     Indigestion     Pacemaker     Urinary incontinence        Past Surgical History:   Procedure Laterality Date    STRABISMUS REPAIR Bilateral 8/3/2023    Procedure: BILATERAL MEDIAL RECTUS RECESS, LEFT INFERIOR OBLIQUE TRANSPOSITON;  Surgeon: Etelvina Barbour M.D.;  Location: SURGERY SAME DAY [facility];  Service: Ophthalmology    OTHER  04/2023    prostactectomy    RECOVERY  07/01/2015    Procedure:  CATH LAB  PM INSERT [facility] ICD9: 426.13;  Surgeon: [facility] Surgery;  Location: SURGERY PRE-POST PROC UNIT [facility];  Service:     PACEMAKER INSERTION  07/01/2015    [facility] #[id number] implanted by Dr. Sanchez.    APPENDECTOMY      INGUINAL HERNIA REPAIR " BILATERAL      PILONIDAL CYST EXCISION         Social History     Socioeconomic History    Marital status:      Spouse name: Not on file    Number of children: Not on file    Years of education: Not on file    Highest education level: Not on file   Occupational History    Not on file   Tobacco Use    Smoking status: Never    Smokeless tobacco: Never    Tobacco comments:     Chew was over 35 years ago   Vaping Use    Vaping Use: Never used   Substance and Sexual Activity    Alcohol use: Yes     Alcohol/week: 1.2 oz     Types: 1 Glasses of wine, 1 Cans of beer per week     Comment: LESS THAN WEEKLY    Drug use: No     Types: Inhaled     Comment: smoked MJ in college, none since then.    Sexual activity: Yes     Partners: Female, Male   Other Topics Concern    Not on file   Social History Narrative    Not on file     Social Determinants of Health     Financial Resource Strain: Not on file   Food Insecurity: Not on file   Transportation Needs: Not on file   Physical Activity: Not on file   Stress: Not on file   Social Connections: Not on file   Intimate Partner Violence: Not on file   Housing Stability: Not on file       Family History   Problem Relation Age of Onset    Heart Disease Mother     Diabetes Mother     Cancer Father 86        Pancreatitic       No Known Allergies    Current Outpatient Medications   Medication Sig Dispense Refill    colchicine (COLCRYS) 0.6 MG Tab Take 1 Tablet by mouth every day for 14 days. 14 Tablet 0    metoprolol SR (TOPROL XL) 25 MG TABLET SR 24 HR Take 1 Tablet by mouth every day. 100 Tablet 4    rivaroxaban (XARELTO) 20 MG Tab tablet Take 1 Tablet by mouth with dinner. 30 Tablet 11    simvastatin (ZOCOR) 40 MG Tab Take 1 Tablet by mouth every evening. 90 Tablet 3    lisinopril (PRINIVIL) 10 MG Tab Take 1 Tablet by mouth every evening. 90 Tablet 3    pantoprazole (PROTONIX) 40 MG Tablet Delayed Response Take 40 mg by mouth every evening.      Multiple Vitamins-Minerals (CENTRUM  "SILVER ULTRA MENS PO) Take 1 Tab by mouth every evening.      sucralfate (CARAFATE) 1 GM Tab Take 1 Tablet by mouth 4 Times a Day,Before Meals and at Bedtime for 14 days. 56 Tablet 0     No current facility-administered medications for this visit.       Physical Exam:  Vitals:    09/22/23 1339   BP: 122/88   BP Location: Left arm   Patient Position: Sitting   BP Cuff Size: Adult   Pulse: 81   Resp: 16   SpO2: 94%   Weight: 88 kg (194 lb)   Height: 1.753 m (5' 9\")     General appearance: NAD, conversant  HEENT: PERRL, neck is supple with FROM  Lungs: Clear to auscultation, normal respiratory effort  CV: RRR, no murmurs/rubs/gallops, no JVD  Abdomen: Soft, non-tender with normal bowel sounds  Extremities: No peripheral edema, no clubbing or cyanosis  Skin: No rash, lesions, or ulcers  Psych: Alert and oriented to person, place and time    Data:  Labs reviewed    Prior echo/stress reviewed:  LVEF 45%    EKG interpreted by me:  Sinus, RV paced    Impression/Plan:  1. Persistent atrial fibrillation (HCC)  EKG        -Doing better from pericardial standpoint  -I do not think that chronic RV pacing is not helping his LV function, particularly as it is now depressed and that he may benefit if we can provide synchronized pacing for him  -Unfortunately we were in the dark about compromised LV function at the time of his generator change earlier this year or we would have provided CRT at that point  -Still his EF is now mildly compromised and requiring chronic ventricular pacing CRT upgrade reasonable  -Risks/benefits discussed and he is agreeable to proceed  -Will schedule next availabe    Alda Wisnlow MD    "

## 2023-09-25 ENCOUNTER — TELEPHONE (OUTPATIENT)
Dept: CARDIOLOGY | Facility: MEDICAL CENTER | Age: 73
End: 2023-09-25
Payer: MEDICARE

## 2023-09-25 NOTE — TELEPHONE ENCOUNTER
----- Message from Alda Winslow M.D. sent at 9/22/2023  2:16 PM PDT -----  Let's schedule upgrade to BiV for his pacemaker next available.

## 2023-09-25 NOTE — TELEPHONE ENCOUNTER
Spoke with patient's wife - patient scheduled for upgrade to BiV PM on 10-16-23 with Dr. Winslow. Patient has been instructed to check in at 6:00 for 7:30 case time. Message sent to authorizations. Emely with [facility] notified.

## 2023-10-04 ENCOUNTER — APPOINTMENT (OUTPATIENT)
Dept: ADMISSIONS | Facility: MEDICAL CENTER | Age: 73
End: 2023-10-04
Attending: INTERNAL MEDICINE
Payer: MEDICARE

## 2023-10-09 ENCOUNTER — PRE-ADMISSION TESTING (OUTPATIENT)
Dept: ADMISSIONS | Facility: MEDICAL CENTER | Age: 73
End: 2023-10-09
Attending: INTERNAL MEDICINE
Payer: MEDICARE

## 2023-10-16 ENCOUNTER — HOSPITAL ENCOUNTER (OUTPATIENT)
Facility: MEDICAL CENTER | Age: 73
End: 2023-10-16
Attending: INTERNAL MEDICINE | Admitting: INTERNAL MEDICINE
Payer: MEDICARE

## 2023-10-16 ENCOUNTER — APPOINTMENT (OUTPATIENT)
Dept: CARDIOLOGY | Facility: MEDICAL CENTER | Age: 73
End: 2023-10-16
Attending: INTERNAL MEDICINE
Payer: MEDICARE

## 2023-10-16 ENCOUNTER — APPOINTMENT (OUTPATIENT)
Dept: RADIOLOGY | Facility: MEDICAL CENTER | Age: 73
End: 2023-10-16
Attending: INTERNAL MEDICINE
Payer: MEDICARE

## 2023-10-16 VITALS
RESPIRATION RATE: 18 BRPM | DIASTOLIC BLOOD PRESSURE: 94 MMHG | TEMPERATURE: 97.6 F | HEART RATE: 74 BPM | SYSTOLIC BLOOD PRESSURE: 139 MMHG | BODY MASS INDEX: 28.65 KG/M2 | OXYGEN SATURATION: 91 % | WEIGHT: 194 LBS

## 2023-10-16 DIAGNOSIS — I48.19 PERSISTENT ATRIAL FIBRILLATION (HCC): ICD-10-CM

## 2023-10-16 DIAGNOSIS — I50.22 CHRONIC SYSTOLIC CHF (CONGESTIVE HEART FAILURE), NYHA CLASS 2 (HCC): ICD-10-CM

## 2023-10-16 LAB
ALBUMIN SERPL BCP-MCNC: 4.2 G/DL (ref 3.2–4.9)
ALBUMIN/GLOB SERPL: 1.3 G/DL
ALP SERPL-CCNC: 92 U/L (ref 30–99)
ALT SERPL-CCNC: 20 U/L (ref 2–50)
ANION GAP SERPL CALC-SCNC: 11 MMOL/L (ref 7–16)
AST SERPL-CCNC: 20 U/L (ref 12–45)
BILIRUB SERPL-MCNC: 1.4 MG/DL (ref 0.1–1.5)
BUN SERPL-MCNC: 16 MG/DL (ref 8–22)
CALCIUM ALBUM COR SERPL-MCNC: 9.4 MG/DL (ref 8.5–10.5)
CALCIUM SERPL-MCNC: 9.6 MG/DL (ref 8.5–10.5)
CHLORIDE SERPL-SCNC: 104 MMOL/L (ref 96–112)
CO2 SERPL-SCNC: 22 MMOL/L (ref 20–33)
CREAT SERPL-MCNC: 0.85 MG/DL (ref 0.5–1.4)
EKG IMPRESSION: NORMAL
EKG IMPRESSION: NORMAL
ERYTHROCYTE [DISTWIDTH] IN BLOOD BY AUTOMATED COUNT: 43.8 FL (ref 35.9–50)
GFR SERPLBLD CREATININE-BSD FMLA CKD-EPI: 90 ML/MIN/1.73 M 2
GLOBULIN SER CALC-MCNC: 3.3 G/DL (ref 1.9–3.5)
GLUCOSE SERPL-MCNC: 123 MG/DL (ref 65–99)
HCT VFR BLD AUTO: 43.6 % (ref 42–52)
HGB BLD-MCNC: 15.5 G/DL (ref 14–18)
INR PPP: 2 (ref 0.87–1.13)
MCH RBC QN AUTO: 32.3 PG (ref 27–33)
MCHC RBC AUTO-ENTMCNC: 35.6 G/DL (ref 32.3–36.5)
MCV RBC AUTO: 90.8 FL (ref 81.4–97.8)
PLATELET # BLD AUTO: 236 K/UL (ref 164–446)
PMV BLD AUTO: 10 FL (ref 9–12.9)
POTASSIUM SERPL-SCNC: 4.1 MMOL/L (ref 3.6–5.5)
PROT SERPL-MCNC: 7.5 G/DL (ref 6–8.2)
PROTHROMBIN TIME: 23 SEC (ref 12–14.6)
RBC # BLD AUTO: 4.8 M/UL (ref 4.7–6.1)
SODIUM SERPL-SCNC: 137 MMOL/L (ref 135–145)
WBC # BLD AUTO: 11.8 K/UL (ref 4.8–10.8)

## 2023-10-16 PROCEDURE — 160046 HCHG PACU - 1ST 60 MINS PHASE II

## 2023-10-16 PROCEDURE — 93005 ELECTROCARDIOGRAM TRACING: CPT | Performed by: INTERNAL MEDICINE

## 2023-10-16 PROCEDURE — 80053 COMPREHEN METABOLIC PANEL: CPT

## 2023-10-16 PROCEDURE — 700101 HCHG RX REV CODE 250

## 2023-10-16 PROCEDURE — 33229 REMV&REPLC PM GEN MULT LEADS: CPT | Performed by: INTERNAL MEDICINE

## 2023-10-16 PROCEDURE — 160002 HCHG RECOVERY MINUTES (STAT)

## 2023-10-16 PROCEDURE — 71045 X-RAY EXAM CHEST 1 VIEW: CPT

## 2023-10-16 PROCEDURE — 700111 HCHG RX REV CODE 636 W/ 250 OVERRIDE (IP): Mod: JZ

## 2023-10-16 PROCEDURE — 160035 HCHG PACU - 1ST 60 MINS PHASE I

## 2023-10-16 PROCEDURE — 700117 HCHG RX CONTRAST REV CODE 255: Performed by: INTERNAL MEDICINE

## 2023-10-16 PROCEDURE — 33225 L VENTRIC PACING LEAD ADD-ON: CPT | Performed by: INTERNAL MEDICINE

## 2023-10-16 PROCEDURE — 160036 HCHG PACU - EA ADDL 30 MINS PHASE I

## 2023-10-16 PROCEDURE — 33224 INSERT PACING LEAD & CONNECT: CPT

## 2023-10-16 PROCEDURE — 99152 MOD SED SAME PHYS/QHP 5/>YRS: CPT | Performed by: INTERNAL MEDICINE

## 2023-10-16 PROCEDURE — 71046 X-RAY EXAM CHEST 2 VIEWS: CPT

## 2023-10-16 PROCEDURE — 93010 ELECTROCARDIOGRAM REPORT: CPT | Performed by: INTERNAL MEDICINE

## 2023-10-16 PROCEDURE — 85610 PROTHROMBIN TIME: CPT

## 2023-10-16 PROCEDURE — 85027 COMPLETE CBC AUTOMATED: CPT

## 2023-10-16 RX ORDER — MIDAZOLAM HYDROCHLORIDE 1 MG/ML
INJECTION INTRAMUSCULAR; INTRAVENOUS
Status: COMPLETED
Start: 2023-10-16 | End: 2023-10-16

## 2023-10-16 RX ORDER — ACETAMINOPHEN 325 MG/1
650 TABLET ORAL EVERY 4 HOURS PRN
Status: DISCONTINUED | OUTPATIENT
Start: 2023-10-16 | End: 2023-10-16 | Stop reason: HOSPADM

## 2023-10-16 RX ORDER — CEFAZOLIN SODIUM 1 G/3ML
INJECTION, POWDER, FOR SOLUTION INTRAMUSCULAR; INTRAVENOUS
Status: COMPLETED
Start: 2023-10-16 | End: 2023-10-16

## 2023-10-16 RX ORDER — BUPIVACAINE HYDROCHLORIDE 5 MG/ML
INJECTION, SOLUTION EPIDURAL; INTRACAUDAL
Status: COMPLETED
Start: 2023-10-16 | End: 2023-10-16

## 2023-10-16 RX ORDER — LIDOCAINE HYDROCHLORIDE 20 MG/ML
INJECTION, SOLUTION INFILTRATION; PERINEURAL
Status: COMPLETED
Start: 2023-10-16 | End: 2023-10-16

## 2023-10-16 RX ORDER — ONDANSETRON 2 MG/ML
4 INJECTION INTRAMUSCULAR; INTRAVENOUS EVERY 6 HOURS PRN
Status: DISCONTINUED | OUTPATIENT
Start: 2023-10-16 | End: 2023-10-16 | Stop reason: HOSPADM

## 2023-10-16 RX ADMIN — MIDAZOLAM 2 MG: 1 INJECTION, SOLUTION INTRAMUSCULAR; INTRAVENOUS at 08:43

## 2023-10-16 RX ADMIN — IOHEXOL 22 ML: 350 INJECTION, SOLUTION INTRAVENOUS at 08:24

## 2023-10-16 RX ADMIN — FENTANYL CITRATE 100 MCG: 50 INJECTION, SOLUTION INTRAMUSCULAR; INTRAVENOUS at 08:22

## 2023-10-16 RX ADMIN — CEFAZOLIN 1000 MG: 1 INJECTION, POWDER, FOR SOLUTION INTRAMUSCULAR; INTRAVENOUS at 08:22

## 2023-10-16 RX ADMIN — CEFAZOLIN 2000 MG: 1 INJECTION, POWDER, FOR SOLUTION INTRAMUSCULAR; INTRAVENOUS at 08:24

## 2023-10-16 RX ADMIN — MIDAZOLAM 2 MG: 1 INJECTION, SOLUTION INTRAMUSCULAR; INTRAVENOUS at 08:22

## 2023-10-16 RX ADMIN — BUPIVACAINE HYDROCHLORIDE: 5 INJECTION, SOLUTION EPIDURAL; INTRACAUDAL at 08:22

## 2023-10-16 RX ADMIN — LIDOCAINE HYDROCHLORIDE: 20 INJECTION, SOLUTION INFILTRATION; PERINEURAL at 08:22

## 2023-10-16 ASSESSMENT — FIBROSIS 4 INDEX: FIB4 SCORE: 1.42

## 2023-10-16 ASSESSMENT — PAIN DESCRIPTION - PAIN TYPE
TYPE: SURGICAL PAIN

## 2023-10-16 NOTE — OR NURSING
0929 Report received from Abbi HELLER. Patient arousable. Denies pain, denies nausea. Left chest site dressing clean dry intact and soft. Vss.     0946 Family at the bedside updated plan of care.    0957 Pacemaker interrogation completed    1000 post EKG completed.     1017 Post cxr completed     1021 Discharge instructions given to patient and family verbalize understanding of the orders. Copy of instructions given to patients wife.     1041 cxr showing hemithorax called and notified Charge nurse RN. Patient on 10L non-re breather vss    1045 2 view cxr ordered.     1053 patient to x-ray monitored.     1108 updated DASHA lau via text on patients condition.    1110 patient back from x-ray department . Connected to monitors. Denies pain, denies nausea. Vss.     1207 DASHA Henry for post assessment. Patient cleared for discharge.     1226 Patient escorted via w/c to responsible adult with all personal belongings.

## 2023-10-16 NOTE — DISCHARGE INSTRUCTIONS
What to Expect Post Anesthesia    Rest and take it easy for the first 24 hours.  A responsible adult is recommended to remain with you during that time.  It is normal to feel sleepy.  We encourage you to not do anything that requires balance, judgment or coordination.    FOR 24 HOURS DO NOT:  Drive, operate machinery or run household appliances.  Drink beer or alcoholic beverages.  Make important decisions or sign legal documents.    To avoid nausea, slowly advance diet as tolerated, avoiding spicy or greasy foods for the first day.  Add more substantial food to your diet according to your provider's instructions.  Babies can be fed formula or breast milk as soon as they are hungry.  INCREASE FLUIDS AND FIBER TO AVOID CONSTIPATION.    MILD FLU-LIKE SYMPTOMS ARE NORMAL.  YOU MAY EXPERIENCE GENERALIZED MUSCLE ACHES, THROAT IRRITATION, HEADACHE AND/OR SOME NAUSEA.    If any questions arise, call your provider.  If your provider is not available, please feel free to call the [facility] at [phone]    Pacemaker Battery Change, Care After  Do not raise affected arm above shoulder level for 6 weeks. Avoid excessive     pushing, pulling or lifting for 6 weeks. May use walker or cane for balance. Do not     place cell phones or mobile devices directly over implanted device.    Maintain surgical dressing x 7 days, No shower. Sponge bath only x 7 days                            This sheet gives you information about how to care for yourself after your procedure. Your health care provider may also give you more specific instructions. If you have problems or questions, contact your health care provider.  What can I expect after the procedure?  After the procedure, it is common to have these symptoms at the site where the pacemaker was inserted:  Mild pain or soreness.  Slight bruising.  Some swelling over the incisions.  A slight bump over the skin where the device was placed (if it was implanted in the upper chest  area). Sometimes, it is possible to feel the device under the skin. This is normal.  Follow these instructions at home:  Incision care    Keep the incision clean and dry for 2-3 days after the procedure or as told by your health care provider. It takes several weeks for the incision site to completely heal.  Do not remove the bandage (dressing) on your chest until told to do so by your health care provider.  Leave stitches (sutures), skin glue, or adhesive strips in place. These skin closures may need to stay in place for 2 weeks or longer. If adhesive strip edges start to loosen and curl up, you may trim the loose edges. Do not remove adhesive strips completely unless your health care provider tells you to do that.  Do not take baths, swim, or use a hot tub for 7-10 days or until your health care provider approves. Ask your health care provider if you may take showers. You may only be allowed to take sponge baths.  Pat the incision area dry with a clean towel. Do not rub the area. This may cause bleeding.  Check your incision area every day for signs of infection. Check for:  More redness, swelling, or pain.  Fluid or blood.  Warmth.  Pus or a bad smell.  Avoid putting pressure on the area where the pacemaker was placed. Women may want to place a small pad over the incision site to protect it from their bra strap.  Medicines  Take over-the-counter and prescription medicines only as told by your health care provider.  If you were prescribed an antibiotic medicine, take it as told by your health care provider. Do not stop taking the antibiotic even if you start to feel better.  Activity  For the first 2 weeks, or as long as told by your health care provider:  Avoid lifting your left arm higher than your shoulder.  Be gentle when you move your arms over your head. It is okay to raise your arm to comb your hair.  Avoid exercise or activities that take a lot of effort.  Ask your health care provider when it is okay  to:  Return to your normal activities.  Return to work or school.  Resume sexual activity.  If you were given a medicine to help you relax (sedative) during the procedure, it can affect you for several hours. Do not drive or operate machinery until your health care provider says that it is safe.  General instructions  Do not use any products that contain nicotine or tobacco, such as cigarettes, e-cigarettes, and chewing tobacco. These can delay incision healing after surgery. If you need help quitting, ask your health care provider.  Always let all health care providers, including dentists, know about your pacemaker before you have any medical procedures or tests.  You may be shown how to transfer data from your pacemaker through the phone to your health care provider.  Wear a medical ID bracelet or necklace stating that you have a pacemaker, and carry a pacemaker ID card with you at all times.  Avoid close and prolonged exposure to electrical devices that have strong magnetic fields. These include:  Airport security checkpoints. When at the airport, let officials know that you have a pacemaker. Carry your pacemaker ID card.  Metal detectors. If you must pass through a metal detector, walk through it quickly. Do not stop under the detector or stand near it.  When using your mobile phone, hold it to the ear opposite the pacemaker. Do not leave your mobile phone in a pocket over the pacemaker.  Your pacemaker battery will last for 5-15 years. Your health care provider will do routine checks to know when the battery is starting to run down. When this happens, the pacemaker will need to be replaced.  Keep all follow-up visits as told by your health care provider. This is important.  Contact a health care provider if:  You have pain at the incision site that is not relieved by medicines.  You have any of these signs of infection:  More redness, swelling, or pain around your incision.  Fluid or blood coming from your  incision.  Warmth coming from your incision.  Pus or a bad smell coming from your incision.  A fever.  You feel brief, occasional palpitations, light-headedness, or any symptoms that you think might be related to your heart.  Get help right away if:  You have chest pain that is different from the pain at the pacemaker site.  You develop a red streak that extends above or below the incision site.  You have shortness of breath.  You have palpitations or an irregular heartbeat.  You have light-headedness that does not go away quickly.  You faint or have dizzy spells.  Your pulse suddenly drops or increases rapidly and does not return to normal.  You gain weight and your legs and ankles swell.  Summary  After the procedure, it is common to have pain, soreness, and some swelling or bruising where the pacemaker was inserted.  Keep your incision clean and dry. Follow instructions from your health care provider about how to take care of your incision.  Check your incision every day for signs of infection, such as more pain or swelling, pus or a bad smell, warmth, or leaking fluid or blood.  Carry a pacemaker ID card with you at all times.  This information is not intended to replace advice given to you by your health care provider. Make sure you discuss any questions you have with your health care provider.  Document Revised: 11/19/2020 Document Reviewed: 11/19/2020  Elsevier Patient Education © 2023 [employer]  .    MEDICATIONS: Resume taking daily medication.  Take prescribed pain medication with food.  If no medication is prescribed, you may take non-aspirin pain medication if needed.  PAIN MEDICATION CAN BE VERY CONSTIPATING.  Take a stool softener or laxative such as senokot, pericolace, or milk of magnesia if needed.    Last pain medication given at

## 2023-10-16 NOTE — OP REPORT
"[facility]    PROCEDURE(S) PERFORMED:   1) Permanent Pacemaker Generator change (changed to CRT-P device)  2) Insertion of CS lead for LV pacing    DATE OF SERVICE: 10/16/2023    : Alda Winslow MD    ASSISTANT: None    ANESTHESIA: Local and moderate sedation (start time 0806, stop time 0903, total dose given 4 mg IV versed, 100 mcg IV fentanyl)    EBL: 30 cc    SPECIMENS: None    INDICATION(S):  Complete heart block  Chronic systolic CHF    DESCRIPTION OF PROCEDURE:  After informed written consent, the patient was brought to the electrophysiology lab in the fasting, unsedated state. The patient was prepped and draped in the usual sterile fashion. The procedure was performed under moderate sedation with local anesthetic. A left upper extremity venogram was performed, demonstrating a patent subclavian vein. A left infraclavicular incision was made with a scalpel and the pectoral device pocket was created using a combination of blunt dissection and electrocautery. The old generator and leads were freed from adhesions and the generator taken out of the pocket, out of the way. The modified Seldinger technique was used to gain access to the left axillary vein. A 10 Fr peel-away hemostasis sheath was placed in the vein. A multipurpose CS sheath and an 0.035\" glidewire was used to access the coronary sinus. A venogram was performed demonstrating a single posterolateral branch. A quadrapolar lead was advanced to the distal portion of the branch over 0.014\" Whisper EDS coronary wire. The leads were tested and had satisfactory pacing parameters. High output ventricular pacing did  produce extracardiac stimulation at max output but there was significant room without stimulation under threshold. The lead weas sutured to the underlying pectoral muscle with interrupted silk over a silastic suture sleeve after we slit the delivery sheaths. The device pocket was irrigated with antibiotic solution, inspected, and no " bleeding was seen. The old generator was disconnected from the old leads. The leads were connected to the new pacemaker pulse generator and the device was inserted into the pocket along with a Tyrex antibiotic sleeve. The wound was closed with three layers of absorbable sutures. Following recovery from sedation, the patient was transferred to a monitored bed in good condition.     EXPLANTED DEVICE INFORMATION:  Mccoy [id number], serial number [id number]    IMPLANTED DEVICE INFORMATION:  Pulse generator is a Digital Vault model [id number]  Serial # [id number]    LEAD INFORMATION:  1)Right atrial lead is a Abbott model #1688, serial #[id number] ,P wave 5.0 millivolts, threshold 0.75 Volts at 0.5 milliseconds, pacing impedance 450 Ohms.    2)Right ventricular lead is a Mccoy model #ZS8264 , serial #[id number] ,R wave >12.0 millivolts, threshold 1.0 Volts at 0.5 milliseconds, pacing impedance 450 Ohms.    3) New coronary sinus lead is Abbott 1458, serial number [id number], threshold 3.0 V at 0.4 ms, impedance 710 Ohms    DEVICE PROGRAMMING:  DDDR 60 -120 ppm    FLUOROSCOPY TIME: 4.2 minutes    IMPRESSIONS:  1. Successful generator change to CRT pacemaker  2. Successful insertion of CS lead    RECOMMENDATIONS:  1. Transfer to monitored bed  2. Chest x-ray  3. Device interrogation prior to hospital discharge  4. Followup in device clinic

## 2023-10-23 ENCOUNTER — NON-PROVIDER VISIT (OUTPATIENT)
Dept: CARDIOLOGY | Facility: MEDICAL CENTER | Age: 73
End: 2023-10-23
Attending: INTERNAL MEDICINE
Payer: MEDICARE

## 2023-10-23 ENCOUNTER — NON-PROVIDER VISIT (OUTPATIENT)
Dept: CARDIOLOGY | Facility: MEDICAL CENTER | Age: 73
End: 2023-10-23

## 2023-10-23 DIAGNOSIS — I44.1 SECOND DEGREE AV BLOCK: ICD-10-CM

## 2023-10-23 DIAGNOSIS — I47.19 AVNRT (AV NODAL RE-ENTRY TACHYCARDIA) (HCC): ICD-10-CM

## 2023-10-23 DIAGNOSIS — Z95.0 PRESENCE OF CARDIAC RESYNCHRONIZATION THERAPY PACEMAKER (CRT-P): ICD-10-CM

## 2023-10-23 PROCEDURE — 93281 PM DEVICE PROGR EVAL MULTI: CPT | Mod: 26 | Performed by: INTERNAL MEDICINE

## 2023-10-23 PROCEDURE — 93281 PM DEVICE PROGR EVAL MULTI: CPT | Performed by: INTERNAL MEDICINE

## 2023-10-23 NOTE — CARDIAC REMOTE MONITOR - SCAN
Device transmission reviewed. Device demonstrated appropriate function.       Electronically Signed by: Alda Winslow M.D.    11/18/2023  11:42 AM

## 2023-11-01 ENCOUNTER — OFFICE VISIT (OUTPATIENT)
Dept: CARDIOLOGY | Facility: MEDICAL CENTER | Age: 73
End: 2023-11-01
Attending: INTERNAL MEDICINE
Payer: MEDICARE

## 2023-11-01 VITALS
DIASTOLIC BLOOD PRESSURE: 80 MMHG | OXYGEN SATURATION: 95 % | HEIGHT: 69 IN | RESPIRATION RATE: 16 BRPM | SYSTOLIC BLOOD PRESSURE: 118 MMHG | HEART RATE: 82 BPM | WEIGHT: 195 LBS | BODY MASS INDEX: 28.88 KG/M2

## 2023-11-01 DIAGNOSIS — Z95.0 PRESENCE OF BIVENTRICULAR CARDIAC PACEMAKER: ICD-10-CM

## 2023-11-01 DIAGNOSIS — I48.0 PAROXYSMAL ATRIAL FIBRILLATION (HCC): ICD-10-CM

## 2023-11-01 PROCEDURE — 93010 ELECTROCARDIOGRAM REPORT: CPT | Mod: 59 | Performed by: INTERNAL MEDICINE

## 2023-11-01 PROCEDURE — 99024 POSTOP FOLLOW-UP VISIT: CPT | Mod: 25 | Performed by: INTERNAL MEDICINE

## 2023-11-01 PROCEDURE — 99212 OFFICE O/P EST SF 10 MIN: CPT | Performed by: INTERNAL MEDICINE

## 2023-11-01 PROCEDURE — 93281 PM DEVICE PROGR EVAL MULTI: CPT | Performed by: INTERNAL MEDICINE

## 2023-11-01 PROCEDURE — 93005 ELECTROCARDIOGRAM TRACING: CPT | Performed by: INTERNAL MEDICINE

## 2023-11-01 PROCEDURE — 93281 PM DEVICE PROGR EVAL MULTI: CPT | Mod: 26 | Performed by: INTERNAL MEDICINE

## 2023-11-01 PROCEDURE — 99213 OFFICE O/P EST LOW 20 MIN: CPT | Performed by: INTERNAL MEDICINE

## 2023-11-01 PROCEDURE — 3079F DIAST BP 80-89 MM HG: CPT | Performed by: INTERNAL MEDICINE

## 2023-11-01 PROCEDURE — 3074F SYST BP LT 130 MM HG: CPT | Performed by: INTERNAL MEDICINE

## 2023-11-01 ASSESSMENT — FIBROSIS 4 INDEX: FIB4 SCORE: 1.42

## 2023-11-01 NOTE — PROGRESS NOTES
"Arrhythmia Clinic Note (Established patient)    DOS: 11/1/2023    Chief complaint/Reason for consult: F/u CRT-P upgrade    Interval History:  Pt is a 72 yo M. History of remote AVNRT ablation complicated by heart block s/p PPM and persistent AF. I met him when he was scheduled for gen change. We have since done AF ablation, and then with chronic pacing and decreasing LV function, upgraded his PPM to CRT-P. He says he is feeling more energy with the BiV pacing.    ROS (+ highlighted in red):  General--Negative for fatigue, weight loss or weight gain  Cardiovascular--Negative for CP, orthopnea, PND    Past Medical History:   Diagnosis Date    A-fib (HCC) 06/22/2023    \"will have an ablation in a few weeks\"    AV block 07/01/2015    Status post PPM implantation.    AVNRT (AV hitesh re-entry tachycardia)     Bleeding from the nose     Cancer (HCC)     prostate cancer    GERD (gastroesophageal reflux disease)      Heart burn     High cholesterol     Hyperlipemia     Hypertension     Indigestion     Pacemaker     Urinary incontinence        Past Surgical History:   Procedure Laterality Date    STRABISMUS REPAIR Bilateral 8/3/2023    Procedure: BILATERAL MEDIAL RECTUS RECESS, LEFT INFERIOR OBLIQUE TRANSPOSITON;  Surgeon: Etelvina Barbour M.D.;  Location: SURGERY SAME DAY [facility];  Service: Ophthalmology    OTHER  04/2023    prostactectomy    RECOVERY  07/01/2015    Procedure:  CATH LAB  PM INSERT [facility] ICD9: 426.13;  Surgeon: Recoveryonly Surgery;  Location: SURGERY PRE-POST PROC UNIT [facility];  Service:     PACEMAKER INSERTION  07/01/2015    [facility] #[id number] implanted by Dr. Sanchez.    APPENDECTOMY      INGUINAL HERNIA REPAIR BILATERAL      PILONIDAL CYST EXCISION         Social History     Socioeconomic History    Marital status:      Spouse name: Not on file    Number of children: Not on file    Years of education: Not on file    Highest education level: Not on file   Occupational History    " Not on file   Tobacco Use    Smoking status: Never    Smokeless tobacco: Never    Tobacco comments:     Chew was over 35 years ago   Vaping Use    Vaping Use: Never used   Substance and Sexual Activity    Alcohol use: Yes     Alcohol/week: 1.2 oz     Types: 1 Glasses of wine, 1 Cans of beer per week     Comment: LESS THAN WEEKLY    Drug use: No     Types: Inhaled     Comment: smoked MJ in college, none since then.    Sexual activity: Yes     Partners: Female, Male   Other Topics Concern    Not on file   Social History Narrative    Not on file     Social Determinants of Health     Financial Resource Strain: Not on file   Food Insecurity: Not on file   Transportation Needs: Not on file   Physical Activity: Not on file   Stress: Not on file   Social Connections: Not on file   Intimate Partner Violence: Not on file   Housing Stability: Not on file       Family History   Problem Relation Age of Onset    Heart Disease Mother     Diabetes Mother     Cancer Father 86        Pancreatitic       No Known Allergies    Current Outpatient Medications   Medication Sig Dispense Refill    VITAMIN D PO Take  by mouth every day.      Ascorbic Acid (VITAMIN C PO) Take  by mouth every day.      metoprolol SR (TOPROL XL) 25 MG TABLET SR 24 HR Take 1 Tablet by mouth every day. 100 Tablet 4    rivaroxaban (XARELTO) 20 MG Tab tablet Take 1 Tablet by mouth with dinner. 30 Tablet 11    simvastatin (ZOCOR) 40 MG Tab Take 1 Tablet by mouth every evening. 90 Tablet 3    lisinopril (PRINIVIL) 10 MG Tab Take 1 Tablet by mouth every evening. 90 Tablet 3    pantoprazole (PROTONIX) 40 MG Tablet Delayed Response Take 40 mg by mouth every evening.      Multiple Vitamins-Minerals (CENTRUM SILVER ULTRA MENS PO) Take 1 Tab by mouth every evening.       No current facility-administered medications for this visit.       Physical Exam:  Vitals:    11/01/23 1253   BP: 118/80   BP Location: Left arm   Patient Position: Sitting   BP Cuff Size: Adult   Pulse: 82  "  Resp: 16   SpO2: 95%   Weight: 88.5 kg (195 lb)   Height: 1.753 m (5' 9\")     General appearance: NAD, conversant  HEENT: PERRL, neck is supple with FROM  Lungs: Clear to auscultation, normal respiratory effort  CV: RRR, no murmurs/rubs/gallops, no JVD  Abdomen: Soft, non-tender with normal bowel sounds  Extremities: No peripheral edema, no clubbing or cyanosis  Skin: No rash, lesions, or ulcers  Psych: Alert and oriented to person, place and time    Data:  Labs reviewed    Prior echo/stress reviewed:  LVEF ~40%    EKG interpreted by me:  BiV paced    Impression/Plan:  1. Paroxysmal atrial fibrillation (HCC)  EKG    EC-ECHOCARDIOGRAM COMPLETE W/ CONT        -Device check showing appropriate function  -LV lead looks good position and paced QRS looks narrow  -Recheck echo in a month or two  -F/u in 6 mo    Alda Winslow MD    "

## 2023-11-10 ENCOUNTER — HOSPITAL ENCOUNTER (OUTPATIENT)
Facility: MEDICAL CENTER | Age: 73
End: 2023-11-12
Attending: EMERGENCY MEDICINE | Admitting: INTERNAL MEDICINE
Payer: MEDICARE

## 2023-11-10 ENCOUNTER — APPOINTMENT (OUTPATIENT)
Dept: RADIOLOGY | Facility: MEDICAL CENTER | Age: 73
End: 2023-11-10
Attending: EMERGENCY MEDICINE
Payer: MEDICARE

## 2023-11-10 DIAGNOSIS — I30.1 ACUTE VIRAL PERICARDITIS: ICD-10-CM

## 2023-11-10 DIAGNOSIS — I10 ESSENTIAL HYPERTENSION: ICD-10-CM

## 2023-11-10 DIAGNOSIS — E78.5 DYSLIPIDEMIA: ICD-10-CM

## 2023-11-10 DIAGNOSIS — R07.2 PRECORDIAL CHEST PAIN: ICD-10-CM

## 2023-11-10 PROCEDURE — 36415 COLL VENOUS BLD VENIPUNCTURE: CPT

## 2023-11-10 PROCEDURE — 94760 N-INVAS EAR/PLS OXIMETRY 1: CPT

## 2023-11-10 PROCEDURE — 93005 ELECTROCARDIOGRAM TRACING: CPT | Performed by: EMERGENCY MEDICINE

## 2023-11-10 PROCEDURE — 99285 EMERGENCY DEPT VISIT HI MDM: CPT

## 2023-11-10 PROCEDURE — 93005 ELECTROCARDIOGRAM TRACING: CPT

## 2023-11-10 ASSESSMENT — PAIN DESCRIPTION - DESCRIPTORS: DESCRIPTORS: ACHING

## 2023-11-11 ENCOUNTER — APPOINTMENT (OUTPATIENT)
Dept: RADIOLOGY | Facility: MEDICAL CENTER | Age: 73
End: 2023-11-11
Attending: INTERNAL MEDICINE
Payer: MEDICARE

## 2023-11-11 ENCOUNTER — APPOINTMENT (OUTPATIENT)
Dept: CARDIOLOGY | Facility: MEDICAL CENTER | Age: 73
End: 2023-11-11
Attending: INTERNAL MEDICINE
Payer: MEDICARE

## 2023-11-11 PROBLEM — R07.9 CHEST PAIN: Status: ACTIVE | Noted: 2023-11-11

## 2023-11-11 PROBLEM — I30.1 ACUTE VIRAL PERICARDITIS: Status: ACTIVE | Noted: 2023-11-11

## 2023-11-11 LAB
ALBUMIN SERPL BCP-MCNC: 4.1 G/DL (ref 3.2–4.9)
ALBUMIN/GLOB SERPL: 1.4 G/DL
ALP SERPL-CCNC: 89 U/L (ref 30–99)
ALT SERPL-CCNC: 24 U/L (ref 2–50)
ANION GAP SERPL CALC-SCNC: 12 MMOL/L (ref 7–16)
AST SERPL-CCNC: 24 U/L (ref 12–45)
BASOPHILS # BLD AUTO: 0.7 % (ref 0–1.8)
BASOPHILS # BLD: 0.06 K/UL (ref 0–0.12)
BILIRUB SERPL-MCNC: 0.3 MG/DL (ref 0.1–1.5)
BUN SERPL-MCNC: 24 MG/DL (ref 8–22)
CALCIUM ALBUM COR SERPL-MCNC: 8.8 MG/DL (ref 8.5–10.5)
CALCIUM SERPL-MCNC: 8.9 MG/DL (ref 8.4–10.2)
CHLORIDE SERPL-SCNC: 110 MMOL/L (ref 96–112)
CO2 SERPL-SCNC: 23 MMOL/L (ref 20–33)
CREAT SERPL-MCNC: 0.76 MG/DL (ref 0.5–1.4)
CRP SERPL HS-MCNC: <0.3 MG/DL (ref 0–0.75)
D DIMER PPP IA.FEU-MCNC: 0.35 UG/ML (FEU) (ref 0–0.5)
EKG IMPRESSION: NORMAL
EKG IMPRESSION: NORMAL
EOSINOPHIL # BLD AUTO: 0.18 K/UL (ref 0–0.51)
EOSINOPHIL NFR BLD: 2.1 % (ref 0–6.9)
ERYTHROCYTE [DISTWIDTH] IN BLOOD BY AUTOMATED COUNT: 45.1 FL (ref 35.9–50)
ERYTHROCYTE [SEDIMENTATION RATE] IN BLOOD BY WESTERGREN METHOD: 18 MM/HOUR (ref 0–20)
GFR SERPLBLD CREATININE-BSD FMLA CKD-EPI: 93 ML/MIN/1.73 M 2
GLOBULIN SER CALC-MCNC: 2.9 G/DL (ref 1.9–3.5)
GLUCOSE SERPL-MCNC: 100 MG/DL (ref 65–99)
HCT VFR BLD AUTO: 42.5 % (ref 42–52)
HGB BLD-MCNC: 14.3 G/DL (ref 14–18)
IMM GRANULOCYTES # BLD AUTO: 0.06 K/UL (ref 0–0.11)
IMM GRANULOCYTES NFR BLD AUTO: 0.7 % (ref 0–0.9)
LIPASE SERPL-CCNC: 35 U/L (ref 11–82)
LV EJECT FRACT  99904: 55
LYMPHOCYTES # BLD AUTO: 2.29 K/UL (ref 1–4.8)
LYMPHOCYTES NFR BLD: 27.1 % (ref 22–41)
MCH RBC QN AUTO: 31.6 PG (ref 27–33)
MCHC RBC AUTO-ENTMCNC: 33.6 G/DL (ref 32.3–36.5)
MCV RBC AUTO: 93.8 FL (ref 81.4–97.8)
MONOCYTES # BLD AUTO: 0.9 K/UL (ref 0–0.85)
MONOCYTES NFR BLD AUTO: 10.6 % (ref 0–13.4)
NEUTROPHILS # BLD AUTO: 4.97 K/UL (ref 1.82–7.42)
NEUTROPHILS NFR BLD: 58.8 % (ref 44–72)
NRBC # BLD AUTO: 0 K/UL
NRBC BLD-RTO: 0 /100 WBC (ref 0–0.2)
PLATELET # BLD AUTO: 236 K/UL (ref 164–446)
PMV BLD AUTO: 9.1 FL (ref 9–12.9)
POTASSIUM SERPL-SCNC: 3.9 MMOL/L (ref 3.6–5.5)
PROT SERPL-MCNC: 7 G/DL (ref 6–8.2)
RBC # BLD AUTO: 4.53 M/UL (ref 4.7–6.1)
SODIUM SERPL-SCNC: 145 MMOL/L (ref 135–145)
TROPONIN T SERPL-MCNC: 13 NG/L (ref 6–19)
TROPONIN T SERPL-MCNC: 15 NG/L (ref 6–19)
TROPONIN T SERPL-MCNC: 15 NG/L (ref 6–19)
WBC # BLD AUTO: 8.5 K/UL (ref 4.8–10.8)

## 2023-11-11 PROCEDURE — 93005 ELECTROCARDIOGRAM TRACING: CPT | Performed by: INTERNAL MEDICINE

## 2023-11-11 PROCEDURE — 96374 THER/PROPH/DIAG INJ IV PUSH: CPT | Mod: XU

## 2023-11-11 PROCEDURE — 80053 COMPREHEN METABOLIC PANEL: CPT

## 2023-11-11 PROCEDURE — G0378 HOSPITAL OBSERVATION PER HR: HCPCS

## 2023-11-11 PROCEDURE — 85025 COMPLETE CBC W/AUTO DIFF WBC: CPT

## 2023-11-11 PROCEDURE — 84484 ASSAY OF TROPONIN QUANT: CPT | Mod: 91

## 2023-11-11 PROCEDURE — 99214 OFFICE O/P EST MOD 30 MIN: CPT | Mod: 25 | Performed by: INTERNAL MEDICINE

## 2023-11-11 PROCEDURE — 85652 RBC SED RATE AUTOMATED: CPT

## 2023-11-11 PROCEDURE — 78452 HT MUSCLE IMAGE SPECT MULT: CPT | Mod: 26 | Performed by: INTERNAL MEDICINE

## 2023-11-11 PROCEDURE — 99223 1ST HOSP IP/OBS HIGH 75: CPT | Performed by: INTERNAL MEDICINE

## 2023-11-11 PROCEDURE — 700102 HCHG RX REV CODE 250 W/ 637 OVERRIDE(OP): Performed by: INTERNAL MEDICINE

## 2023-11-11 PROCEDURE — A9270 NON-COVERED ITEM OR SERVICE: HCPCS | Performed by: INTERNAL MEDICINE

## 2023-11-11 PROCEDURE — 700111 HCHG RX REV CODE 636 W/ 250 OVERRIDE (IP): Performed by: INTERNAL MEDICINE

## 2023-11-11 PROCEDURE — 93306 TTE W/DOPPLER COMPLETE: CPT

## 2023-11-11 PROCEDURE — 83690 ASSAY OF LIPASE: CPT

## 2023-11-11 PROCEDURE — 71045 X-RAY EXAM CHEST 1 VIEW: CPT

## 2023-11-11 PROCEDURE — 94760 N-INVAS EAR/PLS OXIMETRY 1: CPT

## 2023-11-11 PROCEDURE — 78452 HT MUSCLE IMAGE SPECT MULT: CPT

## 2023-11-11 PROCEDURE — 85379 FIBRIN DEGRADATION QUANT: CPT

## 2023-11-11 PROCEDURE — 93018 CV STRESS TEST I&R ONLY: CPT | Performed by: INTERNAL MEDICINE

## 2023-11-11 PROCEDURE — 86140 C-REACTIVE PROTEIN: CPT

## 2023-11-11 PROCEDURE — 36415 COLL VENOUS BLD VENIPUNCTURE: CPT

## 2023-11-11 PROCEDURE — 93306 TTE W/DOPPLER COMPLETE: CPT | Mod: 26 | Performed by: INTERNAL MEDICINE

## 2023-11-11 RX ORDER — MORPHINE SULFATE 4 MG/ML
4 INJECTION INTRAVENOUS
Status: DISCONTINUED | OUTPATIENT
Start: 2023-11-11 | End: 2023-11-12 | Stop reason: HOSPADM

## 2023-11-11 RX ORDER — LISINOPRIL 5 MG/1
10 TABLET ORAL EVERY EVENING
Status: DISCONTINUED | OUTPATIENT
Start: 2023-11-11 | End: 2023-11-12 | Stop reason: HOSPADM

## 2023-11-11 RX ORDER — REGADENOSON 0.08 MG/ML
0.4 INJECTION, SOLUTION INTRAVENOUS ONCE
Status: COMPLETED | OUTPATIENT
Start: 2023-11-11 | End: 2023-11-11

## 2023-11-11 RX ORDER — COLCHICINE 0.6 MG/1
0.6 TABLET ORAL DAILY
Qty: 30 TABLET | Refills: 1 | Status: SHIPPED | OUTPATIENT
Start: 2023-11-11 | End: 2023-11-11 | Stop reason: SDUPTHER

## 2023-11-11 RX ORDER — PANTOPRAZOLE SODIUM 40 MG/1
40 TABLET, DELAYED RELEASE ORAL EVERY EVENING
Status: DISCONTINUED | OUTPATIENT
Start: 2023-11-11 | End: 2023-11-11

## 2023-11-11 RX ORDER — POLYETHYLENE GLYCOL 3350 17 G/17G
1 POWDER, FOR SOLUTION ORAL
Status: DISCONTINUED | OUTPATIENT
Start: 2023-11-11 | End: 2023-11-12 | Stop reason: HOSPADM

## 2023-11-11 RX ORDER — COLCHICINE 0.6 MG/1
0.6 TABLET ORAL 2 TIMES DAILY
Status: DISCONTINUED | OUTPATIENT
Start: 2023-11-11 | End: 2023-11-12 | Stop reason: HOSPADM

## 2023-11-11 RX ORDER — AMOXICILLIN 250 MG
2 CAPSULE ORAL 2 TIMES DAILY
Status: DISCONTINUED | OUTPATIENT
Start: 2023-11-11 | End: 2023-11-12 | Stop reason: HOSPADM

## 2023-11-11 RX ORDER — METOPROLOL SUCCINATE 25 MG/1
25 TABLET, EXTENDED RELEASE ORAL DAILY
Status: DISCONTINUED | OUTPATIENT
Start: 2023-11-11 | End: 2023-11-12 | Stop reason: HOSPADM

## 2023-11-11 RX ORDER — OMEPRAZOLE 20 MG/1
20 CAPSULE, DELAYED RELEASE ORAL DAILY
Status: DISCONTINUED | OUTPATIENT
Start: 2023-11-11 | End: 2023-11-12 | Stop reason: HOSPADM

## 2023-11-11 RX ORDER — ACETAMINOPHEN 325 MG/1
650 TABLET ORAL EVERY 6 HOURS PRN
Status: DISCONTINUED | OUTPATIENT
Start: 2023-11-11 | End: 2023-11-12 | Stop reason: HOSPADM

## 2023-11-11 RX ORDER — NITROGLYCERIN 0.4 MG/1
0.4 TABLET SUBLINGUAL
Status: COMPLETED | OUTPATIENT
Start: 2023-11-11 | End: 2023-11-11

## 2023-11-11 RX ORDER — BISACODYL 10 MG
10 SUPPOSITORY, RECTAL RECTAL
Status: DISCONTINUED | OUTPATIENT
Start: 2023-11-11 | End: 2023-11-12 | Stop reason: HOSPADM

## 2023-11-11 RX ORDER — AMINOPHYLLINE 25 MG/ML
100 INJECTION, SOLUTION INTRAVENOUS
Status: DISCONTINUED | OUTPATIENT
Start: 2023-11-11 | End: 2023-11-12 | Stop reason: HOSPADM

## 2023-11-11 RX ORDER — ONDANSETRON 2 MG/ML
4 INJECTION INTRAMUSCULAR; INTRAVENOUS EVERY 4 HOURS PRN
Status: DISCONTINUED | OUTPATIENT
Start: 2023-11-11 | End: 2023-11-12 | Stop reason: HOSPADM

## 2023-11-11 RX ORDER — LABETALOL HYDROCHLORIDE 5 MG/ML
10 INJECTION, SOLUTION INTRAVENOUS EVERY 4 HOURS PRN
Status: DISCONTINUED | OUTPATIENT
Start: 2023-11-11 | End: 2023-11-12 | Stop reason: HOSPADM

## 2023-11-11 RX ORDER — COLCHICINE 0.6 MG/1
0.6 TABLET ORAL 2 TIMES DAILY
Qty: 30 TABLET | Refills: 1 | Status: SHIPPED | OUTPATIENT
Start: 2023-11-11 | End: 2023-12-11

## 2023-11-11 RX ORDER — SIMVASTATIN 20 MG
40 TABLET ORAL EVERY EVENING
Status: DISCONTINUED | OUTPATIENT
Start: 2023-11-11 | End: 2023-11-12 | Stop reason: HOSPADM

## 2023-11-11 RX ORDER — ONDANSETRON 4 MG/1
4 TABLET, ORALLY DISINTEGRATING ORAL EVERY 4 HOURS PRN
Status: DISCONTINUED | OUTPATIENT
Start: 2023-11-11 | End: 2023-11-12 | Stop reason: HOSPADM

## 2023-11-11 RX ADMIN — SENNOSIDES AND DOCUSATE SODIUM 2 TABLET: 50; 8.6 TABLET ORAL at 05:45

## 2023-11-11 RX ADMIN — NITROGLYCERIN 0.4 MG: 0.4 TABLET, ORALLY DISINTEGRATING SUBLINGUAL at 02:50

## 2023-11-11 RX ADMIN — SIMVASTATIN 40 MG: 20 TABLET, FILM COATED ORAL at 18:00

## 2023-11-11 RX ADMIN — COLCHICINE 0.6 MG: 0.6 TABLET ORAL at 18:01

## 2023-11-11 RX ADMIN — SENNOSIDES AND DOCUSATE SODIUM 2 TABLET: 50; 8.6 TABLET ORAL at 18:01

## 2023-11-11 RX ADMIN — NITROGLYCERIN 0.4 MG: 0.4 TABLET, ORALLY DISINTEGRATING SUBLINGUAL at 09:56

## 2023-11-11 RX ADMIN — OMEPRAZOLE 20 MG: 20 CAPSULE, DELAYED RELEASE ORAL at 18:00

## 2023-11-11 RX ADMIN — MORPHINE SULFATE 4 MG: 4 INJECTION INTRAVENOUS at 06:57

## 2023-11-11 RX ADMIN — RIVAROXABAN 20 MG: 20 TABLET, FILM COATED ORAL at 18:01

## 2023-11-11 RX ADMIN — REGADENOSON 0.4 MG: 0.08 INJECTION, SOLUTION INTRAVENOUS at 14:41

## 2023-11-11 RX ADMIN — ONDANSETRON 4 MG: 4 TABLET, ORALLY DISINTEGRATING ORAL at 03:02

## 2023-11-11 RX ADMIN — NITROGLYCERIN 0.4 MG: 0.4 TABLET, ORALLY DISINTEGRATING SUBLINGUAL at 02:56

## 2023-11-11 RX ADMIN — LISINOPRIL 10 MG: 5 TABLET ORAL at 18:01

## 2023-11-11 ASSESSMENT — ENCOUNTER SYMPTOMS
PALPITATIONS: 0
FALLS: 0
COUGH: 0
LOSS OF CONSCIOUSNESS: 0
HEADACHES: 0
CONSTIPATION: 0
MYALGIAS: 0
NAUSEA: 0
DIZZINESS: 0
ABDOMINAL PAIN: 0
DEPRESSION: 0
FEVER: 0
CHILLS: 0
STRIDOR: 0
TINGLING: 0
SPUTUM PRODUCTION: 0
WEAKNESS: 0
SHORTNESS OF BREATH: 0
VOMITING: 0
DIARRHEA: 0

## 2023-11-11 ASSESSMENT — FIBROSIS 4 INDEX
FIB4 SCORE: 1.56
FIB4 SCORE: 1.56

## 2023-11-11 ASSESSMENT — CHA2DS2 SCORE
SEX: MALE
AGE 65 TO 74: NO
DIABETES: NO
VASCULAR DISEASE: NO
AGE 75 OR GREATER: YES
PRIOR STROKE OR TIA OR THROMBOEMBOLISM: NO
CHF OR LEFT VENTRICULAR DYSFUNCTION: NO
CHA2DS2 VASC SCORE: 3
HYPERTENSION: YES

## 2023-11-11 ASSESSMENT — LIFESTYLE VARIABLES
TOTAL SCORE: 0
CONSUMPTION TOTAL: NEGATIVE
HAVE YOU EVER FELT YOU SHOULD CUT DOWN ON YOUR DRINKING: NO
AVERAGE NUMBER OF DAYS PER WEEK YOU HAVE A DRINK CONTAINING ALCOHOL: 1
HAVE PEOPLE ANNOYED YOU BY CRITICIZING YOUR DRINKING: NO
TOTAL SCORE: 0
ON A TYPICAL DAY WHEN YOU DRINK ALCOHOL HOW MANY DRINKS DO YOU HAVE: 2
HOW MANY TIMES IN THE PAST YEAR HAVE YOU HAD 5 OR MORE DRINKS IN A DAY: 0
ALCOHOL_USE: YES
EVER HAD A DRINK FIRST THING IN THE MORNING TO STEADY YOUR NERVES TO GET RID OF A HANGOVER: NO
TOTAL SCORE: 0
EVER FELT BAD OR GUILTY ABOUT YOUR DRINKING: NO

## 2023-11-11 ASSESSMENT — PATIENT HEALTH QUESTIONNAIRE - PHQ9
2. FEELING DOWN, DEPRESSED, IRRITABLE, OR HOPELESS: NOT AT ALL
SUM OF ALL RESPONSES TO PHQ9 QUESTIONS 1 AND 2: 0
2. FEELING DOWN, DEPRESSED, IRRITABLE, OR HOPELESS: NOT AT ALL
1. LITTLE INTEREST OR PLEASURE IN DOING THINGS: NOT AT ALL
1. LITTLE INTEREST OR PLEASURE IN DOING THINGS: NOT AT ALL
SUM OF ALL RESPONSES TO PHQ9 QUESTIONS 1 AND 2: 0

## 2023-11-11 ASSESSMENT — COGNITIVE AND FUNCTIONAL STATUS - GENERAL
HELP NEEDED FOR BATHING: A LITTLE
DRESSING REGULAR LOWER BODY CLOTHING: A LITTLE
MOBILITY SCORE: 18
MOVING FROM LYING ON BACK TO SITTING ON SIDE OF FLAT BED: A LITTLE
SUGGESTED CMS G CODE MODIFIER DAILY ACTIVITY: CK
PERSONAL GROOMING: A LITTLE
MOVING TO AND FROM BED TO CHAIR: A LITTLE
STANDING UP FROM CHAIR USING ARMS: A LITTLE
EATING MEALS: A LITTLE
DAILY ACTIVITIY SCORE: 18
WALKING IN HOSPITAL ROOM: A LITTLE
DRESSING REGULAR UPPER BODY CLOTHING: A LITTLE
TOILETING: A LITTLE
SUGGESTED CMS G CODE MODIFIER MOBILITY: CK
TURNING FROM BACK TO SIDE WHILE IN FLAT BAD: A LITTLE
CLIMB 3 TO 5 STEPS WITH RAILING: A LITTLE

## 2023-11-11 ASSESSMENT — PAIN DESCRIPTION - PAIN TYPE
TYPE: ACUTE PAIN

## 2023-11-11 NOTE — ED TRIAGE NOTES
Chief Complaint   Patient presents with    Chest Pain     Pt reports mid sternal chest pain starting an 2 hrs ago. Denies sob, denies radiating chest pain, denies nausea

## 2023-11-11 NOTE — ED PROVIDER NOTES
ED Provider Note    CHIEF COMPLAINT  Chief Complaint   Patient presents with    Chest Pain     Pt reports mid sternal chest pain starting an 2 hrs ago. Denies sob, denies radiating chest pain, denies nausea       EXTERNAL RECORDS REVIEWED  Outpatient Notes reviewed office visit progress note by Dr. Winslow dated November 1, 2023.  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  Had reassuring device check, leads in good position, plan for follow-up in 6 months.  Patient's last stress test was nuclear medicine stress test in April 2015, reviewed this, unremarkable    HPI/ROS  LIMITATION TO HISTORY   Select: : None  OUTSIDE HISTORIAN(S):  Significant other at bedside comfortable with plan of care    Emil Reid is a 73 y.o. male who presents for evaluation of chest pain.  Patient has history of atrial fibrillation status post pacemaker placement.  He follows with Dr. Winslow of the electrophysiology service.  He is anticoagulated on Xarelto.  Presents today for midsternal pain to the chest described as pressure-like.  Began at rest, no exertional component.  Pain does not radiate elsewhere, no pain to the neck or jaw or left arm.  No nausea, no dyspnea, no diaphoresis.  He has no established history of coronary artery disease.  Given persistent discomfort he came to be assessed.    PAST MEDICAL HISTORY   has a past medical history of A-fib (HCC) (06/22/2023), AV block (07/01/2015), AVNRT (AV hitesh re-entry tachycardia), Bleeding from the nose, Cancer (HCC), GERD (gastroesophageal reflux disease) ( ), Heart burn, High cholesterol, Hyperlipemia, Hypertension, Indigestion, Pacemaker, and Urinary incontinence.    SURGICAL HISTORY   has a past surgical history that includes appendectomy; inguinal hernia repair bilateral; pilonidal cyst excision; recovery (07/01/2015); pacemaker insertion (07/01/2015); other (04/2023); and strabismus repair (Bilateral,  8/3/2023).    FAMILY HISTORY  Family History   Problem Relation Age of Onset    Heart Disease Mother     Diabetes Mother     Cancer Father 86        Pancreatitic       SOCIAL HISTORY  Social History     Tobacco Use    Smoking status: Never    Smokeless tobacco: Never    Tobacco comments:     Chew was over 35 years ago   Vaping Use    Vaping Use: Never used   Substance and Sexual Activity    Alcohol use: Yes     Alcohol/week: 1.2 oz     Types: 1 Glasses of wine, 1 Cans of beer per week     Comment: LESS THAN WEEKLY    Drug use: No     Types: Inhaled     Comment: smoked MJ in college, none since then.    Sexual activity: Yes     Partners: Female, Male       CURRENT MEDICATIONS  Home Medications       Reviewed by Carly Burch R.N. (Registered Nurse) on 11/10/23 at 2338  Med List Status: Partial     Medication Last Dose Status   Ascorbic Acid (VITAMIN C PO)  Active   lisinopril (PRINIVIL) 10 MG Tab  Active   metoprolol SR (TOPROL XL) 25 MG TABLET SR 24 HR  Active   Multiple Vitamins-Minerals (CENTRUM SILVER ULTRA MENS PO)  Active   pantoprazole (PROTONIX) 40 MG Tablet Delayed Response  Active   rivaroxaban (XARELTO) 20 MG Tab tablet  Active   simvastatin (ZOCOR) 40 MG Tab  Active   VITAMIN D PO  Active                    ALLERGIES  No Known Allergies    PHYSICAL EXAM  VITAL SIGNS: BP (!) 168/102   Pulse 78   Temp 36.7 °C (98 °F) (Temporal)   Resp 18   SpO2 94%    General: Alert, no acute distress  Skin: Warm, dry, normal for ethnicity  Head: Normocephalic, atraumatic  Neck: Trachea midline, no tenderness  Eye: PERRL, normal conjunctiva  ENMT: Oral mucosa moist, no pharyngeal erythema or exudate  Cardiovascular: Regular rate and rhythm, No murmur, Normal peripheral perfusion  Respiratory: Lungs CTA, respirations are non-labored, breath sounds are equal  Gastrointestinal: Soft, nontender, non distended  Musculoskeletal: No swelling, no deformity, no edema  Neurological: Alert and oriented to person, place,  time, and situation  Lymphatics: No lymphadenopathy  Psychiatric: Cooperative, appropriate mood & affect     DIAGNOSTIC STUDIES / PROCEDURES  EKG  I have independently interpreted this EKG  EKG Interpretation    Interpreted by emergency department physician    Rhythm: paced  Rate: 79  Axis: normal  Ectopy: none  Conduction: normal  ST Segments: nonspecific changes  T Waves: non specific changes  Q Waves: none    Clinical Impression: paced rhythm     LABS  Results for orders placed or performed during the hospital encounter of 11/10/23   CBC with Differential   Result Value Ref Range    WBC 8.5 4.8 - 10.8 K/uL    RBC 4.53 (L) 4.70 - 6.10 M/uL    Hemoglobin 14.3 14.0 - 18.0 g/dL    Hematocrit 42.5 42.0 - 52.0 %    MCV 93.8 81.4 - 97.8 fL    MCH 31.6 27.0 - 33.0 pg    MCHC 33.6 32.3 - 36.5 g/dL    RDW 45.1 35.9 - 50.0 fL    Platelet Count 236 164 - 446 K/uL    MPV 9.1 9.0 - 12.9 fL    Neutrophils-Polys 58.80 44.00 - 72.00 %    Lymphocytes 27.10 22.00 - 41.00 %    Monocytes 10.60 0.00 - 13.40 %    Eosinophils 2.10 0.00 - 6.90 %    Basophils 0.70 0.00 - 1.80 %    Immature Granulocytes 0.70 0.00 - 0.90 %    Nucleated RBC 0.00 0.00 - 0.20 /100 WBC    Neutrophils (Absolute) 4.97 1.82 - 7.42 K/uL    Lymphs (Absolute) 2.29 1.00 - 4.80 K/uL    Monos (Absolute) 0.90 (H) 0.00 - 0.85 K/uL    Eos (Absolute) 0.18 0.00 - 0.51 K/uL    Baso (Absolute) 0.06 0.00 - 0.12 K/uL    Immature Granulocytes (abs) 0.06 0.00 - 0.11 K/uL    NRBC (Absolute) 0.00 K/uL   Complete Metabolic Panel (CMP)   Result Value Ref Range    Sodium 145 135 - 145 mmol/L    Potassium 3.9 3.6 - 5.5 mmol/L    Chloride 110 96 - 112 mmol/L    Co2 23 20 - 33 mmol/L    Anion Gap 12.0 7.0 - 16.0    Glucose 100 (H) 65 - 99 mg/dL    Bun 24 (H) 8 - 22 mg/dL    Creatinine 0.76 0.50 - 1.40 mg/dL    Calcium 8.9 8.4 - 10.2 mg/dL    Correct Calcium 8.8 8.5 - 10.5 mg/dL    AST(SGOT) 24 12 - 45 U/L    ALT(SGPT) 24 2 - 50 U/L    Alkaline Phosphatase 89 30 - 99 U/L    Total Bilirubin  0.3 0.1 - 1.5 mg/dL    Albumin 4.1 3.2 - 4.9 g/dL    Total Protein 7.0 6.0 - 8.2 g/dL    Globulin 2.9 1.9 - 3.5 g/dL    A-G Ratio 1.4 g/dL   Troponins NOW   Result Value Ref Range    Troponin T 15 6 - 19 ng/L   Lipase   Result Value Ref Range    Lipase 35 11 - 82 U/L   CRP QUANTITIVE (NON-CARDIAC)   Result Value Ref Range    Stat C-Reactive Protein <0.30 0.00 - 0.75 mg/dL   ESTIMATED GFR   Result Value Ref Range    GFR (CKD-EPI) 93 >60 mL/min/1.73 m 2   EKG   Result Value Ref Range    Report       [facility] Emergency Dept.    Test Date:  2023-11-10  Pt Name:    ISRA SIMMONS                Department: [facility]  MRN:        [id number]                      [facility]  Gender:     Male                         Technician: [id number]  :        [id number]                   Requested By:ER TRIAGE PROTOCOL  Order #:    [id number]                    Reading MD:    Measurements  Intervals                                Axis  Rate:       79                           P:          48  SD:         164                          QRS:        -80  QRSD:       326                          T:          -20  QT:         514  QTc:        590    Interpretive Statements  Atrial-sensed ventricular-paced rhythm  No further analysis attempted due to paced rhythm  Compared to ECG 2023 12:57:34  No significant changes          RADIOLOGY  I have independently interpreted the diagnostic imaging associated with this visit and am waiting the final reading from the radiologist.   My preliminary interpretation is as follows: Normal-appearing cardiac silhouette, no effusion or infiltrate  Radiologist interpretation:   DX-CHEST-PORTABLE (1 VIEW)   Final Result         1.  Left basilar atelectasis, no focal infiltrate   2.  Cardiomegaly   3.  Atherosclerosis           COURSE & MEDICAL DECISION MAKING    ED Observation Status? Yes; I am placing the patient in to an observation status due to a diagnostic uncertainty as well  as therapeutic intensity. Patient placed in observation status at 11:50 PM, 11/10/2023.     Observation plan is as follows: Cardiac work-up including lipase and chest x-ray will be obtained.  Differential diagnosis at this point includes but is not restricted to acute coronary syndrome, pericarditis, myocarditis, musculoskeletal pain, gastritis    0132: Patient reassessed, he is still having active central chest pain but notes it is fairly mild at this time and declines analgesia.  I am concerned given his persistent pain though reassuringly normal work-up otherwise I cannot rule out ACS at this time.  Patient will need to be admitted for serial cardiac enzymes and likely stress test in the morning for cardiac restratification.  I spoke with the hospitalist Dr. Vaughn Suresh who concurs with plan of care thus far and accepts the patient to his service for admission to the medical surgical floor.    Upon Reevaluation, the patient's condition has: not improved; and will be escalated to hospitalization.    Patient discharged from ED Observation status at 0134 (Time) 11/11/23 (Date).     INITIAL ASSESSMENT, COURSE AND PLAN  Care Narrative: This patient is a very pleasant 73-year-old gentleman with history of hypertension, dyslipidemia, and paroxysmal atrial fibrillation who presents for evaluation of acute chest pain.  Pain is localized to the center of the precordium without radiation elsewhere, no associated dyspnea nor exertional component or vomiting.  Pattern of pain is not classic for cardiac etiology however given his significant risk factors, persistent chest pain throughout emergency department observational stay, and given nondiagnostic EKG as the patient has a pacemaker with heart score of 5, cannot rule out ACS at this time.  As such plan is for admission for serial cardiac enzymes and likely stress test in the morning for cardiac restratification.  HTN/IDDM FOLLOW UP:  The patient has known hypertension and  is being followed by their primary care doctor      ADDITIONAL PROBLEM LIST  Chest pain, elevated blood pressure reading  DISPOSITION AND DISCUSSIONS  I have discussed management of the patient with the following physicians and JAE's:   I spoke with the hospitalist Dr. Vaughn Suresh who concurs with plan of care thus far and accepts the patient to his service for admission to the medical surgical floor.    Discussion of management with other QHP or appropriate source(s): None     Escalation of care considered, and ultimately not performed:NA    Barriers to care at this time, including but not limited to:  NA .     Decision tools and prescription drugs considered including, but not limited to: HEART Score 5, moderate risk ratification .    FINAL DIAGNOSIS  1. Precordial chest pain    2. Dyslipidemia    3. Essential hypertension           Electronically signed by: Lynnette Bhagat M.D., 11/10/2023 11:40 PM

## 2023-11-11 NOTE — PROGRESS NOTES
Bedside report received from night RN. Assumed care of patient. Daily plan of care discussed. Pt awake and alert, continues to report chest pain, relieved with administration of nitroglycerin. 12 hour chart check complete. Hourly rounding in place.

## 2023-11-11 NOTE — PROGRESS NOTES
Pt arrived via gurney, admitted to room 203 from ED. Pt is A&Ox4, on 3L NC, reports 4/10 chest pain. Pt on continued on remote cardiac monitoring, oriented to room and call light. POC reviewed with pt. Fall precautions in place, bed at lowest position. Hourly rounding in place.

## 2023-11-11 NOTE — ASSESSMENT & PLAN NOTE
Patient now has a pacemaker and is currently with a paced rhythm  No additional information can be obtained from the EKG at this time

## 2023-11-11 NOTE — H&P
Hospital Medicine History & Physical Note    Date of Service  11/11/2023    Primary Care Physician  GRIFFIN NEVES    Consultants  None    Specialist Names: None    Code Status  Full Code    Chief Complaint  Chief Complaint   Patient presents with    Chest Pain     Pt reports mid sternal chest pain starting an 2 hrs ago. Denies sob, denies radiating chest pain, denies nausea       History of Presenting Illness  Emil Reid is a 73 y.o. male who presented 11/10/2023 with chest pain.  Patient states his pain started last night around 11 while he was awake.  Patient states it was located substernal and to the left of his chest, described as an ache and severe at times.  Patient states it was persistent so he presented to the emergency department.  While in the ER, he was given nitro x2 with improvement in his pain.  Patient did state that this pain is similar to pain he has had in the past.  That was after he had an ablation and it sounds like he was diagnosed with pericarditis.  Patient states it is worse with a deep breath.  He denies any nausea, vomiting, fever or chills.  He recently was dealing with a slight cold.  I did discuss the case including labs and imaging with the ER physician.    I discussed the plan of care with patient.    Review of Systems  Review of Systems   Constitutional:  Negative for chills, fever and malaise/fatigue.   HENT:  Negative for congestion.    Respiratory:  Negative for cough, sputum production, shortness of breath and stridor.    Cardiovascular:  Positive for chest pain. Negative for palpitations and leg swelling.   Gastrointestinal:  Negative for abdominal pain, constipation, diarrhea, nausea and vomiting.   Genitourinary:  Negative for dysuria and urgency.   Musculoskeletal:  Negative for falls and myalgias.   Neurological:  Negative for dizziness, tingling, loss of consciousness, weakness and headaches.   Psychiatric/Behavioral:  Negative for depression and suicidal ideas.     All other systems reviewed and are negative.      Past Medical History   has a past medical history of A-fib (HCC) (06/22/2023), AV block (07/01/2015), AVNRT (AV hitesh re-entry tachycardia), Bleeding from the nose, Cancer (HCC), GERD (gastroesophageal reflux disease) ( ), Heart burn, High cholesterol, Hyperlipemia, Hypertension, Indigestion, Pacemaker, and Urinary incontinence.    Surgical History   has a past surgical history that includes appendectomy; inguinal hernia repair bilateral; pilonidal cyst excision; recovery (07/01/2015); pacemaker insertion (07/01/2015); other (04/2023); and strabismus repair (Bilateral, 8/3/2023).     Family History  family history includes Cancer (age of onset: 86) in his father; Diabetes in his mother; Heart Disease in his mother.   Family history reviewed with patient. There is family history that is pertinent to the chief complaint.     Social History   reports that he has never smoked. He has never used smokeless tobacco. He reports current alcohol use of about 1.2 oz of alcohol per week. He reports that he does not use drugs.    Allergies  No Known Allergies    Medications  Prior to Admission Medications   Prescriptions Last Dose Informant Patient Reported? Taking?   Ascorbic Acid (VITAMIN C PO)  Patient Yes No   Sig: Take  by mouth every day.   Multiple Vitamins-Minerals (CENTRUM SILVER ULTRA MENS PO)  Patient, Family Member Yes No   Sig: Take 1 Tab by mouth every evening.   VITAMIN D PO  Patient Yes No   Sig: Take  by mouth every day.   lisinopril (PRINIVIL) 10 MG Tab  Patient, Family Member No No   Sig: Take 1 Tablet by mouth every evening.   metoprolol SR (TOPROL XL) 25 MG TABLET SR 24 HR  Patient, Family Member No No   Sig: Take 1 Tablet by mouth every day.   pantoprazole (PROTONIX) 40 MG Tablet Delayed Response  Patient, Family Member Yes No   Sig: Take 40 mg by mouth every evening.   rivaroxaban (XARELTO) 20 MG Tab tablet  Patient, Family Member No No   Sig: Take 1  Tablet by mouth with dinner.   simvastatin (ZOCOR) 40 MG Tab  Patient, Family Member No No   Sig: Take 1 Tablet by mouth every evening.      Facility-Administered Medications: None       Physical Exam  Temp:  [36.7 °C (98 °F)] 36.7 °C (98 °F)  Pulse:  [78-87] 78  Resp:  [18] 18  BP: (150-169)/() 168/102  SpO2:  [94 %-95 %] 94 %  Blood Pressure : (!) 168/102   Temperature: 36.7 °C (98 °F)   Pulse: 78   Respiration: 18   Pulse Oximetry: 94 %       Physical Exam  Vitals and nursing note reviewed.   Constitutional:       General: He is not in acute distress.     Appearance: He is well-developed. He is not toxic-appearing or diaphoretic.   HENT:      Head: Normocephalic and atraumatic.      Right Ear: External ear normal.      Left Ear: External ear normal.      Nose: Nose normal. No congestion or rhinorrhea.      Mouth/Throat:      Pharynx: No oropharyngeal exudate.   Eyes:      General:         Right eye: No discharge.         Left eye: No discharge.   Neck:      Trachea: No tracheal deviation.   Cardiovascular:      Rate and Rhythm: Normal rate and regular rhythm.      Heart sounds: No murmur heard.     No friction rub. No gallop.   Pulmonary:      Effort: Pulmonary effort is normal. No respiratory distress.      Breath sounds: Normal breath sounds. No stridor. No wheezing or rales.   Chest:      Chest wall: No tenderness.   Abdominal:      General: Bowel sounds are normal. There is no distension.      Palpations: Abdomen is soft.      Tenderness: There is no abdominal tenderness.   Musculoskeletal:         General: No tenderness. Normal range of motion.      Cervical back: Normal range of motion and neck supple. No edema or erythema.      Right lower leg: No edema.      Left lower leg: No edema.   Lymphadenopathy:      Cervical: No cervical adenopathy.   Skin:     General: Skin is warm and dry.      Findings: No erythema or rash.   Neurological:      Mental Status: He is alert and oriented to person, place, and  "time.      Cranial Nerves: No cranial nerve deficit.   Psychiatric:         Mood and Affect: Mood normal.         Behavior: Behavior normal.         Thought Content: Thought content normal.         Judgment: Judgment normal.         Laboratory:  Recent Labs     11/11/23 0003   WBC 8.5   RBC 4.53*   HEMOGLOBIN 14.3   HEMATOCRIT 42.5   MCV 93.8   MCH 31.6   MCHC 33.6   RDW 45.1   PLATELETCT 236   MPV 9.1     Recent Labs     11/11/23 0003   SODIUM 145   POTASSIUM 3.9   CHLORIDE 110   CO2 23   GLUCOSE 100*   BUN 24*   CREATININE 0.76   CALCIUM 8.9     Recent Labs     11/11/23 0003   ALTSGPT 24   ASTSGOT 24   ALKPHOSPHAT 89   TBILIRUBIN 0.3   LIPASE 35   GLUCOSE 100*         No results for input(s): \"NTPROBNP\" in the last 72 hours.      Recent Labs     11/11/23 0003   TROPONINT 15       Imaging:  DX-CHEST-PORTABLE (1 VIEW)   Final Result         1.  Left basilar atelectasis, no focal infiltrate   2.  Cardiomegaly   3.  Atherosclerosis      NM-CARDIAC STRESS TEST    (Results Pending)       X-Ray:  I have personally reviewed the images and compared with prior images.    Assessment/Plan:  Justification for Admission Status  I anticipate this patient is appropriate for observation status at this time because chest pain    Patient will need a Telemetry bed on MEDICAL service .  The need is secondary to chest pain.    * Chest pain- (present on admission)  Assessment & Plan  The 10-year ASCVD risk score (Abhijit PLACSENCIA, et al., 2019) is: 27%    Values used to calculate the score:      Age: 73 years      Sex: Male      Is Non- : No      Diabetic: No      Tobacco smoker: No      Systolic Blood Pressure: 127 mmHg      Is BP treated: Yes      HDL Cholesterol: 50 mg/dL      Total Cholesterol: 175 mg/dL  Multiple possible causes of his chest pain  Since he has had similar pain in the past and it sounds like it was pericarditis, will obtain an echocardiogram  Pain is worse with a deep breath, obtain " D-dimer  Troponin is negative even with ongoing pain, if the above work-up is negative, will obtain a stress test  Continue to trend troponin, repeat EKG and monitor patient on telemetry    Paroxysmal atrial fibrillation (HCC)- (present on admission)  Assessment & Plan  Patient now has a pacemaker and is currently with a paced rhythm  No additional information can be obtained from the EKG at this time    GERD (gastroesophageal reflux disease)- (present on admission)  Assessment & Plan  Continue home PPI    Hyperlipidemia- (present on admission)  Assessment & Plan  Continue home statin    Hypertension- (present on admission)  Assessment & Plan  Continue home lisinopril and metoprolol  Start as needed labetalol  Adjust as needed        VTE prophylaxis: therapeutic anticoagulation with Xarelto

## 2023-11-11 NOTE — PROGRESS NOTES
4 Eyes Skin Assessment Completed by Ma, RN and ARRON Montemayor.    Head WDL  Ears WDL  Nose WDL  Mouth WDL  Neck WDL  Breast/Chest Pacemaker on L side chest; healing sx incision over pacemaker site  Shoulder Blades WDL  Spine WDL  (R) Arm/Elbow/Hand WDL  (L) Arm/Elbow/Hand WDL  Abdomen Old Surgical Incision  Groin WDL  Scrotum/Coccyx/Buttocks WDL  (R) Leg WDL  (L) Leg WDL  (R) Heel/Foot/Toe WDL  (L) Heel/Foot/Toe WDL          Devices In Places Tele Box, Blood Pressure Cuff, Pulse Ox, and Nasal Cannula      Interventions In Place NC W/Ear Foams and Pillows    Possible Skin Injury No    Pictures Uploaded Into Epic N/A  Wound Consult Placed N/A  RN Wound Prevention Protocol Ordered No

## 2023-11-11 NOTE — PROGRESS NOTES
Patient admitted earlier by Dr. Suresh.  For further details please review his H&P.    We have consulted cardiology, who recommended stress test.  We appreciate further recommendations.  Continue to monitor closely.

## 2023-11-11 NOTE — ED NOTES
Pt verbalized relief of chest pain, pain score 2/10. Pt. Assisted to bedside commode insisted to move bowel, RN and Tech assisted pt.

## 2023-11-11 NOTE — CARE PLAN
The patient is Watcher - Medium risk of patient condition declining or worsening    Shift Goals  Clinical Goals: Monitor pt for recurrent chest pain; monitor labs  Patient Goals: Pain mgt, sleep and rest    Progress made toward(s) clinical / shift goals:  Pt reports 4/10 pain in mid chest non radiating to other areas at this time; hourly rounding in progress.    Problem: Pain - Standard  Goal: Alleviation of pain or a reduction in pain to the patient’s comfort goal  Outcome: Progressing     Problem: Knowledge Deficit - Standard  Goal: Patient and family/care givers will demonstrate understanding of plan of care, disease process/condition, diagnostic tests and medications  Outcome: Progressing     Problem: Psychosocial  Goal: Patient's ability to verbalize feelings about condition will improve  Outcome: Progressing     Problem: Hemodynamics  Goal: Patient's hemodynamics, fluid balance and neurologic status will be stable or improve  Outcome: Progressing       Patient is not progressing towards the following goals: NA

## 2023-11-11 NOTE — PROGRESS NOTES
Charge RN note: Pt stated pain is better than it was in the ED but still has some pain. Charge RN Gloria on tele called. Gloria stated to obtain another EKG.  0500 EKG obtained, Dr. Suresh notified.

## 2023-11-11 NOTE — ASSESSMENT & PLAN NOTE
The 10-year ASCVD risk score (Abhijit PLASCENCIA, et al., 2019) is: 27%    Values used to calculate the score:      Age: 73 years      Sex: Male      Is Non- : No      Diabetic: No      Tobacco smoker: No      Systolic Blood Pressure: 127 mmHg      Is BP treated: Yes      HDL Cholesterol: 50 mg/dL      Total Cholesterol: 175 mg/dL  Multiple possible causes of his chest pain  Since he has had similar pain in the past and it sounds like it was pericarditis, will obtain an echocardiogram  Pain is worse with a deep breath, obtain D-dimer  Troponin is negative even with ongoing pain, if the above work-up is negative, will obtain a stress test  Continue to trend troponin, repeat EKG and monitor patient on telemetry

## 2023-11-12 VITALS
RESPIRATION RATE: 18 BRPM | TEMPERATURE: 97.3 F | DIASTOLIC BLOOD PRESSURE: 82 MMHG | WEIGHT: 194.67 LBS | OXYGEN SATURATION: 93 % | HEART RATE: 78 BPM | BODY MASS INDEX: 27.25 KG/M2 | SYSTOLIC BLOOD PRESSURE: 132 MMHG | HEIGHT: 71 IN

## 2023-11-12 LAB
CHOLEST SERPL-MCNC: 124 MG/DL (ref 100–199)
EKG IMPRESSION: NORMAL
HDLC SERPL-MCNC: 57 MG/DL
LDLC SERPL CALC-MCNC: 55 MG/DL
TRIGL SERPL-MCNC: 58 MG/DL (ref 0–149)

## 2023-11-12 PROCEDURE — G0378 HOSPITAL OBSERVATION PER HR: HCPCS

## 2023-11-12 PROCEDURE — 93010 ELECTROCARDIOGRAM REPORT: CPT | Performed by: INTERNAL MEDICINE

## 2023-11-12 PROCEDURE — 99239 HOSP IP/OBS DSCHRG MGMT >30: CPT | Performed by: INTERNAL MEDICINE

## 2023-11-12 PROCEDURE — 700102 HCHG RX REV CODE 250 W/ 637 OVERRIDE(OP): Performed by: INTERNAL MEDICINE

## 2023-11-12 PROCEDURE — 36415 COLL VENOUS BLD VENIPUNCTURE: CPT

## 2023-11-12 PROCEDURE — 80061 LIPID PANEL: CPT

## 2023-11-12 PROCEDURE — A9270 NON-COVERED ITEM OR SERVICE: HCPCS | Performed by: INTERNAL MEDICINE

## 2023-11-12 PROCEDURE — 94760 N-INVAS EAR/PLS OXIMETRY 1: CPT

## 2023-11-12 RX ADMIN — COLCHICINE 0.6 MG: 0.6 TABLET ORAL at 06:07

## 2023-11-12 RX ADMIN — SENNOSIDES AND DOCUSATE SODIUM 2 TABLET: 50; 8.6 TABLET ORAL at 06:08

## 2023-11-12 RX ADMIN — METOPROLOL SUCCINATE 25 MG: 25 TABLET, EXTENDED RELEASE ORAL at 06:08

## 2023-11-12 ASSESSMENT — PAIN DESCRIPTION - PAIN TYPE
TYPE: ACUTE PAIN
TYPE: ACUTE PAIN

## 2023-11-12 NOTE — DISCHARGE SUMMARY
"Discharge Summary    CHIEF COMPLAINT ON ADMISSION  Chief Complaint   Patient presents with    Chest Pain     Pt reports mid sternal chest pain starting an 2 hrs ago. Denies sob, denies radiating chest pain, denies nausea       Reason for Admission  Chest Pain     Admission Date  11/10/2023    CODE STATUS  Full Code    HPI & HOSPITAL COURSE  As per chart review:  \"73 y.o. male who presented 11/10/2023 with chest pain.  Patient states his pain started last night around 11 while he was awake.  Patient states it was located substernal and to the left of his chest, described as an ache and severe at times.  Patient states it was persistent so he presented to the emergency department.  While in the ER, he was given nitro x2 with improvement in his pain.  Patient did state that this pain is similar to pain he has had in the past.  That was after he had an ablation and it sounds like he was diagnosed with pericarditis.  Patient states it is worse with a deep breath.  He denies any nausea, vomiting, fever or chills.  He recently was dealing with a slight cold.\"    Patient was admitted for further management and care the patient underwent stress test and echocardiogram.  Cardiology was consulted and they believe the patient with most likely pain consistent with pericarditis so they have recommended colchicine 0.6 twice daily.  Colchicine has been started since yesterday.  Today on the day of discharge the patient denying any chest pain.    The patient initially requiring oxygen, however as per nursing the patient is now on room air and does not require further oxygen.  As per cardiology the patient can be discharged home and they will follow-up as an outpatient.  The patient will be discharged home with colchicine that has been ordered by cardiology.  He will continue with colchicine as an outpatient and will require close follow-up with PCP and cardiology as an outpatient.      Therefore, he is discharged in fair and stable " condition to home with close outpatient follow-up.    The patient recovered much more quickly than anticipated on admission.    Discharge Date  11/12/2023    FOLLOW UP ITEMS POST DISCHARGE  The patient will require close follow-up with PCP and cardiology as an outpatient and will continue colchicine as an outpatient.    DISCHARGE DIAGNOSES  Principal Problem:    Acute viral pericarditis (POA: Yes)  Active Problems:    Hypertension (POA: Yes)    Hyperlipidemia (POA: Yes)    GERD (gastroesophageal reflux disease) (POA: Yes)    Paroxysmal atrial fibrillation (HCC) (POA: Yes)  Resolved Problems:    * No resolved hospital problems. *      FOLLOW UP  Future Appointments   Date Time Provider Department Center   11/27/2023  3:15 PM PACER CHECK-[facility] B [facility] None   5/29/2024 10:20 AM Alda Winslow M.D. [facility] None     Nicholas Quesada M.D.  [address]  [facility] [address]  [phone]    Schedule an appointment as soon as possible for a visit  As needed    Radha BORJAS Durand  [address] [place] [address] [id number]  [facility] [address]  [phone]    Schedule an appointment as soon as possible for a visit        MEDICATIONS ON DISCHARGE     Medication List        START taking these medications        Instructions   colchicine 0.6 MG Tabs  Commonly known as: Colcrys   Take 1 Tablet by mouth 2 times a day.  Dose: 0.6 mg            CONTINUE taking these medications        Instructions   CENTRUM SILVER ULTRA MENS PO   Take 1 Tab by mouth every evening.  Dose: 1 Tablet     lisinopril 10 MG Tabs  Commonly known as: Prinivil   Take 1 Tablet by mouth every evening.  Dose: 10 mg     metoprolol SR 25 MG Tb24  Commonly known as: Toprol XL   Take 1 Tablet by mouth every day.  Dose: 25 mg     pantoprazole 40 MG Tbec  Commonly known as: Protonix   Take 40 mg by mouth every evening.  Dose: 40 mg     rivaroxaban 20 MG Tabs tablet  Commonly known as: Xarelto   Take 1 Tablet by mouth with dinner.  Dose: 20 mg     simvastatin 40 MG Tabs  Commonly  known as: Zocor   Take 1 Tablet by mouth every evening.  Dose: 40 mg     VITAMIN C PO   Take  by mouth every day.     VITAMIN D PO   Take  by mouth every day.              Allergies  No Known Allergies    DIET  Orders Placed This Encounter   Procedures    Diet Order Diet: Cardiac; Miscellaneous modifications: (optional): No Decaf, No Caffeine(for test)     Standing Status:   Standing     Number of Occurrences:   1     Order Specific Question:   Diet:     Answer:   Cardiac [6]     Order Specific Question:   Miscellaneous modifications: (optional)     Answer:   No Decaf, No Caffeine(for test) [11]       ACTIVITY  As tolerated.  Weight bearing as tolerated    CONSULTATIONS  Cardiology    PROCEDURES  NUCLEAR IMAGING INTERPRETATION   Global hypokinesis.  LV ejection fraction = 36%.   Normal myocardial perfusion with no ischemia.      ECG INTERPRETATION   Negative stress ECG for ischemia.      ECHOCARDIOGRAM:  CONCLUSIONS  Normal left ventricular systolic function.  The left ventricular ejection fraction is visually estimated to be 55-  60% variable due to the present of arrhythmia.  Mild concentric left ventricular hypertrophy.  Abnormal distal septal apical wall motion consistent with  ventricular   pacing.  Normal right ventricular size and systolic function.  Pacer/ICD wire seen in the right ventricle.  No valvular abnormalities.         NM-CARDIAC STRESS TEST   Final Result      EC-ECHOCARDIOGRAM COMPLETE W/O CONT   Final Result      DX-CHEST-PORTABLE (1 VIEW)   Final Result         1.  Left basilar atelectasis, no focal infiltrate   2.  Cardiomegaly   3.  Atherosclerosis           LABORATORY  Lab Results   Component Value Date    SODIUM 145 11/11/2023    POTASSIUM 3.9 11/11/2023    CHLORIDE 110 11/11/2023    CO2 23 11/11/2023    GLUCOSE 100 (H) 11/11/2023    BUN 24 (H) 11/11/2023    CREATININE 0.76 11/11/2023        Lab Results   Component Value Date    WBC 8.5 11/11/2023    HEMOGLOBIN 14.3 11/11/2023    HEMATOCRIT  42.5 11/11/2023    PLATELETCT 236 11/11/2023        Total time of the discharge process exceeds 31 minutes.

## 2023-11-12 NOTE — PROGRESS NOTES
Pt discharged home in good and stable condition. Reviewed all discharge instructions and answered any questions. IV discontinued. Pt ambulated off unit with family.

## 2023-11-12 NOTE — CARE PLAN
Problem: Respiratory  Goal: Patient will achieve/maintain optimum respiratory ventilation and gas exchange  Outcome: Progressing     Problem: Mobility  Goal: Patient's capacity to carry out activities will improve  Outcome: Progressing     Problem: Self Care  Goal: Patient will have the ability to perform ADLs independently or with assistance (bathe, groom, dress, toilet and feed)  Outcome: Progressing     The patient is Stable - Low risk of patient condition declining or worsening    Shift Goals  Clinical Goals: Monitor and treat chest pain prn; ensure pt receives all ordered testing today  Patient Goals: Achieve consistent pain relief throughout shift  Family Goals: n/a    Progress made toward(s) clinical / shift goals:  Pt reports persistent chest pain, relieved with ambulation and prn nitroglycerin. No breakthrough pain reported throughout shift. Pt received ECG and stress test without issue. Pt started on colchicine per MD recommendations.    Patient is not progressing towards the following goals: n/a

## 2023-11-12 NOTE — CARE PLAN
The patient is Stable - Low risk of patient condition declining or worsening    Shift Goals  Clinical Goals: Monitor for chest pain  Patient Goals: Pain control, sleep  Family Goals: n/a    Progress made toward(s) clinical / shift goals:  Patient progressing toward all goals as expected.    Patient is not progressing towards the following goals:

## 2023-11-12 NOTE — PROGRESS NOTES
Telemetry Shift Summary    Rhythm paced  HR Range 80s  Ectopy F PVC  Measurements 0.16/0.14/0.44        Normal Values  Rhythm SR  HR Range    Measurements 0.12-0.20 / 0.06-0.10  / 0.30-0.52

## 2023-11-12 NOTE — PROGRESS NOTES
Telemetry Shift Summary    Rhythm Paced  HR Range 80s-100s  Ectopy frequent PVC's  Measurements --/0.12/0.40        Normal Values  Rhythm SR  HR Range    Measurements 0.12-0.20 / 0.06-0.10  / 0.30-0.52

## 2023-11-12 NOTE — PROGRESS NOTES
Received report from night shift RN, assumed care of pt. AAOx4, VSS on 1L O2- working on weaning down. Pt denies any pain this morning, just discomfort in chest but denies need for intervention. Resting in bed, updated on plan of care for the day and pt eager for discharge. Safety precautions in place, educated to call for assistance.

## 2023-11-12 NOTE — CONSULTS
Reason for Consult:  Asked by Dr Godfrey Roy* to see this patient with chest pain  Patient's PCP: GRIFFIN NEVES    CC:   Chief Complaint   Patient presents with    Chest Pain     Pt reports mid sternal chest pain starting an 2 hrs ago. Denies sob, denies radiating chest pain, denies nausea       HPI: This is a 73-year-old gentleman with history of pericarditis pacemaker A-fib who presents with chest pains pleuritic in nature he had worsened with movement and ambulation he had negative troponin testing and an echocardiogram does not show pericardial effusions we underwent pharmacologic stress test which showed normal perfusion.    Medications / Drug list prior to admission:  No current facility-administered medications on file prior to encounter.     Current Outpatient Medications on File Prior to Encounter   Medication Sig Dispense Refill    VITAMIN D PO Take  by mouth every day.      Ascorbic Acid (VITAMIN C PO) Take  by mouth every day.      metoprolol SR (TOPROL XL) 25 MG TABLET SR 24 HR Take 1 Tablet by mouth every day. 100 Tablet 4    rivaroxaban (XARELTO) 20 MG Tab tablet Take 1 Tablet by mouth with dinner. 30 Tablet 11    simvastatin (ZOCOR) 40 MG Tab Take 1 Tablet by mouth every evening. 90 Tablet 3    lisinopril (PRINIVIL) 10 MG Tab Take 1 Tablet by mouth every evening. 90 Tablet 3    pantoprazole (PROTONIX) 40 MG Tablet Delayed Response Take 40 mg by mouth every evening.      Multiple Vitamins-Minerals (CENTRUM SILVER ULTRA MENS PO) Take 1 Tab by mouth every evening.         Current list of administered Medications:    Current Facility-Administered Medications:     lisinopril (Prinivil) tablet 10 mg, 10 mg, Oral, Q EVENING, TAMARA NortonO., 10 mg at 11/11/23 1801    metoprolol SR (Toprol XL) tablet 25 mg, 25 mg, Oral, DAILY, TAMARA NortonO.    rivaroxaban (Xarelto) tablet 20 mg, 20 mg, Oral, PM MEAL, TAMARA NortonO., 20 mg at 11/11/23 1801    simvastatin (Zocor) tablet 40 mg,  "40 mg, Oral, Q EVENING, AP Norton.O., 40 mg at 11/11/23 1800    senna-docusate (Pericolace Or Senokot S) 8.6-50 MG per tablet 2 Tablet, 2 Tablet, Oral, BID, 2 Tablet at 11/11/23 1801 **AND** polyethylene glycol/lytes (Miralax) PACKET 1 Packet, 1 Packet, Oral, QDAY PRN **AND** magnesium hydroxide (Milk Of Magnesia) suspension 30 mL, 30 mL, Oral, QDAY PRN **AND** bisacodyl (Dulcolax) suppository 10 mg, 10 mg, Rectal, QDAY PRN, TAMARA NortonO.    acetaminophen (Tylenol) tablet 650 mg, 650 mg, Oral, Q6HRS PRN, AP Norton.O.    labetalol (Normodyne/Trandate) injection 10 mg, 10 mg, Intravenous, Q4HRS PRN, AP Norton.O.    ondansetron (Zofran) syringe/vial injection 4 mg, 4 mg, Intravenous, Q4HRS PRN, AP Norton.O.    ondansetron (Zofran ODT) dispertab 4 mg, 4 mg, Oral, Q4HRS PRN, AP Norton.O., 4 mg at 11/11/23 0302    omeprazole (PriLOSEC) capsule 20 mg, 20 mg, Oral, DAILY, AP Norton.O., 20 mg at 11/11/23 1800    morphine 4 MG/ML injection 4 mg, 4 mg, Intravenous, Q3HRS PRN, AP Norton.O., 4 mg at 11/11/23 0657    aminophylline injection 100 mg, 100 mg, Intravenous, Once PRN, Godfrey Leger M.D., Dose not Required at 11/11/23 1442    colchicine (Colcrys) tablet 0.6 mg, 0.6 mg, Oral, BID, Godfrey Leger M.D., 0.6 mg at 11/11/23 1801    Past Medical History:   Diagnosis Date    A-fib (HCC) 06/22/2023    \"will have an ablation in a few weeks\"    AV block 07/01/2015    Status post PPM implantation.    AVNRT (AV hitesh re-entry tachycardia)     Bleeding from the nose     Cancer (HCC)     prostate cancer    GERD (gastroesophageal reflux disease)      Heart burn     High cholesterol     Hyperlipemia     Hypertension     Indigestion     Pacemaker     Urinary incontinence        Past Surgical History:   Procedure Laterality Date    STRABISMUS REPAIR Bilateral 8/3/2023    Procedure: BILATERAL MEDIAL RECTUS RECESS, LEFT INFERIOR OBLIQUE " TRANSPOSITON;  Surgeon: Etelvina Barbour M.D.;  Location: SURGERY SAME DAY [facility];  Service: Ophthalmology    OTHER  04/2023    prostactectomy    RECOVERY  07/01/2015    Procedure:  CATH LAB  PM INSERT ABELINO SANCHEZ ICD9: 426.13;  Surgeon: Recoveryonnatalia Surgery;  [place] [facility]  Service:     PACEMAKER INSERTION  07/01/2015    [facility] #[id number] implanted by Dr. Sanchez.    APPENDECTOMY      INGUINAL HERNIA REPAIR BILATERAL      PILONIDAL CYST EXCISION         Family History   Problem Relation Age of Onset    Heart Disease Mother     Diabetes Mother     Cancer Father 86        Pancreatitic     Patient family history was personally reviewed, no pertinent family history to current presentation    Social History     Tobacco Use    Smoking status: Never    Smokeless tobacco: Never    Tobacco comments:     Chew was over 35 years ago   Vaping Use    Vaping Use: Never used   Substance Use Topics    Alcohol use: Yes     Alcohol/week: 1.2 oz     Types: 1 Glasses of wine, 1 Cans of beer per week     Comment: LESS THAN WEEKLY    Drug use: No     Types: Inhaled     Comment: smoked MJ in college, none since then.       ALLERGIES:  No Known Allergies    Review of systems:  A complete review of symptoms was reviewed with patient. This is reviewed in H&P and PMH. ALL OTHERS reviewed and negative    Physical exam:  Patient Vitals for the past 24 hrs:   BP Temp Temp src Pulse Resp SpO2 Height Weight   11/11/23 2000 99/67 36.3 °C (97.3 °F) Temporal 87 -- 94 % -- --   11/11/23 1701 139/79 36.4 °C (97.6 °F) Oral 94 18 94 % -- --   11/11/23 1700 -- -- -- 92 17 94 % -- --   11/11/23 1258 (!) 132/90 36.1 °C (97 °F) Oral 96 18 94 % -- --   11/11/23 0731 (!) 139/91 36.4 °C (97.6 °F) Oral 94 18 95 % -- --   11/11/23 0657 (!) 138/91 -- -- 91 18 96 % -- --   11/11/23 0414 127/82 36.4 °C (97.6 °F) Temporal 83 16 -- -- 88.3 kg (194 lb 10.7 oz)   11/11/23 0351 -- -- -- 84 -- 96 % -- --   11/11/23 0350 -- --  "-- 85 -- 96 % -- --   23 0330 117/70 -- -- 87 18 95 % -- --   23 0325 (!) 142/71 -- -- 70 18 96 % -- --   23 0309 94/55 -- -- 86 18 94 % -- --   23 0256 (!) 155/90 -- -- 96 18 92 % -- --   23 0251 (!) 189/90 -- -- 89 (!) 22 95 % -- --   23 0233 (!) 162/92 -- -- 88 -- 94 % -- --   23 0158 -- -- -- -- -- -- 1.803 m (5' 11\") 88.5 kg (195 lb)   23 0059 (!) 168/102 -- -- 78 18 94 % -- --   23 0009 (!) 150/89 -- -- 82 -- 94 % -- --   11/10/23 2343 (!) 169/92 36.7 °C (98 °F) Temporal 87 18 95 % -- --     General: No acute distress.   EYES: no jaundice  HEENT: OP clear   Neck:  No JVD.   CVS:  [ RRR.   Resp: Normal respiratory effort,   Abdomen: ND,  Skin: Grossly nothing acute no obvious rashes  Neurological: Alert, Moves all extremities  Extremities:   [   no edema. No cyanosis.       Data:  Laboratory studies personally reviewed by me:  Recent Results (from the past 24 hour(s))   EKG    Collection Time: 11/10/23 11:34 PM   Result Value Ref Range    Report       [facility] Emergency Dept.    Test Date:  2023-11-10  Pt Name:    ISRA SIMMONS                Department: [facility]  MRN:        [id number]                      Room:       -ROOM 7  Gender:     Male                         Technician: [id number]  :        1950                   Requested By:ER TRIAGE PROTOCOL  Order #:    [id number]                    Reading MD: ZEE PASCAL MD    Measurements  Intervals                                Axis  Rate:       79                           P:          48  IL:         164                          QRS:        -80  QRSD:       326                          T:          -20  QT:         514  QTc:        590    Interpretive Statements  Atrial-sensed ventricular-paced rhythm  No further analysis attempted due to paced rhythm  Compared to ECG 2023 12:57:34  No significant changes  Electronically Signed On 2023 01:38:27 PST by " ZEE PASCAL MD     CBC with Differential    Collection Time: 11/11/23 12:03 AM   Result Value Ref Range    WBC 8.5 4.8 - 10.8 K/uL    RBC 4.53 (L) 4.70 - 6.10 M/uL    Hemoglobin 14.3 14.0 - 18.0 g/dL    Hematocrit 42.5 42.0 - 52.0 %    MCV 93.8 81.4 - 97.8 fL    MCH 31.6 27.0 - 33.0 pg    MCHC 33.6 32.3 - 36.5 g/dL    RDW 45.1 35.9 - 50.0 fL    Platelet Count 236 164 - 446 K/uL    MPV 9.1 9.0 - 12.9 fL    Neutrophils-Polys 58.80 44.00 - 72.00 %    Lymphocytes 27.10 22.00 - 41.00 %    Monocytes 10.60 0.00 - 13.40 %    Eosinophils 2.10 0.00 - 6.90 %    Basophils 0.70 0.00 - 1.80 %    Immature Granulocytes 0.70 0.00 - 0.90 %    Nucleated RBC 0.00 0.00 - 0.20 /100 WBC    Neutrophils (Absolute) 4.97 1.82 - 7.42 K/uL    Lymphs (Absolute) 2.29 1.00 - 4.80 K/uL    Monos (Absolute) 0.90 (H) 0.00 - 0.85 K/uL    Eos (Absolute) 0.18 0.00 - 0.51 K/uL    Baso (Absolute) 0.06 0.00 - 0.12 K/uL    Immature Granulocytes (abs) 0.06 0.00 - 0.11 K/uL    NRBC (Absolute) 0.00 K/uL   Complete Metabolic Panel (CMP)    Collection Time: 11/11/23 12:03 AM   Result Value Ref Range    Sodium 145 135 - 145 mmol/L    Potassium 3.9 3.6 - 5.5 mmol/L    Chloride 110 96 - 112 mmol/L    Co2 23 20 - 33 mmol/L    Anion Gap 12.0 7.0 - 16.0    Glucose 100 (H) 65 - 99 mg/dL    Bun 24 (H) 8 - 22 mg/dL    Creatinine 0.76 0.50 - 1.40 mg/dL    Calcium 8.9 8.4 - 10.2 mg/dL    Correct Calcium 8.8 8.5 - 10.5 mg/dL    AST(SGOT) 24 12 - 45 U/L    ALT(SGPT) 24 2 - 50 U/L    Alkaline Phosphatase 89 30 - 99 U/L    Total Bilirubin 0.3 0.1 - 1.5 mg/dL    Albumin 4.1 3.2 - 4.9 g/dL    Total Protein 7.0 6.0 - 8.2 g/dL    Globulin 2.9 1.9 - 3.5 g/dL    A-G Ratio 1.4 g/dL   Troponins NOW    Collection Time: 11/11/23 12:03 AM   Result Value Ref Range    Troponin T 15 6 - 19 ng/L   Lipase    Collection Time: 11/11/23 12:03 AM   Result Value Ref Range    Lipase 35 11 - 82 U/L   CRP QUANTITIVE (NON-CARDIAC)    Collection Time: 11/11/23 12:03 AM   Result Value Ref  Range    Stat C-Reactive Protein <0.30 0.00 - 0.75 mg/dL   Sed Rate    Collection Time: 23 12:03 AM   Result Value Ref Range    Sed Rate Westergren 18 0 - 20 mm/hour   ESTIMATED GFR    Collection Time: 23 12:03 AM   Result Value Ref Range    GFR (CKD-EPI) 93 >60 mL/min/1.73 m 2   Troponins in two (2) hours    Collection Time: 23  2:20 AM   Result Value Ref Range    Troponin T 15 6 - 19 ng/L   EKG in four (4) hours    Collection Time: 23  2:51 AM   Result Value Ref Range    Report       [facility] Emergency Dept.    Test Date:  2023  Pt Name:    ISRA SIMMONS                Department: [facility]  MRN:        [id number]                      [facility]  Gender:     Male                         Technician: [id number]  :        1950                   Requested By:ROBERT TRAVIS  Order #:    [id number]                    Reading MD:    Measurements  Intervals                                Axis  Rate:       90                           P:          50  UT:         164                          QRS:        -86  QRSD:       129                          T:          -27  QT:         397  QTc:        486    Interpretive Statements  Atrial-sensed ventricular-paced rhythm  No further analysis attempted due to paced rhythm  Compared to ECG 11/10/2023 23:34:57  No significant changes     Troponin in two (2) hours    Collection Time: 23  4:35 AM   Result Value Ref Range    Troponin T 13 6 - 19 ng/L   EKG    Collection Time: 23  5:03 AM   Result Value Ref Range    Report       [facility]    Test Date:  2023  Pt Name:    ISRA SIMMONS                Department: [facility]  MRN:        [id number]                      Room:       2203  Gender:     Male                         Technician: [id number]  :        1950                   Requested By:ROBERT TRAVIS  Order #:    [id number]                    Reading MD:    Measurements  Intervals                                 Axis  Rate:       86                           P:          245  CO:         199                          QRS:        -21  QRSD:       136                          T:          190  QT:         444  QTc:        531    Interpretive Statements  Atrial-sensed ventricular-paced complexes  No further analysis attempted due to paced rhythm  Compared to ECG 11/11/2023 02:51:35  No significant changes     D-DIMER    Collection Time: 11/11/23  6:26 AM   Result Value Ref Range    D-Dimer 0.35 0.00 - 0.50 ug/mL (FEU)   EC-ECHOCARDIOGRAM COMPLETE W/O CONT    Collection Time: 11/11/23 10:22 AM   Result Value Ref Range    Left Ventrical Ejection Fraction 55        Imaging:  NM-CARDIAC STRESS TEST   Final Result      EC-ECHOCARDIOGRAM COMPLETE W/O CONT   Final Result      DX-CHEST-PORTABLE (1 VIEW)   Final Result         1.  Left basilar atelectasis, no focal infiltrate   2.  Cardiomegaly   3.  Atherosclerosis              EKG tracings personally reviewed by me  Sinus rhythm with pacing    Echocardiogram images personally reviewed by me show mildly reduced ejection fraction EF 45% with apical abnormality consistent with ventricular pacing    Stress test shows normal perfusion EF is 36% likely measured low in the setting of pacing    All pertinent features of laboratory and imaging reviewed including primary images where applicable      Principal Problem:    Acute viral pericarditis (POA: Yes)  Active Problems:    Hypertension (POA: Yes)    Hyperlipidemia (POA: Yes)    GERD (gastroesophageal reflux disease) (POA: Yes)    Paroxysmal atrial fibrillation (HCC) (POA: Yes)  Resolved Problems:    * No resolved hospital problems. *      Assessment / Plan:  Pericarditis  With normal stress test his presentation is most consistent with pericarditis recommend starting colchicine 0.6 twice daily    He can be safely discharged and follow-up        I personally discussed his case with  Dr Godfrey Roy*    Future  Appointments   Date Time Provider Department Center   11/27/2023  3:15 PM PACER CHECK-CAM B [facility] None   5/29/2024 10:20 AM ADONIS Peña None         It is my pleasure to participate in the care of Mr. Reid.  Please do not hesitate to contact me with questions or concerns.    Nicholas Quesada MD PhD [facility]  Cardiologist [facility]    11/11/2023    Please note that this dictation was created using voice recognition software. There may be errors I did not discover before finalizing the note.

## 2023-11-14 ENCOUNTER — OFFICE VISIT (OUTPATIENT)
Dept: CARDIOLOGY | Facility: MEDICAL CENTER | Age: 73
End: 2023-11-14
Attending: NURSE PRACTITIONER
Payer: MEDICARE

## 2023-11-14 VITALS
BODY MASS INDEX: 27.16 KG/M2 | HEIGHT: 71 IN | RESPIRATION RATE: 12 BRPM | HEART RATE: 44 BPM | WEIGHT: 194 LBS | DIASTOLIC BLOOD PRESSURE: 64 MMHG | OXYGEN SATURATION: 96 % | SYSTOLIC BLOOD PRESSURE: 116 MMHG

## 2023-11-14 VITALS
HEIGHT: 71 IN | OXYGEN SATURATION: 96 % | HEART RATE: 84 BPM | SYSTOLIC BLOOD PRESSURE: 116 MMHG | DIASTOLIC BLOOD PRESSURE: 64 MMHG | RESPIRATION RATE: 12 BRPM | WEIGHT: 194 LBS | BODY MASS INDEX: 27.16 KG/M2

## 2023-11-14 DIAGNOSIS — I30.1 ACUTE VIRAL PERICARDITIS: ICD-10-CM

## 2023-11-14 DIAGNOSIS — I44.1 SECOND DEGREE AV BLOCK: ICD-10-CM

## 2023-11-14 DIAGNOSIS — I10 PRIMARY HYPERTENSION: ICD-10-CM

## 2023-11-14 DIAGNOSIS — Z95.0 BIVENTRICULAR CARDIAC PACEMAKER IN SITU: ICD-10-CM

## 2023-11-14 DIAGNOSIS — I48.0 PAROXYSMAL ATRIAL FIBRILLATION (HCC): ICD-10-CM

## 2023-11-14 DIAGNOSIS — E78.00 PURE HYPERCHOLESTEROLEMIA: ICD-10-CM

## 2023-11-14 DIAGNOSIS — D68.318 CIRCULATING ANTICOAGULANTS (HCC): ICD-10-CM

## 2023-11-14 DIAGNOSIS — I47.19 AVNRT (AV NODAL RE-ENTRY TACHYCARDIA) (HCC): ICD-10-CM

## 2023-11-14 PROCEDURE — 99212 OFFICE O/P EST SF 10 MIN: CPT | Performed by: NURSE PRACTITIONER

## 2023-11-14 PROCEDURE — 3078F DIAST BP <80 MM HG: CPT | Performed by: NURSE PRACTITIONER

## 2023-11-14 PROCEDURE — 99214 OFFICE O/P EST MOD 30 MIN: CPT | Performed by: NURSE PRACTITIONER

## 2023-11-14 PROCEDURE — 93288 INTERROG EVL PM/LDLS PM IP: CPT | Mod: 26 | Performed by: NURSE PRACTITIONER

## 2023-11-14 PROCEDURE — 3074F SYST BP LT 130 MM HG: CPT | Performed by: NURSE PRACTITIONER

## 2023-11-14 ASSESSMENT — FIBROSIS 4 INDEX
FIB4 SCORE: 1.56
FIB4 SCORE: 1.56

## 2023-11-14 ASSESSMENT — ENCOUNTER SYMPTOMS
HEADACHES: 0
MYALGIAS: 0
PALPITATIONS: 0
PND: 0
CHILLS: 0
ORTHOPNEA: 0
COUGH: 0
DIZZINESS: 0
NAUSEA: 0
LOSS OF CONSCIOUSNESS: 0
SHORTNESS OF BREATH: 0
BRUISES/BLEEDS EASILY: 0
FEVER: 0
ABDOMINAL PAIN: 0
INSOMNIA: 0

## 2023-11-14 NOTE — PROGRESS NOTES
"Chief Complaint   Patient presents with    Hospital Follow-up    Biventricular PM    Pericarditis    AV Block Complete    Atrial Fibrillation    Anticoagulation    HTN (Controlled)    Hyperlipidemia       Subjective     Ed Leland Reid is a 73 y.o. male who presents today for hospital follow-up of acute pericarditis.    Emil is a 73 year old male with history of remote AVNRT ablation with subsequent second degree heart block with PM since 2015, PAFib, anticoagulation, hypertension and hyperlipidemia, normally followed by Dr. Winslow.    In September 2023, he had an ablation for persistent AFib, and in October 2023, he was upgrade to CRT-P, given reduced LV function.    On 11/10/2023, he presented to [facility] with chest pain. MPI showed no infarct or ischemia, and echocardiogram showed normal LV size and LVEF 55-60%, and he was thought to have pericarditis, treated with Colchicine.    He is here today for hospital follow-up. He is feeling better, with no further chest pain or discomfort. No symptomatic palpitations. No shortness of breath, orthopnea or PND; no dizziness or syncope; no edema. BP has been stable.     Past Medical History:   Diagnosis Date    A-fib (HCC) 06/22/2023    \"will have an ablation in a few weeks\"    AV block 07/01/2015    Status post PPM implantation.    AVNRT (AV hitesh re-entry tachycardia)     Bleeding from the nose     Cancer (HCC)     prostate cancer    GERD (gastroesophageal reflux disease)      Heart burn     High cholesterol     Hyperlipemia     Hypertension     Indigestion     Pacemaker     Urinary incontinence      Past Surgical History:   Procedure Laterality Date    STRABISMUS REPAIR Bilateral 8/3/2023    Procedure: BILATERAL MEDIAL RECTUS RECESS, LEFT INFERIOR OBLIQUE TRANSPOSITON;  Surgeon: Etelvina Barbour M.D.;  Location: SURGERY SAME DAY [facility];  Service: Ophthalmology    OTHER  04/2023    prostactectomy    RECOVERY  07/01/2015    Procedure:  CATH LAB  PM INSERT ST.BUTCH  SMITH ICD9: " 426.13;  Surgeon: Recoveryonly Surgery;  Location: SURGERY PRE-POST PROC UNIT [facility];  Service:     PACEMAKER INSERTION  07/01/2015    [employer] [address] #[id number] implanted by Dr. Sanchez.    APPENDECTOMY      INGUINAL HERNIA REPAIR BILATERAL      PILONIDAL CYST EXCISION       Family History   Problem Relation Age of Onset    Heart Disease Mother     Diabetes Mother     Cancer Father 86        Pancreatitic     Social History     Socioeconomic History    Marital status:      Spouse name: Not on file    Number of children: Not on file    Years of education: Not on file    Highest education level: Not on file   Occupational History    Not on file   Tobacco Use    Smoking status: Never    Smokeless tobacco: Never    Tobacco comments:     Chew was over 35 years ago   Vaping Use    Vaping Use: Never used   Substance and Sexual Activity    Alcohol use: Yes     Alcohol/week: 1.2 oz     Types: 1 Glasses of wine, 1 Cans of beer per week     Comment: LESS THAN WEEKLY    Drug use: No     Types: Inhaled     Comment: smoked MJ in college, none since then.    Sexual activity: Yes     Partners: Female, Male   Other Topics Concern    Not on file   Social History Narrative    Not on file     Social Determinants of Health     Financial Resource Strain: Not on file   Food Insecurity: Not on file   Transportation Needs: Not on file   Physical Activity: Not on file   Stress: Not on file   Social Connections: Not on file   Intimate Partner Violence: Not on file   Housing Stability: Not on file     No Known Allergies  Outpatient Encounter Medications as of 11/14/2023   Medication Sig Dispense Refill    colchicine (COLCRYS) 0.6 MG Tab Take 1 Tablet by mouth 2 times a day. 30 Tablet 1    VITAMIN D PO Take  by mouth every day.      Ascorbic Acid (VITAMIN C PO) Take  by mouth every day.      metoprolol SR (TOPROL XL) 25 MG TABLET SR 24 HR Take 1 Tablet by mouth every day. 100 Tablet 4    rivaroxaban (XARELTO) 20 MG Tab tablet Take 1  "Tablet by mouth with dinner. 30 Tablet 11    simvastatin (ZOCOR) 40 MG Tab Take 1 Tablet by mouth every evening. 90 Tablet 3    lisinopril (PRINIVIL) 10 MG Tab Take 1 Tablet by mouth every evening. 90 Tablet 3    pantoprazole (PROTONIX) 40 MG Tablet Delayed Response Take 40 mg by mouth every evening.      Multiple Vitamins-Minerals (CENTRUM SILVER ULTRA MENS PO) Take 1 Tab by mouth every evening.       No facility-administered encounter medications on file as of 11/14/2023.     Review of Systems   Constitutional:  Negative for chills and fever.   HENT:  Negative for congestion.    Respiratory:  Negative for cough and shortness of breath.    Cardiovascular:  Negative for chest pain, palpitations, orthopnea, leg swelling and PND.   Gastrointestinal:  Negative for abdominal pain and nausea.   Musculoskeletal:  Negative for myalgias.   Skin:  Negative for rash.   Neurological:  Negative for dizziness, loss of consciousness and headaches.   Endo/Heme/Allergies:  Does not bruise/bleed easily.   Psychiatric/Behavioral:  The patient does not have insomnia.               Objective     /64 (BP Location: Left arm, Patient Position: Sitting, BP Cuff Size: Adult)   Pulse 84   Resp 12   Ht 1.803 m (5' 11\")   Wt 88 kg (194 lb)   SpO2 96%   BMI 27.06 kg/m²     Physical Exam  Constitutional:       Appearance: He is well-developed.   HENT:      Head: Normocephalic.   Neck:      Vascular: No JVD.   Cardiovascular:      Rate and Rhythm: Normal rate and regular rhythm.      Heart sounds: Normal heart sounds.      Comments: PM in left chest wall.  Pulmonary:      Effort: Pulmonary effort is normal. No respiratory distress.      Breath sounds: Normal breath sounds. No wheezing or rales.   Abdominal:      General: Bowel sounds are normal. There is no distension.      Palpations: Abdomen is soft.      Tenderness: There is no abdominal tenderness.   Musculoskeletal:         General: Normal range of motion.      Cervical back: " Normal range of motion and neck supple.   Skin:     General: Skin is warm and dry.      Findings: No rash.   Neurological:      Mental Status: He is alert and oriented to person, place, and time.   Psychiatric:         Mood and Affect: Mood normal.         Behavior: Behavior normal.     CRT-P interrogation today reveals normal function. Mode switching episodes <1% of total time (10 episodes). No changes are made today.    NUCLEAR IMAGING INTERPRETATION OF MPI OF 11/11/2023:  Global hypokinesis.  LV ejection fraction = 36%.  Normal myocardial perfusion with no ischemia.     CONCLUSIONS OF TTE OF 11/11/2023:  Normal left ventricular systolic function.  The left ventricular ejection fraction is visually estimated to be 55-60% variable due to the present of arrhythmia.  Mild concentric left ventricular hypertrophy.  Abnormal distal septal apical wall motion consistent with ventricular pacing.  Normal right ventricular size and systolic function.  Pacer/ICD wire seen in the right ventricle.  No valvular abnormalities.     CONCLUSION OF TTE OF 9/16/2023:  Normal left ventricular chamber size.  The left ventricular ejection fraction is visually estimated to be 40%.   Moderate mitral regurgitation.  Estimated right ventricular systolic pressure is 45 mmHg.  Normal inferior vena cava size and inspiratory collapse.    Lab Results   Component Value Date/Time    SODIUM 145 11/11/2023 12:03 AM    POTASSIUM 3.9 11/11/2023 12:03 AM    CHLORIDE 110 11/11/2023 12:03 AM    CO2 23 11/11/2023 12:03 AM    GLUCOSE 100 (H) 11/11/2023 12:03 AM    BUN 24 (H) 11/11/2023 12:03 AM    CREATININE 0.76 11/11/2023 12:03 AM    BUNCREATRAT 20.0 04/28/2023 05:09 AM    BUNCREATRAT 25 (H) 02/05/2015 07:58 AM      Lab Results   Component Value Date/Time    WBC 8.5 11/11/2023 12:03 AM    RBC 4.53 (L) 11/11/2023 12:03 AM    HEMOGLOBIN 14.3 11/11/2023 12:03 AM    HEMATOCRIT 42.5 11/11/2023 12:03 AM    MCV 93.8 11/11/2023 12:03 AM    MCH 31.6 11/11/2023  12:03 AM    MCHC 33.6 11/11/2023 12:03 AM    MPV 9.1 11/11/2023 12:03 AM       Lab Results   Component Value Date/Time    ASTSGOT 24 11/11/2023 12:03 AM    ALTSGPT 24 11/11/2023 12:03 AM       Lab Results   Component Value Date/Time    CHOLSTRLTOT 124 11/12/2023 12:26 AM    LDL 55 11/12/2023 12:26 AM    HDL 57 11/12/2023 12:26 AM    TRIGLYCERIDE 58 11/12/2023 12:26 AM          Assessment & Plan     1. Acute viral pericarditis        2. Biventricular cardiac pacemaker in situ        3. Second degree AV block        4. AVNRT (AV hitesh re-entry tachycardia)        5. Paroxysmal atrial fibrillation (HCC)        6. Primary hypertension        7. Pure hypercholesterolemia            Medical Decision Making: Today's Assessment/Status/Plan:        Acute pericarditis, treated with Colchicine. Symptoms are pretty resolved. He will complete Rx.    2. Second degree AV block, with PAFib, with reduced LV function (with improved LVEF on echo in November 2023), upgraded to CRT-P in October 2023. Device is working normally.    3. Chronic anticoagulation with Xarelto, no bleeding problems.    4. Hypertension, treated with Toprol XL and Lisinopril, stable. BP is good.    5. Hyperlipidemia, treated with Zocor 40mg. Last lipid panel was excellent.    He is doing better, and symptoms are resolved. Device is working normally. We reviewed echo and MPI results. Same medications for now, and follow-up in 3 months for next PM check; keep May 2024 FU with Dr. Nayla quijano.

## 2023-11-15 ENCOUNTER — TELEPHONE (OUTPATIENT)
Dept: CARDIOLOGY | Facility: MEDICAL CENTER | Age: 73
End: 2023-11-15
Payer: MEDICARE

## 2023-11-15 NOTE — TELEPHONE ENCOUNTER
AB       Caller: Adrian Reid     Topic/issue: Patient wants to know if his antibiotics and the medication colchicine (COLCRYS) 0.6 MG Tab will interact with themselves? Patient is at Dentist now       Callback Number: [phone]    Thank You   Samira LANZA

## 2023-11-16 NOTE — TELEPHONE ENCOUNTER
S/W pt, advised that he reach out to pharmacist regarding interaction questions. Pt agrees to do so.

## 2023-11-29 ENCOUNTER — PATIENT MESSAGE (OUTPATIENT)
Dept: HEALTH INFORMATION MANAGEMENT | Facility: OTHER | Age: 73
End: 2023-11-29

## 2023-12-02 ENCOUNTER — PATIENT MESSAGE (OUTPATIENT)
Dept: CARDIOLOGY | Facility: MEDICAL CENTER | Age: 73
End: 2023-12-02
Payer: MEDICARE

## 2023-12-04 ENCOUNTER — PATIENT MESSAGE (OUTPATIENT)
Dept: CARDIOLOGY | Facility: MEDICAL CENTER | Age: 73
End: 2023-12-04
Payer: MEDICARE

## 2023-12-04 NOTE — PROGRESS NOTES
His last remote transmission was 11/21/2023.  Could he do a patient-activated transmission, so I can see if he's having any AFib episodes.    He actually has PAROXYSMAL AFib, not persistent.    And I'm happy to squeeze him in if necessary -  just want to see his remote info first.    Thanks, AB

## 2023-12-04 NOTE — PATIENT COMMUNICATION
Noted below from AB:  PAULA Martinez      12/4/23 11:48 AM  Note      His last remote transmission was 11/21/2023.  Could he do a patient-activated transmission, so I can see if he's having any AFib episodes.     He actually has PAROXYSMAL AFib, not persistent.     And I'm happy to squeeze him in if necessary -  just want to see his remote info first.     Thanks, AB Robertson to patient

## 2023-12-04 NOTE — PATIENT COMMUNICATION
Noted below from device team:  Valarie Wynn, Deo Davis'clem Riley R.N.30 minutes ago (2:45 PM)     Most recent transmission from 11/21, will watch for manual transmission.     Awaiting patient transmission

## 2023-12-05 ENCOUNTER — PATIENT MESSAGE (OUTPATIENT)
Dept: CARDIOLOGY | Facility: MEDICAL CENTER | Age: 73
End: 2023-12-05
Payer: MEDICARE

## 2023-12-06 NOTE — PATIENT COMMUNICATION
Attempted to call patient and walk through sending a manual transmission, no answer, left a brief voicemail with callback number [phone] and sent instructions through [employer].

## 2023-12-07 ENCOUNTER — PATIENT MESSAGE (OUTPATIENT)
Dept: CARDIOLOGY | Facility: MEDICAL CENTER | Age: 73
End: 2023-12-07
Payer: MEDICARE

## 2023-12-08 ENCOUNTER — TELEPHONE (OUTPATIENT)
Dept: CARDIOLOGY | Facility: MEDICAL CENTER | Age: 73
End: 2023-12-08
Payer: MEDICARE

## 2023-12-08 ENCOUNTER — PATIENT MESSAGE (OUTPATIENT)
Dept: CARDIOLOGY | Facility: MEDICAL CENTER | Age: 73
End: 2023-12-08
Payer: MEDICARE

## 2023-12-08 NOTE — PROGRESS NOTES
I reviewed the remote transmission (of 6+ minutes of AFib).    For now, I would recommend he follow the advice from recent hospitalization, and stay OFF of Xarelto for 10 days, and then resume it.    We will continue to monitor his device for any arrythmias.  Let us know if he has any further bleeding once he resumes it.    Thanks, AB

## 2023-12-08 NOTE — PATIENT COMMUNICATION
Noted AB recommendations.   PAULA Martinez1 hour ago (10:09 AM)       I reviewed the remote transmission (of 6+ minutes of AFib).     For now, I would recommend he follow the advice from recent hospitalization, and stay OFF of Xarelto for 10 days, and then resume it.     We will continue to monitor his device for any arrythmias.  Let us know if he has any further bleeding once he resumes it.     Thanks, AB Robertson to patient

## 2023-12-08 NOTE — PATIENT COMMUNICATION
AB: See 12/2/23 message. Remote monitor information received. Please advise if any recommendations. Thank you.

## 2023-12-08 NOTE — PATIENT COMMUNICATION
FYI - remote transmission received via home monitor, full report scanned into Media.    23 Total AMS Episodes on 12/5/2023, longest episode details below.     AT/AF Episode  -Episode Onset: 12/5/2023 @ 10:11 AM  -Duration: 6 minutes 10 seconds

## 2023-12-08 NOTE — LETTER
PROCEDURE/SURGERY CLEARANCE FORM      Encounter Date: 12/8/2023    Patient: Emil Reid  YOB: 1950    CARDIOLOGIST:  Alda Winslow M.D    REFERRING DOCTOR:  No ref. provider found    The above patient is cleared to have the following procedure/surgery: Dental Extraction with bone graft and Implant placement                                            Additional comments: Cleared, already off xarelto       Electronically Signed        MD Signature   Alda Winslow M.D   Patient: Lisa Valdez  MRN: [id number]  : 1947      Chief Complaint   Patient presents with   â¢ Other     Follow up from L knee COOLIEF RFA     HPI: González Aj is a pleasant 73-year-old female who returns to the clinic for follow-up after a left knee coolief RFA done on 10/16/18. She reports 100% improvement in pain and functionality. She is extremely pleased with results. Denies any complications following the procedure. Denies numbness, tingling or weakness of her left lower extremity. She would also like to discuss her bilateral low back pain. She has a history of having bilateral sacroiliac joint injections last done on 17 by Dr. Sharon Oh.  She has 100% improvement in pain for approximately 1-1/2 years. Her exact same pain has now returned. Denies radiation. She asked if Dr. Dianna Perales could repeat the injections. Past Medical History:   Diagnosis Date   â¢ Anxiety    â¢ Asthma 2015   â¢ Basal cell carcinoma     nose and lower abdomen   â¢ Bronchitis    â¢ Chronic sinusitis    â¢ CKD (chronic kidney disease) stage 3, GFR 30-59 ml/min (CMS/[facility]) 2016   â¢ Decreased diffusion capacity of lung 2015   â¢ Depression    â¢ Deviated septum    â¢ Dry eye syndrome    â¢ Fracture     hx of fractured ankle and foot   â¢ Glaucoma    â¢ Hyperlipidemia    â¢ Hypertension    â¢ Left carpal tunnel syndrome    â¢ Left ventricular diastolic dysfunction    â¢ Low back pain    â¢ Migraine headaches    â¢ Mild to moderate mitral regurgitation 2015   â¢ Mild tricuspid regurgitation 2015   â¢ Obesity    â¢ Obstructive sleep apnea 2016    Mild per sleep study 2016.   Uses bi pap    â¢ Osteoarthritis    â¢ Osteopenia    â¢ Pseudophakia of both eyes     w/ PC IOL, both eyes   â¢ PVD (posterior vitreous detachment), both eyes    â¢ S/P YAG capsulotomy     OD x 2  ( & )   â¢ Vitamin D deficiency      ROS: All 14 systems reviewed with the patient and positives include those things as listed in "the HPI. OBJECTIVE:    Visit Vitals  /80 (Patient Position: Sitting, Cuff Size: Regular) Comment (BP Location): Left wrist   Pulse 78   Ht 4' 11"" (1.499 m) Comment: Stated   Wt 90.7 kg Comment: Stated   BMI 40.40 kg/mÂ²     General: Patient is alert and oriented x3, and in no acute distress. Respirations are unlabored. Skin is warm, dry. Neurovascular: Modified straight leg raise is negative bilaterally. Strength is 5/5 with bilaterally hip flexion, knee flexion and extension, ankle dorsiflexion, and EHL testing. Musculoskeletal: Stands with a slightly forward flexed posture at the hips. Sitting piriformis stretch is negative bilaterally. Tay's test is positive bilaterally. The SI joints are tender to palpation bilaterally. Lower extremity traction is equivocal. Piriformis muscles, IT bands, greater trochanteric bursas are nontender bilaterally. Forward flexion, extension, side flexion and rotation are within normal limits and without pain. Psych: Mood is pleasant, affect and behavior are appropriate. ASSESSMENT:    1. Chronic left knee pain and osteoarthritis  2. Bilateral sacroiliac joint dysfunction    PLAN:  1. We are pleased with the results of the left knee RFA. Patient is aware that she may repeat the procedure at the six-month jimmy if her pain were to return. Patient was advised to avoid bedrest and resume regular activities. 2.  Treatment will consist of ordering ordering bilateral sacroiliac joint injections under fluoroscopic guidance. I will see the patient back in 2-3 weeks following the injection. No medications are prescribed or changed today. Patient expresses understanding and agreement with this plan, and will call if any questions or concerns arise. Overall today we spent approximately 20 minutes in face-to-face time discussing the above, answering questions as best we were able today.   Greater than 50% of today's visit was spent in direct counseling over the " above and plan of care. Please note today's visit is done under the direction and with the guidance of Dr. Monster Borrero, who was present in the office.       Signed,  PARISA Arcos  11/12/2018 11:08 AM

## 2023-12-08 NOTE — TELEPHONE ENCOUNTER
AB    Caller: Sophia Nguyen [facility]     Topic/issue: Sophia called in regards to needing a med consult with SS, Sophia faxed over the form and would like to put a rush on this as the pt has the appt Tomorrow morning. Please advise.     Callback Number: [phone] open intel 2      Thank you,     uYry BRANDON

## 2023-12-08 NOTE — TELEPHONE ENCOUNTER
Joe Moran,  This looks to be a surgical clearance for dentist office? I do not see that the fax has been received yet, but can you process once it is?

## 2023-12-09 NOTE — PATIENT COMMUNICATION
To device team: Did we happen to receive a transmission from this patient? Please let me know. Thank you!

## 2023-12-11 ENCOUNTER — OFFICE VISIT (OUTPATIENT)
Dept: CARDIOLOGY | Facility: MEDICAL CENTER | Age: 73
End: 2023-12-11
Attending: INTERNAL MEDICINE
Payer: MEDICARE

## 2023-12-11 VITALS
HEIGHT: 71 IN | RESPIRATION RATE: 14 BRPM | BODY MASS INDEX: 26.88 KG/M2 | DIASTOLIC BLOOD PRESSURE: 76 MMHG | OXYGEN SATURATION: 95 % | HEART RATE: 84 BPM | WEIGHT: 192 LBS | SYSTOLIC BLOOD PRESSURE: 120 MMHG

## 2023-12-11 DIAGNOSIS — I48.0 PAROXYSMAL ATRIAL FIBRILLATION (HCC): ICD-10-CM

## 2023-12-11 DIAGNOSIS — I47.19 AVNRT (AV NODAL RE-ENTRY TACHYCARDIA) (HCC): ICD-10-CM

## 2023-12-11 DIAGNOSIS — Z95.0 BIVENTRICULAR CARDIAC PACEMAKER IN SITU: ICD-10-CM

## 2023-12-11 PROCEDURE — 99214 OFFICE O/P EST MOD 30 MIN: CPT | Mod: 24 | Performed by: INTERNAL MEDICINE

## 2023-12-11 PROCEDURE — 93005 ELECTROCARDIOGRAM TRACING: CPT | Performed by: INTERNAL MEDICINE

## 2023-12-11 PROCEDURE — 93281 PM DEVICE PROGR EVAL MULTI: CPT | Performed by: INTERNAL MEDICINE

## 2023-12-11 PROCEDURE — 3074F SYST BP LT 130 MM HG: CPT | Performed by: INTERNAL MEDICINE

## 2023-12-11 PROCEDURE — 93281 PM DEVICE PROGR EVAL MULTI: CPT | Mod: 26 | Performed by: INTERNAL MEDICINE

## 2023-12-11 PROCEDURE — 3078F DIAST BP <80 MM HG: CPT | Performed by: INTERNAL MEDICINE

## 2023-12-11 PROCEDURE — 99212 OFFICE O/P EST SF 10 MIN: CPT | Performed by: INTERNAL MEDICINE

## 2023-12-11 ASSESSMENT — FIBROSIS 4 INDEX: FIB4 SCORE: 1.56

## 2023-12-11 NOTE — TELEPHONE ENCOUNTER
AB    [facility]      Topic/issue: Sophia is wondering if she can just get a verbal in the meantime until the fax comes over because she has the patient coming in now tomorrow for procedure.     Callback Number: [phone]     Thank you,  -Melinda TRACY

## 2023-12-11 NOTE — TELEPHONE ENCOUNTER
Last OV: 11/14/2023  Proposed Surgery: Dental Extraction with bone graft and Implant placement  Surgery Date: TBD  Requesting Office Name: [facility]  Fax Number: [phone]  Preference of Location (default is surgery center unless specified by Cardiologist or JAE)  Prior Clearance Addressed: No      Anticoags/Antiplatelets: Xarelto  Outstanding Cardiac Imaging : No  Stent, Cardiac Devices, or Catheterization: Yes  Date : 10/16/2023   Ablation, TAVR/Valve (including open heart), Cardioversion: Yes  Date: 9/15/2023  Completed within the last 6 months. Forward to provider for review  Recent Cardiac Hospitalization: Yes  Date:  11/10/2023            When: Hospitalized in the last 6 months. Forward to provider to review.   History (cardiac history):   Past Medical History:   Diagnosis Date    A-fib (HCC) 09/2023    Status post ablation by Dr. Winslow.    AV block 07/01/2015    Status post PPM implantation.    AVNRT (AV hitesh re-entry tachycardia)     Bleeding from the nose     Cancer (HCC)     prostate cancer    Chronic anticoagulation     GERD (gastroesophageal reflux disease)      Heart burn     Hyperlipemia     Hypertension     Pacemaker     Urinary incontinence              Surgical Clearance Letter Sent: NO. Provider to advise.   **Scan clearance request letter into [facility].**

## 2023-12-12 NOTE — TELEPHONE ENCOUNTER
Alda Winslow M.D.  You7 minutes ago (4:07 PM)       Cleared, already off xarelto       Clearance faxed to [facility]

## 2023-12-12 NOTE — PROGRESS NOTES
Arrhythmia Clinic Note (Established patient)    DOS: 12/11/2023    Chief complaint/Reason for consult: F/u AF    Interval History:  Pt is a 72 yo M. Well known to me. Initially met for gen change. Noted he had progressed from paroxysmal to persistent AF. Underwent AF ablation, now with <1% burden of AF longest episodes a few minutes. Heart block from prior AVNRT ablation. Underwent successful upgrade to CRT-P later with worsening LV systolic function.    More recently LV function improved. OAC was stopped due to bleeding diverticula.     ROS (+ highlighted in red):  General--Negative for fatigue, weight loss or weight gain  Cardiovascular--Negative for CP, orthopnea, PND    Past Medical History:   Diagnosis Date    A-fib (HCC) 09/2023    Status post ablation by Dr. Winslow.    AV block 07/01/2015    Status post PPM implantation.    AVNRT (AV hitesh re-entry tachycardia)     Bleeding from the nose     Cancer (HCC)     prostate cancer    Chronic anticoagulation     GERD (gastroesophageal reflux disease)      Heart burn     Hyperlipemia     Hypertension     Pacemaker     Urinary incontinence        Past Surgical History:   Procedure Laterality Date    STRABISMUS REPAIR Bilateral 8/3/2023    Procedure: BILATERAL MEDIAL RECTUS RECESS, LEFT INFERIOR OBLIQUE TRANSPOSITON;  Surgeon: Etelvina Barbour M.D.;  Location: SURGERY SAME DAY [facility];  Service: Ophthalmology    OTHER  04/2023    prostactectomy    RECOVERY  07/01/2015    Procedure:  CATH LAB  PM INSERT [facility] ICD9: 426.13;  Surgeon: Recoveryonly Surgery;  [place] [facility]  Service:     PACEMAKER INSERTION  07/01/2015    [facility] DR #[id number] implanted by Dr. Sanchez.    APPENDECTOMY      INGUINAL HERNIA REPAIR BILATERAL      PILONIDAL CYST EXCISION         Social History     Socioeconomic History    Marital status:      Spouse name: Not on file    Number of children: Not on file    Years of education: Not on file     Highest education level: Not on file   Occupational History    Not on file   Tobacco Use    Smoking status: Never    Smokeless tobacco: Never    Tobacco comments:     Chew was over 35 years ago   Vaping Use    Vaping Use: Never used   Substance and Sexual Activity    Alcohol use: Yes     Alcohol/week: 1.2 oz     Types: 1 Glasses of wine, 1 Cans of beer per week     Comment: LESS THAN WEEKLY    Drug use: No     Types: Inhaled     Comment: smoked MJ in college, none since then.    Sexual activity: Yes     Partners: Female, Male   Other Topics Concern    Not on file   Social History Narrative    Not on file     Social Determinants of Health     Financial Resource Strain: Not on file   Food Insecurity: Not on file   Transportation Needs: Not on file   Physical Activity: Not on file   Stress: Not on file   Social Connections: Not on file   Intimate Partner Violence: Not on file   Housing Stability: Not on file       Family History   Problem Relation Age of Onset    Heart Disease Mother     Diabetes Mother     Cancer Father 86        Pancreatitic       No Known Allergies    Current Outpatient Medications   Medication Sig Dispense Refill    Ferrous Sulfate (IRON PO) Take  by mouth.      VITAMIN D PO Take  by mouth every day.      Ascorbic Acid (VITAMIN C PO) Take  by mouth every day.      metoprolol SR (TOPROL XL) 25 MG TABLET SR 24 HR Take 1 Tablet by mouth every day. 100 Tablet 4    simvastatin (ZOCOR) 40 MG Tab Take 1 Tablet by mouth every evening. 90 Tablet 3    lisinopril (PRINIVIL) 10 MG Tab Take 1 Tablet by mouth every evening. 90 Tablet 3    pantoprazole (PROTONIX) 40 MG Tablet Delayed Response Take 40 mg by mouth every evening.      Multiple Vitamins-Minerals (CENTRUM SILVER ULTRA MENS PO) Take 1 Tab by mouth every evening.      colchicine (COLCRYS) 0.6 MG Tab Take 1 Tablet by mouth 2 times a day. 30 Tablet 1    rivaroxaban (XARELTO) 20 MG Tab tablet Take 1 Tablet by mouth with dinner. 30 Tablet 11     No current  "facility-administered medications for this visit.       Physical Exam:  Vitals:    12/11/23 1526   BP: 120/76   BP Location: Left arm   Patient Position: Sitting   BP Cuff Size: Adult   Pulse: 84   Resp: 14   SpO2: 95%   Weight: 87.1 kg (192 lb)   Height: 1.803 m (5' 11\")     General appearance: NAD, conversant  HEENT: PERRL, neck is supple with FROM  Lungs: Clear to auscultation, normal respiratory effort  CV: RRR, no murmurs/rubs/gallops, no JVD  Abdomen: Soft, non-tender with normal bowel sounds  Extremities: No peripheral edema, no clubbing or cyanosis  Skin: No rash, lesions, or ulcers  Psych: Alert and oriented to person, place and time    Data:  Labs reviewed    Prior echo/stress reviewed:  LVEF 55%    EKG interpreted by me:  BiV paced    Impression/Plan:  1. Paroxysmal atrial fibrillation (HCC)  EKG      2. AVNRT (AV hitesh re-entry tachycardia)        3. Biventricular cardiac pacemaker in situ          -I discussed with him that with very low burden of AF, longest a few minutes, risk of emboli low but not zero but with hemorrhage from diverticula, risk benefit ration would favor cessation of OAC  -If longer episodes in the hours range seen then we can consider alternatives like CARLOS EDUARDO closure  -F/u in 3 mo    Alda Winslow MD    "

## 2023-12-21 LAB — EKG IMPRESSION: NORMAL

## 2023-12-30 LAB — EKG IMPRESSION: NORMAL

## 2023-12-30 PROCEDURE — 93010 ELECTROCARDIOGRAM REPORT: CPT | Mod: 59 | Performed by: INTERNAL MEDICINE

## 2024-01-15 ENCOUNTER — NON-PROVIDER VISIT (OUTPATIENT)
Dept: CARDIOLOGY | Facility: MEDICAL CENTER | Age: 74
End: 2024-01-15
Payer: MEDICARE

## 2024-01-15 PROCEDURE — 93294 REM INTERROG EVL PM/LDLS PM: CPT | Performed by: INTERNAL MEDICINE

## 2024-01-15 NOTE — CARDIAC REMOTE MONITOR - SCAN
Device transmission reviewed. Device demonstrated appropriate function.       Electronically Signed by: Godfrey Sanchez M.D.    1/15/2024  1:36 PM

## 2024-03-11 ENCOUNTER — OFFICE VISIT (OUTPATIENT)
Dept: CARDIOLOGY | Facility: MEDICAL CENTER | Age: 74
End: 2024-03-11
Attending: INTERNAL MEDICINE
Payer: MEDICARE

## 2024-03-11 VITALS
RESPIRATION RATE: 14 BRPM | WEIGHT: 199 LBS | BODY MASS INDEX: 29.47 KG/M2 | OXYGEN SATURATION: 94 % | HEART RATE: 90 BPM | HEIGHT: 69 IN | SYSTOLIC BLOOD PRESSURE: 114 MMHG | DIASTOLIC BLOOD PRESSURE: 62 MMHG

## 2024-03-11 DIAGNOSIS — Z95.0 BIVENTRICULAR CARDIAC PACEMAKER IN SITU: ICD-10-CM

## 2024-03-11 DIAGNOSIS — I48.0 PAROXYSMAL ATRIAL FIBRILLATION (HCC): ICD-10-CM

## 2024-03-11 DIAGNOSIS — I10 PRIMARY HYPERTENSION: ICD-10-CM

## 2024-03-11 DIAGNOSIS — Z95.0 CARDIAC PACEMAKER IN SITU: ICD-10-CM

## 2024-03-11 PROCEDURE — 99214 OFFICE O/P EST MOD 30 MIN: CPT | Mod: 25 | Performed by: INTERNAL MEDICINE

## 2024-03-11 PROCEDURE — 93010 ELECTROCARDIOGRAM REPORT: CPT | Performed by: INTERNAL MEDICINE

## 2024-03-11 PROCEDURE — 3078F DIAST BP <80 MM HG: CPT | Performed by: INTERNAL MEDICINE

## 2024-03-11 PROCEDURE — 93005 ELECTROCARDIOGRAM TRACING: CPT | Performed by: INTERNAL MEDICINE

## 2024-03-11 PROCEDURE — 93284 PRGRMG EVAL IMPLANTABLE DFB: CPT | Mod: 26 | Performed by: INTERNAL MEDICINE

## 2024-03-11 PROCEDURE — 99212 OFFICE O/P EST SF 10 MIN: CPT | Performed by: INTERNAL MEDICINE

## 2024-03-11 PROCEDURE — 3074F SYST BP LT 130 MM HG: CPT | Performed by: INTERNAL MEDICINE

## 2024-03-11 PROCEDURE — 93284 PRGRMG EVAL IMPLANTABLE DFB: CPT | Performed by: INTERNAL MEDICINE

## 2024-03-11 ASSESSMENT — FIBROSIS 4 INDEX: FIB4 SCORE: 1.56

## 2024-03-11 NOTE — PROGRESS NOTES
Arrhythmia Clinic Note (Established patient)    DOS: 3/11/2024    Chief complaint/Reason for consult: F/u AF and CRT-P    Interval History:  Pt is a 72 yo M. History of complete heart block (complication of remote AVNRT ablation), AF s/p prior PVI with us, pacemaker and chronic systolic CHF s/p upgrade to CRT-P device. Had lower GI bleed 2/2 to diverticula, with very minimal AF burden post ablation OAC was stopped. Here for device check and f/u. No complaints today.    ROS (+ highlighted in red):  General--Negative for fatigue, weight loss or weight gain  Cardiovascular--Negative for CP, orthopnea, PND    Past Medical History:   Diagnosis Date    A-fib (HCC) 09/2023    Status post ablation by Dr. Winslow.    AV block 07/01/2015    Status post PPM implantation.    AVNRT (AV hitesh re-entry tachycardia)     Bleeding from the nose     Cancer (HCC)     prostate cancer    Chronic anticoagulation     GERD (gastroesophageal reflux disease)      Heart burn     Hyperlipemia     Hypertension     Pacemaker     Urinary incontinence        Past Surgical History:   Procedure Laterality Date    STRABISMUS REPAIR Bilateral 8/3/2023    Procedure: BILATERAL MEDIAL RECTUS RECESS, LEFT INFERIOR OBLIQUE TRANSPOSITON;  Surgeon: Etelvina Barbour M.D.;  Location: SURGERY SAME DAY [facility];  Service: Ophthalmology    OTHER  04/2023    prostactectomy    RECOVERY  07/01/2015    Procedure:  CATH LAB  PM INSERT [facility] ICD9: 426.13;  Surgeon: Recoveryonly Surgery;  [place] [facility] [place]  Service:     PACEMAKER INSERTION  07/01/2015    [facility] [facility] [employer] DR #[id number] implanted by Dr. Sanchez.    APPENDECTOMY      INGUINAL HERNIA REPAIR BILATERAL      PILONIDAL CYST EXCISION         Social History     Socioeconomic History    Marital status:      Spouse name: Not on file    Number of children: Not on file    Years of education: Not on file    Highest education level: Not on file   Occupational History    Not on file    Tobacco Use    Smoking status: Never    Smokeless tobacco: Never    Tobacco comments:     Chew was over 35 years ago   Vaping Use    Vaping Use: Never used   Substance and Sexual Activity    Alcohol use: Yes     Alcohol/week: 1.2 oz     Types: 1 Glasses of wine, 1 Cans of beer per week     Comment: LESS THAN WEEKLY    Drug use: No     Types: Inhaled     Comment: smoked MJ in college, none since then.    Sexual activity: Yes     Partners: Female, Male   Other Topics Concern    Not on file   Social History Narrative    Not on file     Social Determinants of Health     Financial Resource Strain: Not on file   Food Insecurity: Not on file   Transportation Needs: Not on file   Physical Activity: Not on file   Stress: Not on file   Social Connections: Not on file   Intimate Partner Violence: Not on file   Housing Stability: Not on file       Family History   Problem Relation Age of Onset    Heart Disease Mother     Diabetes Mother     Cancer Father 86        Pancreatitic       No Known Allergies    Current Outpatient Medications   Medication Sig Dispense Refill    VITAMIN D PO Take  by mouth every day.      Ascorbic Acid (VITAMIN C PO) Take  by mouth every day.      simvastatin (ZOCOR) 40 MG Tab Take 1 Tablet by mouth every evening. 90 Tablet 3    lisinopril (PRINIVIL) 10 MG Tab Take 1 Tablet by mouth every evening. 90 Tablet 3    Multiple Vitamins-Minerals (CENTRUM SILVER ULTRA MENS PO) Take 1 Tab by mouth every evening.      Ferrous Sulfate (IRON PO) Take  by mouth.      metoprolol SR (TOPROL XL) 25 MG TABLET SR 24 HR Take 1 Tablet by mouth every day. (Patient not taking: Reported on 3/11/2024) 100 Tablet 4    pantoprazole (PROTONIX) 40 MG Tablet Delayed Response Take 40 mg by mouth every evening.       No current facility-administered medications for this visit.       Physical Exam:  Vitals:    03/11/24 0756   BP: 114/62   BP Location: Left arm   Patient Position: Sitting   BP Cuff Size: Adult   Pulse: 90   Resp: 14  "  SpO2: 94%   Weight: 90.3 kg (199 lb)   Height: 1.753 m (5' 9\")     General appearance: NAD, conversant  HEENT: PERRL, neck is supple with FROM  Lungs: Clear to auscultation, normal respiratory effort  CV: RRR, no murmurs/rubs/gallops, no JVD  Abdomen: Soft, non-tender with normal bowel sounds  Extremities: No peripheral edema, no clubbing or cyanosis  Skin: No rash, lesions, or ulcers  Psych: Alert and oriented to person, place and time    Data:  Labs reviewed    Prior echo/stress reviewed:  LVEF 50-55%     EKG interpreted by me:  AsVp (no BiV capture)    Impression/Plan:  1. Paroxysmal atrial fibrillation (HCC)  EKG      2. Cardiac pacemaker in situ  DX-CHEST-2 VIEWS      3. Primary hypertension        4. Biventricular cardiac pacemaker in situ        -LV threshold has gone up considerably  -We have increased output to 5 at 0.4 ms with capture  -CXR of device      Alda Winslow MD    "

## 2024-03-12 ENCOUNTER — HOSPITAL ENCOUNTER (OUTPATIENT)
Dept: RADIOLOGY | Facility: MEDICAL CENTER | Age: 74
End: 2024-03-12
Attending: INTERNAL MEDICINE
Payer: MEDICARE

## 2024-03-12 DIAGNOSIS — Z95.0 CARDIAC PACEMAKER IN SITU: ICD-10-CM

## 2024-03-12 PROCEDURE — 71046 X-RAY EXAM CHEST 2 VIEWS: CPT

## 2024-03-30 LAB — EKG IMPRESSION: NORMAL

## 2024-04-15 ENCOUNTER — NON-PROVIDER VISIT (OUTPATIENT)
Dept: CARDIOLOGY | Facility: MEDICAL CENTER | Age: 74
End: 2024-04-15
Payer: MEDICARE

## 2024-04-15 PROCEDURE — 93294 REM INTERROG EVL PM/LDLS PM: CPT | Performed by: INTERNAL MEDICINE

## 2024-04-15 NOTE — CARDIAC REMOTE MONITOR - SCAN
Device transmission reviewed. Device demonstrated appropriate function.       Electronically Signed by: Godfrey Sanchez M.D.    4/15/2024  12:21 PM

## 2024-07-05 ENCOUNTER — TELEPHONE (OUTPATIENT)
Dept: CARDIOLOGY | Facility: MEDICAL CENTER | Age: 74
End: 2024-07-05
Payer: MEDICARE

## 2024-07-15 ENCOUNTER — NON-PROVIDER VISIT (OUTPATIENT)
Dept: CARDIOLOGY | Facility: MEDICAL CENTER | Age: 74
End: 2024-07-15
Payer: MEDICARE

## 2024-07-15 PROCEDURE — 93294 REM INTERROG EVL PM/LDLS PM: CPT | Performed by: INTERNAL MEDICINE

## 2024-07-18 DIAGNOSIS — I48.0 PAROXYSMAL ATRIAL FIBRILLATION (HCC): ICD-10-CM

## 2024-07-19 RX ORDER — METOPROLOL SUCCINATE 25 MG/1
25 TABLET, EXTENDED RELEASE ORAL DAILY
Qty: 90 TABLET | Refills: 2 | Status: SHIPPED | OUTPATIENT
Start: 2024-07-19

## 2024-08-12 ENCOUNTER — OFFICE VISIT (OUTPATIENT)
Dept: CARDIOLOGY | Facility: MEDICAL CENTER | Age: 74
End: 2024-08-12
Attending: INTERNAL MEDICINE
Payer: MEDICARE

## 2024-08-12 VITALS
SYSTOLIC BLOOD PRESSURE: 124 MMHG | DIASTOLIC BLOOD PRESSURE: 86 MMHG | RESPIRATION RATE: 14 BRPM | BODY MASS INDEX: 29.47 KG/M2 | OXYGEN SATURATION: 95 % | HEART RATE: 82 BPM | HEIGHT: 69 IN | WEIGHT: 199 LBS

## 2024-08-12 DIAGNOSIS — Z95.0 BIVENTRICULAR CARDIAC PACEMAKER IN SITU: ICD-10-CM

## 2024-08-12 DIAGNOSIS — I10 PRIMARY HYPERTENSION: ICD-10-CM

## 2024-08-12 DIAGNOSIS — I48.0 PAROXYSMAL ATRIAL FIBRILLATION (HCC): ICD-10-CM

## 2024-08-12 PROCEDURE — 93281 PM DEVICE PROGR EVAL MULTI: CPT | Mod: 26 | Performed by: INTERNAL MEDICINE

## 2024-08-12 PROCEDURE — 93005 ELECTROCARDIOGRAM TRACING: CPT | Performed by: INTERNAL MEDICINE

## 2024-08-12 PROCEDURE — 99214 OFFICE O/P EST MOD 30 MIN: CPT | Mod: 25 | Performed by: INTERNAL MEDICINE

## 2024-08-12 PROCEDURE — 99212 OFFICE O/P EST SF 10 MIN: CPT | Performed by: INTERNAL MEDICINE

## 2024-08-12 PROCEDURE — 3079F DIAST BP 80-89 MM HG: CPT | Performed by: INTERNAL MEDICINE

## 2024-08-12 PROCEDURE — 3074F SYST BP LT 130 MM HG: CPT | Performed by: INTERNAL MEDICINE

## 2024-08-12 PROCEDURE — 93281 PM DEVICE PROGR EVAL MULTI: CPT | Performed by: INTERNAL MEDICINE

## 2024-08-12 RX ORDER — ASPIRIN 81 MG/1
81 TABLET ORAL DAILY
COMMUNITY

## 2024-08-12 ASSESSMENT — FIBROSIS 4 INDEX: FIB4 SCORE: 1.58

## 2024-08-29 LAB — EKG IMPRESSION: NORMAL

## 2025-01-13 ENCOUNTER — TELEPHONE (OUTPATIENT)
Dept: CARDIOLOGY | Facility: MEDICAL CENTER | Age: 75
End: 2025-01-13
Payer: MEDICARE

## 2025-01-13 ENCOUNTER — NON-PROVIDER VISIT (OUTPATIENT)
Dept: CARDIOLOGY | Facility: MEDICAL CENTER | Age: 75
End: 2025-01-13
Payer: MEDICARE

## 2025-01-13 PROCEDURE — 93294 REM INTERROG EVL PM/LDLS PM: CPT | Performed by: INTERNAL MEDICINE

## 2025-01-13 NOTE — TELEPHONE ENCOUNTER
SHERRY, upcoming appt scheduled 1/16/2025 - remote transmission received via home monitor, device shows multiple short AF episodes, longest episode lasted approx 3 days 7 hours, onset 12/1/2024, pt not on OAC due to GI bleed and low AF burden, full report scanned into Media.

## 2025-01-13 NOTE — CARDIAC REMOTE MONITOR - SCAN
Device transmission reviewed. Device demonstrated appropriate function.       Electronically Signed by: Alda Winslow M.D.    1/22/2025  10:27 AM

## 2025-01-14 NOTE — TELEPHONE ENCOUNTER
Alda Winslow M.D.  You; Valarie Wynn, Med Ass't11 minutes ago (10:39 AM)       Probably schedule office visit to discuss WATCHMAN, consider restarting OAC in the interim     Noted, pt scheduled 1/16/25 with SS.

## 2025-01-16 ENCOUNTER — TELEPHONE (OUTPATIENT)
Dept: CARDIOLOGY | Facility: MEDICAL CENTER | Age: 75
End: 2025-01-16

## 2025-01-16 ENCOUNTER — OFFICE VISIT (OUTPATIENT)
Dept: CARDIOLOGY | Facility: MEDICAL CENTER | Age: 75
End: 2025-01-16
Attending: INTERNAL MEDICINE
Payer: MEDICARE

## 2025-01-16 VITALS
HEIGHT: 69 IN | RESPIRATION RATE: 16 BRPM | BODY MASS INDEX: 30.21 KG/M2 | SYSTOLIC BLOOD PRESSURE: 116 MMHG | HEART RATE: 71 BPM | DIASTOLIC BLOOD PRESSURE: 72 MMHG | OXYGEN SATURATION: 95 % | WEIGHT: 204 LBS

## 2025-01-16 DIAGNOSIS — Z95.0 BIVENTRICULAR CARDIAC PACEMAKER IN SITU: ICD-10-CM

## 2025-01-16 DIAGNOSIS — I48.0 PAROXYSMAL ATRIAL FIBRILLATION (HCC): ICD-10-CM

## 2025-01-16 DIAGNOSIS — I44.1 SECOND DEGREE AV BLOCK: ICD-10-CM

## 2025-01-16 LAB — EKG IMPRESSION: NORMAL

## 2025-01-16 PROCEDURE — 3074F SYST BP LT 130 MM HG: CPT | Performed by: INTERNAL MEDICINE

## 2025-01-16 PROCEDURE — 93005 ELECTROCARDIOGRAM TRACING: CPT | Mod: TC | Performed by: INTERNAL MEDICINE

## 2025-01-16 PROCEDURE — 3078F DIAST BP <80 MM HG: CPT | Performed by: INTERNAL MEDICINE

## 2025-01-16 PROCEDURE — 99212 OFFICE O/P EST SF 10 MIN: CPT | Performed by: INTERNAL MEDICINE

## 2025-01-16 PROCEDURE — 99215 OFFICE O/P EST HI 40 MIN: CPT | Mod: 25 | Performed by: INTERNAL MEDICINE

## 2025-01-16 PROCEDURE — 93281 PM DEVICE PROGR EVAL MULTI: CPT | Performed by: INTERNAL MEDICINE

## 2025-01-16 PROCEDURE — 93281 PM DEVICE PROGR EVAL MULTI: CPT | Mod: 26 | Performed by: INTERNAL MEDICINE

## 2025-01-16 ASSESSMENT — FIBROSIS 4 INDEX: FIB4 SCORE: 1.58

## 2025-01-16 NOTE — TELEPHONE ENCOUNTER
Carlie,    Can you please enter the order for watchman and an additional ELENA order?    Thank You,  Tanvi

## 2025-01-16 NOTE — TELEPHONE ENCOUNTER
----- Message from Physician Alda Winslow M.D. sent at 1/16/2025 12:48 PM PST -----  Let's schedule AF ablation with carto + PFA with WATCHMAN same time. Restart the Eliquis 2 days prior to procedure. Nothing to hold.    Alda

## 2025-01-16 NOTE — PROGRESS NOTES
Arrhythmia Clinic Note (Established patient)    DOS: 1/16/2025    Chief complaint/Reason for consult: F/u AF    Interval History:  Pt is a 73 yo history of HTN, NICM, AVNRT ablation complicated by complete heart block, s/p upgrade of his PPM to CRT-P. Had AF s/p prior PV isolation. Off OAC since due to history of diverticular lower GI bleeding. Had been travelling, recurrent AF episode in November > 48 hours duration. Had symptoms but not aware lasted that long. Hesitant to go back on OAC indefinitely with prior history of lower GI bleeding.    ROS (+ highlighted in red):  General--Negative for fatigue, weight loss or weight gain  Cardiovascular--Negative for CP, orthopnea, PND    Past Medical History:   Diagnosis Date    A-fib (HCC) 09/2023    Status post ablation by Dr. Winslow.    AV block 07/01/2015    Status post PPM implantation.    AVNRT (AV hitesh re-entry tachycardia) (HCC)     Bleeding from the nose     Cancer (HCC)     prostate cancer    Chronic anticoagulation     GERD (gastroesophageal reflux disease)      Heart burn     Hyperlipemia     Hypertension     Pacemaker     Urinary incontinence        Past Surgical History:   Procedure Laterality Date    STRABISMUS REPAIR Bilateral 8/3/2023    Procedure: BILATERAL MEDIAL RECTUS RECESS, LEFT INFERIOR OBLIQUE TRANSPOSITON;  Surgeon: Etelvina Barbour M.D.;  Location: SURGERY SAME DAY [facility];  Service: Ophthalmology    OTHER  04/2023    prostactectomy    RECOVERY  07/01/2015    Procedure:  CATH LAB  PM INSERT [facility] ICD9: 426.13;  Surgeon: Recoveryonly Surgery;  Location: SURGERY PRE-POST PROC UNIT [facility] [place];  Service:     PACEMAKER INSERTION  07/01/2015    [employer]  #[id number] implanted by Dr. Sanchez.    APPENDECTOMY      INGUINAL HERNIA REPAIR BILATERAL      PILONIDAL CYST EXCISION         Social History     Socioeconomic History    Marital status:      Spouse name: Not on file    Number of children: Not on file    Years of education: Not on  file    Highest education level: Not on file   Occupational History    Not on file   Tobacco Use    Smoking status: Never    Smokeless tobacco: Never    Tobacco comments:     Chew was over 35 years ago   Vaping Use    Vaping status: Never Used   Substance and Sexual Activity    Alcohol use: Yes     Alcohol/week: 1.2 oz     Types: 1 Glasses of wine, 1 Cans of beer per week     Comment: LESS THAN WEEKLY    Drug use: No     Types: Inhaled     Comment: smoked MJ in college, none since then.    Sexual activity: Yes     Partners: Female, Male   Other Topics Concern    Not on file   Social History Narrative    Not on file     Social Drivers of Health     Financial Resource Strain: Not on file   Food Insecurity: Not on file   Transportation Needs: Unknown (12/3/2023)    [facility]    PRAPARE - Transportation     Lack of Transportation (Medical): No     Lack of Transportation (Non-Medical): Not on file   Physical Activity: Not on file   Stress: Not on file   Social Connections: Not on file   Intimate Partner Violence: Not on file   Housing Stability: Not on file       Family History   Problem Relation Age of Onset    Heart Disease Mother     Diabetes Mother     Cancer Father 86        Pancreatitic       No Known Allergies    Current Outpatient Medications   Medication Sig Dispense Refill    aspirin 81 MG EC tablet Take 81 mg by mouth every day.      metoprolol SR (TOPROL XL) 25 MG TABLET SR 24 HR Take 1 tablet by mouth once daily 90 Tablet 2    Ferrous Sulfate (IRON PO) Take  by mouth.      VITAMIN D PO Take  by mouth every day.      Ascorbic Acid (VITAMIN C PO) Take  by mouth every day.      simvastatin (ZOCOR) 40 MG Tab Take 1 Tablet by mouth every evening. 90 Tablet 3    lisinopril (PRINIVIL) 10 MG Tab Take 1 Tablet by mouth every evening. 90 Tablet 3    Multiple Vitamins-Minerals (CENTRUM SILVER ULTRA MENS PO) Take 1 Tab by mouth every evening.      pantoprazole (PROTONIX) 40 MG  "Tablet Delayed Response Take 40 mg by mouth every evening. (Patient not taking: Reported on 1/16/2025)       No current facility-administered medications for this visit.       Physical Exam:  Vitals:    01/16/25 0751   BP: 116/72   BP Location: Left arm   Patient Position: Sitting   BP Cuff Size: Adult   Pulse: 71   Resp: 16   SpO2: 95%   Weight: 92.5 kg (204 lb)   Height: 1.753 m (5' 9\")     General appearance: NAD, conversant  HEENT: PERRL, neck is supple with FROM  Lungs: Clear to auscultation, normal respiratory effort  CV: RRR, no murmurs/rubs/gallops, no JVD  Abdomen: Soft, non-tender with normal bowel sounds  Extremities: No peripheral edema, no clubbing or cyanosis  Skin: No rash, lesions, or ulcers  Psych: Alert and oriented to person, place and time    Data:  Labs reviewed    Prior echo/stress reviewed:  LVEF 55-60%    EKG interpreted by me:  AV paced    Impression/Plan:  1. Paroxysmal atrial fibrillation (HCC)  EKG      2. Biventricular cardiac pacemaker in situ        3. Second degree AV block          -Device check showing appropriate function, elevated LV threshold but stable  -AF episode reviewed, clearly real AF  -I discussed stroke risk given CHADSVasc of 3-4 (dependent on counting the NICM), and the stroke risk associated with such and options including potentially CARLOS EDUARDO closure in setting of prior GI bleeding  -He is agree-able to proceeding with closure, we will plan on concurrent repeat ablation at the time to check the PVs and re-isolate + any additional targets as appropriate at the time    Alda Winslow MD    "

## 2025-01-20 NOTE — TELEPHONE ENCOUNTER
Patient scheduled for watchman/ablation w/ELENA on 5-9-25 with Dr. Winslow. Patient has been instructed to check in at 5:30 for 7:30 case time. Restart Eliquis 2 days before. Message sent to authorizations. Andria with Watchman notified. Emely with [facility] notified. Emailed Fartun sequeira PFA

## 2025-01-20 NOTE — TELEPHONE ENCOUNTER
Patient scheduled for post watchman ELENA on 6-20-25 with Dr. Pennington. Patient has been instructed to check in at 7:00 for 9:00 case time. Message sent to khadijah POWELL to Dr. Pennington

## 2025-01-23 NOTE — TELEPHONE ENCOUNTER
Dr. Winslow,    Per Ed, the last couple of procedures he's had, he was readmitted post procedurally with inflammation around his heart. He is wondering if there is anything that can be done pre procedurally to reduce the risk of this happening again.    Please advise    Thank You,  Tanvi

## 2025-01-23 NOTE — TELEPHONE ENCOUNTER
S/w patient regarding SS recommendations below. Patient verbalized understanding, no further questions at this time.

## 2025-01-23 NOTE — TELEPHONE ENCOUNTER
Alda Winslow M.D.  You; Tanvi Nguyen, Med Ass't31 minutes ago (1:25 PM)       The PFA is less prone to inflammation than RF, different technology should minimize, would not recommend prophylaxis prior

## 2025-01-29 ENCOUNTER — PATIENT MESSAGE (OUTPATIENT)
Dept: CARDIOLOGY | Facility: MEDICAL CENTER | Age: 75
End: 2025-01-29
Payer: MEDICARE

## 2025-01-29 DIAGNOSIS — I48.0 PAROXYSMAL ATRIAL FIBRILLATION (HCC): ICD-10-CM

## 2025-01-29 NOTE — TELEPHONE ENCOUNTER
RX coordinators- Please see patient question below, is there anyway we could help with the cost of his Eliquis? Thank you.

## 2025-04-14 ENCOUNTER — NON-PROVIDER VISIT (OUTPATIENT)
Dept: CARDIOLOGY | Facility: MEDICAL CENTER | Age: 75
End: 2025-04-14
Payer: MEDICARE

## 2025-04-14 PROCEDURE — 93294 REM INTERROG EVL PM/LDLS PM: CPT | Performed by: INTERNAL MEDICINE

## 2025-04-15 NOTE — CARDIAC REMOTE MONITOR - SCAN
Device transmission reviewed. Device demonstrated appropriate function.       Electronically Signed by: Alda Winslow M.D.    4/19/2025  11:48 PM

## 2025-04-16 ENCOUNTER — APPOINTMENT (OUTPATIENT)
Dept: ADMISSIONS | Facility: MEDICAL CENTER | Age: 75
End: 2025-04-16
Attending: INTERNAL MEDICINE
Payer: MEDICARE

## 2025-04-20 LAB — EKG IMPRESSION: NORMAL

## 2025-04-23 ENCOUNTER — PRE-ADMISSION TESTING (OUTPATIENT)
Dept: ADMISSIONS | Facility: MEDICAL CENTER | Age: 75
End: 2025-04-23
Attending: INTERNAL MEDICINE
Payer: MEDICARE

## 2025-04-23 DIAGNOSIS — Z01.810 PRE-OPERATIVE CARDIOVASCULAR EXAMINATION: ICD-10-CM

## 2025-04-23 DIAGNOSIS — Z01.812 PRE-OPERATIVE LABORATORY EXAMINATION: ICD-10-CM

## 2025-04-23 RX ORDER — FAMOTIDINE 20 MG/1
1 TABLET, FILM COATED ORAL PRN
COMMUNITY

## 2025-04-23 NOTE — PREADMIT AVS NOTE
Current Medications   Medication Instructions    famotidine (PEPCID) 20 MG Tab CONTINUE TAKING MEDICATION AS PRESCRIBED.     apixaban (ELIQUIS) 5mg Tab FOLLOW INSTRUCTIONS FROM SURGEON OR SPECIALIST    metoprolol SR (TOPROL XL) 25 MG TABLET SR 24 HR CONTINUE TAKING MEDICATION AS PRESCRIBED.     VITAMIN D PO HOLD 7 DAYS PRIOR TO SURGERY/PROCEDURE.     Ascorbic Acid (VITAMIN C PO) HOLD 7 DAYS PRIOR TO SURGERY/PROCEDURE.     simvastatin (ZOCOR) 40 MG Tab CONTINUE TAKING MEDICATION AS PRESCRIBED.     lisinopril (PRINIVIL) 10 MG Tab HOLD 24 HOURS PRIOR TO SURGERY/PROCEDURE.     Multiple Vitamins-Minerals (CENTRUM SILVER ULTRA MENS PO) HOLD 7 DAYS PRIOR TO SURGERY/PROCEDURE.

## 2025-04-28 PROCEDURE — RXMED WILLOW AMBULATORY MEDICATION CHARGE: Performed by: INTERNAL MEDICINE

## 2025-04-29 DIAGNOSIS — I48.0 PAROXYSMAL ATRIAL FIBRILLATION (HCC): ICD-10-CM

## 2025-04-30 ENCOUNTER — PHARMACY VISIT (OUTPATIENT)
Dept: PHARMACY | Facility: MEDICAL CENTER | Age: 75
End: 2025-04-30
Payer: COMMERCIAL

## 2025-04-30 RX ORDER — METOPROLOL SUCCINATE 25 MG/1
25 TABLET, EXTENDED RELEASE ORAL DAILY
Qty: 90 TABLET | Refills: 3 | Status: SHIPPED | OUTPATIENT
Start: 2025-04-30

## 2025-04-30 NOTE — TELEPHONE ENCOUNTER
Is the patient due for a refill? Yes    Was the patient seen the last 15 months? Yes    Date of last office visit: 01/16/2025    Does the patient have an upcoming appointment?  Yes   If yes, When? 05/09/2025    Provider to refill:yes    Does the patient have residential Plus and need 100-day supply? (This applies to ALL medications) Patient does not have SCP

## 2025-05-06 ENCOUNTER — PRE-ADMISSION TESTING (OUTPATIENT)
Dept: ADMISSIONS | Facility: MEDICAL CENTER | Age: 75
End: 2025-05-06
Attending: INTERNAL MEDICINE
Payer: MEDICARE

## 2025-05-06 DIAGNOSIS — Z01.810 PRE-OPERATIVE CARDIOVASCULAR EXAMINATION: ICD-10-CM

## 2025-05-06 DIAGNOSIS — Z01.812 PRE-OPERATIVE LABORATORY EXAMINATION: ICD-10-CM

## 2025-05-06 LAB
ANION GAP SERPL CALC-SCNC: 7 MMOL/L (ref 7–16)
APTT PPP: 26.4 SEC (ref 24.7–36)
BASOPHILS # BLD AUTO: 0.7 % (ref 0–1.8)
BASOPHILS # BLD: 0.05 K/UL (ref 0–0.12)
BUN SERPL-MCNC: 27 MG/DL (ref 8–22)
CALCIUM SERPL-MCNC: 9.1 MG/DL (ref 8.5–10.5)
CHLORIDE SERPL-SCNC: 109 MMOL/L (ref 96–112)
CO2 SERPL-SCNC: 26 MMOL/L (ref 20–33)
CREAT SERPL-MCNC: 1.01 MG/DL (ref 0.5–1.4)
EKG IMPRESSION: NORMAL
EOSINOPHIL # BLD AUTO: 0.11 K/UL (ref 0–0.51)
EOSINOPHIL NFR BLD: 1.6 % (ref 0–6.9)
ERYTHROCYTE [DISTWIDTH] IN BLOOD BY AUTOMATED COUNT: 48.5 FL (ref 35.9–50)
EST. AVERAGE GLUCOSE BLD GHB EST-MCNC: 105 MG/DL
GFR SERPLBLD CREATININE-BSD FMLA CKD-EPI: 77 ML/MIN/1.73 M 2
GLUCOSE SERPL-MCNC: 91 MG/DL (ref 65–99)
HBA1C MFR BLD: 5.3 % (ref 4–5.6)
HCT VFR BLD AUTO: 43.4 % (ref 42–52)
HGB BLD-MCNC: 14.7 G/DL (ref 14–18)
IMM GRANULOCYTES # BLD AUTO: 0.03 K/UL (ref 0–0.11)
IMM GRANULOCYTES NFR BLD AUTO: 0.4 % (ref 0–0.9)
INR PPP: 1.11 (ref 0.87–1.13)
LYMPHOCYTES # BLD AUTO: 1.87 K/UL (ref 1–4.8)
LYMPHOCYTES NFR BLD: 26.4 % (ref 22–41)
MCH RBC QN AUTO: 33.3 PG (ref 27–33)
MCHC RBC AUTO-ENTMCNC: 33.9 G/DL (ref 32.3–36.5)
MCV RBC AUTO: 98.2 FL (ref 81.4–97.8)
MONOCYTES # BLD AUTO: 0.7 K/UL (ref 0–0.85)
MONOCYTES NFR BLD AUTO: 9.9 % (ref 0–13.4)
NEUTROPHILS # BLD AUTO: 4.33 K/UL (ref 1.82–7.42)
NEUTROPHILS NFR BLD: 61 % (ref 44–72)
NRBC # BLD AUTO: 0 K/UL
NRBC BLD-RTO: 0 /100 WBC (ref 0–0.2)
PLATELET # BLD AUTO: 202 K/UL (ref 164–446)
PMV BLD AUTO: 9.1 FL (ref 9–12.9)
POTASSIUM SERPL-SCNC: 4.5 MMOL/L (ref 3.6–5.5)
PROTHROMBIN TIME: 14.4 SEC (ref 12–14.6)
RBC # BLD AUTO: 4.42 M/UL (ref 4.7–6.1)
SODIUM SERPL-SCNC: 142 MMOL/L (ref 135–145)
WBC # BLD AUTO: 7.1 K/UL (ref 4.8–10.8)

## 2025-05-06 PROCEDURE — 93005 ELECTROCARDIOGRAM TRACING: CPT | Mod: TC

## 2025-05-06 PROCEDURE — 85730 THROMBOPLASTIN TIME PARTIAL: CPT

## 2025-05-06 PROCEDURE — 85025 COMPLETE CBC W/AUTO DIFF WBC: CPT

## 2025-05-06 PROCEDURE — 83036 HEMOGLOBIN GLYCOSYLATED A1C: CPT | Mod: GA

## 2025-05-06 PROCEDURE — 36415 COLL VENOUS BLD VENIPUNCTURE: CPT

## 2025-05-06 PROCEDURE — 93010 ELECTROCARDIOGRAM REPORT: CPT | Performed by: INTERNAL MEDICINE

## 2025-05-06 PROCEDURE — 85610 PROTHROMBIN TIME: CPT

## 2025-05-06 PROCEDURE — 80048 BASIC METABOLIC PNL TOTAL CA: CPT

## 2025-05-09 ENCOUNTER — HOSPITAL ENCOUNTER (INPATIENT)
Facility: MEDICAL CENTER | Age: 75
LOS: 1 days | End: 2025-05-10
Attending: INTERNAL MEDICINE | Admitting: INTERNAL MEDICINE
Payer: MEDICARE

## 2025-05-09 ENCOUNTER — APPOINTMENT (OUTPATIENT)
Dept: CARDIOLOGY | Facility: MEDICAL CENTER | Age: 75
End: 2025-05-09
Attending: INTERNAL MEDICINE
Payer: MEDICARE

## 2025-05-09 ENCOUNTER — ANESTHESIA EVENT (OUTPATIENT)
Dept: CARDIOLOGY | Facility: MEDICAL CENTER | Age: 75
End: 2025-05-09
Payer: MEDICARE

## 2025-05-09 ENCOUNTER — ANESTHESIA (OUTPATIENT)
Dept: CARDIOLOGY | Facility: MEDICAL CENTER | Age: 75
End: 2025-05-09
Payer: MEDICARE

## 2025-05-09 DIAGNOSIS — I48.0 PAROXYSMAL ATRIAL FIBRILLATION (HCC): ICD-10-CM

## 2025-05-09 DIAGNOSIS — Z95.0 BIVENTRICULAR CARDIAC PACEMAKER IN SITU: ICD-10-CM

## 2025-05-09 LAB
ACT BLD: 239 SEC (ref 74–137)
ACT BLD: 250 SEC (ref 74–137)
ACT BLD: 251 SEC (ref 74–137)
ACT BLD: 279 SEC (ref 74–137)
EKG IMPRESSION: NORMAL

## 2025-05-09 PROCEDURE — 93657 TX L/R ATRIAL FIB ADDL: CPT | Mod: Q1 | Performed by: INTERNAL MEDICINE

## 2025-05-09 PROCEDURE — A9270 NON-COVERED ITEM OR SERVICE: HCPCS | Performed by: INTERNAL MEDICINE

## 2025-05-09 PROCEDURE — 93312 ECHO TRANSESOPHAGEAL: CPT

## 2025-05-09 PROCEDURE — 33340 PERQ CLSR TCAT L ATR APNDGE: CPT | Mod: Q0 | Performed by: INTERNAL MEDICINE

## 2025-05-09 PROCEDURE — 160015 HCHG STAT PREOP MINUTES

## 2025-05-09 PROCEDURE — 700111 HCHG RX REV CODE 636 W/ 250 OVERRIDE (IP): Performed by: ANESTHESIOLOGY

## 2025-05-09 PROCEDURE — 02K83ZZ MAP CONDUCTION MECHANISM, PERCUTANEOUS APPROACH: ICD-10-PCS | Performed by: INTERNAL MEDICINE

## 2025-05-09 PROCEDURE — 93005 ELECTROCARDIOGRAM TRACING: CPT | Mod: TC | Performed by: INTERNAL MEDICINE

## 2025-05-09 PROCEDURE — 700101 HCHG RX REV CODE 250: Performed by: ANESTHESIOLOGY

## 2025-05-09 PROCEDURE — 360979 CL-LEFT ATRIAL APPENDAGE CLOSURE

## 2025-05-09 PROCEDURE — 93656 COMPRE EP EVAL ABLTJ ATR FIB: CPT | Mod: Q1 | Performed by: INTERNAL MEDICINE

## 2025-05-09 PROCEDURE — 700117 HCHG RX CONTRAST REV CODE 255: Performed by: INTERNAL MEDICINE

## 2025-05-09 PROCEDURE — 160035 HCHG PACU - 1ST 60 MINS PHASE I

## 2025-05-09 PROCEDURE — B24BZZ4 ULTRASONOGRAPHY OF HEART WITH AORTA, TRANSESOPHAGEAL: ICD-10-PCS | Performed by: ANESTHESIOLOGY

## 2025-05-09 PROCEDURE — 700102 HCHG RX REV CODE 250 W/ 637 OVERRIDE(OP): Performed by: INTERNAL MEDICINE

## 2025-05-09 PROCEDURE — 93010 ELECTROCARDIOGRAM REPORT: CPT | Performed by: INTERNAL MEDICINE

## 2025-05-09 PROCEDURE — C1894 INTRO/SHEATH, NON-LASER: HCPCS

## 2025-05-09 PROCEDURE — 700105 HCHG RX REV CODE 258: Performed by: ANESTHESIOLOGY

## 2025-05-09 PROCEDURE — 700111 HCHG RX REV CODE 636 W/ 250 OVERRIDE (IP)

## 2025-05-09 PROCEDURE — 160047 HCHG PACU  - EA ADDL 30 MINS PHASE II

## 2025-05-09 PROCEDURE — 02L73DK OCCLUSION OF LEFT ATRIAL APPENDAGE WITH INTRALUMINAL DEVICE, PERCUTANEOUS APPROACH: ICD-10-PCS | Performed by: INTERNAL MEDICINE

## 2025-05-09 PROCEDURE — 160046 HCHG PACU - 1ST 60 MINS PHASE II

## 2025-05-09 PROCEDURE — 770020 HCHG ROOM/CARE - TELE (206)

## 2025-05-09 PROCEDURE — 02583ZZ DESTRUCTION OF CONDUCTION MECHANISM, PERCUTANEOUS APPROACH: ICD-10-PCS | Performed by: INTERNAL MEDICINE

## 2025-05-09 PROCEDURE — 93655 ICAR CATH ABLTJ DSCRT ARRHYT: CPT | Mod: Q1 | Performed by: INTERNAL MEDICINE

## 2025-05-09 PROCEDURE — 700101 HCHG RX REV CODE 250

## 2025-05-09 PROCEDURE — 160002 HCHG RECOVERY MINUTES (STAT)

## 2025-05-09 PROCEDURE — 85347 COAGULATION TIME ACTIVATED: CPT

## 2025-05-09 RX ORDER — NEOSTIGMINE METHYLSULFATE 1 MG/ML
INJECTION INTRAVENOUS PRN
Status: DISCONTINUED | OUTPATIENT
Start: 2025-05-09 | End: 2025-05-09 | Stop reason: SURG

## 2025-05-09 RX ORDER — OXYCODONE HCL 5 MG/5 ML
5 SOLUTION, ORAL ORAL
Status: DISCONTINUED | OUTPATIENT
Start: 2025-05-09 | End: 2025-05-09 | Stop reason: HOSPADM

## 2025-05-09 RX ORDER — PROTAMINE SULFATE 10 MG/ML
INJECTION, SOLUTION INTRAVENOUS
Status: COMPLETED
Start: 2025-05-09 | End: 2025-05-09

## 2025-05-09 RX ORDER — HEPARIN SODIUM 200 [USP'U]/100ML
INJECTION, SOLUTION INTRAVENOUS
Status: COMPLETED
Start: 2025-05-09 | End: 2025-05-09

## 2025-05-09 RX ORDER — ONDANSETRON 2 MG/ML
INJECTION INTRAMUSCULAR; INTRAVENOUS PRN
Status: DISCONTINUED | OUTPATIENT
Start: 2025-05-09 | End: 2025-05-09 | Stop reason: SURG

## 2025-05-09 RX ORDER — DIPHENHYDRAMINE HYDROCHLORIDE 50 MG/ML
12.5 INJECTION, SOLUTION INTRAMUSCULAR; INTRAVENOUS
Status: DISCONTINUED | OUTPATIENT
Start: 2025-05-09 | End: 2025-05-09 | Stop reason: HOSPADM

## 2025-05-09 RX ORDER — MEPERIDINE HYDROCHLORIDE 25 MG/ML
12.5 INJECTION INTRAMUSCULAR; INTRAVENOUS; SUBCUTANEOUS
Status: DISCONTINUED | OUTPATIENT
Start: 2025-05-09 | End: 2025-05-09 | Stop reason: HOSPADM

## 2025-05-09 RX ORDER — HEPARIN SODIUM 1000 [USP'U]/ML
INJECTION, SOLUTION INTRAVENOUS; SUBCUTANEOUS
Status: COMPLETED
Start: 2025-05-09 | End: 2025-05-09

## 2025-05-09 RX ORDER — HYDROMORPHONE HYDROCHLORIDE 1 MG/ML
0.1 INJECTION, SOLUTION INTRAMUSCULAR; INTRAVENOUS; SUBCUTANEOUS
Status: DISCONTINUED | OUTPATIENT
Start: 2025-05-09 | End: 2025-05-09 | Stop reason: HOSPADM

## 2025-05-09 RX ORDER — LISINOPRIL 20 MG/1
10 TABLET ORAL EVERY EVENING
Status: DISCONTINUED | OUTPATIENT
Start: 2025-05-09 | End: 2025-05-10 | Stop reason: HOSPADM

## 2025-05-09 RX ORDER — ACETAMINOPHEN 325 MG/1
650 TABLET ORAL EVERY 4 HOURS PRN
Status: DISCONTINUED | OUTPATIENT
Start: 2025-05-09 | End: 2025-05-10 | Stop reason: HOSPADM

## 2025-05-09 RX ORDER — OXYCODONE HCL 5 MG/5 ML
10 SOLUTION, ORAL ORAL
Status: DISCONTINUED | OUTPATIENT
Start: 2025-05-09 | End: 2025-05-09 | Stop reason: HOSPADM

## 2025-05-09 RX ORDER — ONDANSETRON 2 MG/ML
4 INJECTION INTRAMUSCULAR; INTRAVENOUS
Status: DISCONTINUED | OUTPATIENT
Start: 2025-05-09 | End: 2025-05-09 | Stop reason: HOSPADM

## 2025-05-09 RX ORDER — SIMVASTATIN 40 MG
40 TABLET ORAL EVERY EVENING
Status: DISCONTINUED | OUTPATIENT
Start: 2025-05-09 | End: 2025-05-10 | Stop reason: HOSPADM

## 2025-05-09 RX ORDER — CEFAZOLIN SODIUM 1 G/3ML
INJECTION, POWDER, FOR SOLUTION INTRAMUSCULAR; INTRAVENOUS PRN
Status: DISCONTINUED | OUTPATIENT
Start: 2025-05-09 | End: 2025-05-09 | Stop reason: SURG

## 2025-05-09 RX ORDER — LIDOCAINE HYDROCHLORIDE 20 MG/ML
INJECTION, SOLUTION INFILTRATION; PERINEURAL
Status: COMPLETED
Start: 2025-05-09 | End: 2025-05-09

## 2025-05-09 RX ORDER — ALBUTEROL SULFATE 5 MG/ML
2.5 SOLUTION RESPIRATORY (INHALATION)
Status: DISCONTINUED | OUTPATIENT
Start: 2025-05-09 | End: 2025-05-09 | Stop reason: HOSPADM

## 2025-05-09 RX ORDER — DEXAMETHASONE SODIUM PHOSPHATE 4 MG/ML
INJECTION, SOLUTION INTRA-ARTICULAR; INTRALESIONAL; INTRAMUSCULAR; INTRAVENOUS; SOFT TISSUE PRN
Status: DISCONTINUED | OUTPATIENT
Start: 2025-05-09 | End: 2025-05-09 | Stop reason: SURG

## 2025-05-09 RX ORDER — SODIUM CHLORIDE, SODIUM LACTATE, POTASSIUM CHLORIDE, CALCIUM CHLORIDE 600; 310; 30; 20 MG/100ML; MG/100ML; MG/100ML; MG/100ML
INJECTION, SOLUTION INTRAVENOUS
Status: DISCONTINUED | OUTPATIENT
Start: 2025-05-09 | End: 2025-05-09 | Stop reason: SURG

## 2025-05-09 RX ORDER — METOPROLOL SUCCINATE 25 MG/1
25 TABLET, EXTENDED RELEASE ORAL DAILY
Status: DISCONTINUED | OUTPATIENT
Start: 2025-05-09 | End: 2025-05-10 | Stop reason: HOSPADM

## 2025-05-09 RX ORDER — HYDROMORPHONE HYDROCHLORIDE 1 MG/ML
0.4 INJECTION, SOLUTION INTRAMUSCULAR; INTRAVENOUS; SUBCUTANEOUS
Status: DISCONTINUED | OUTPATIENT
Start: 2025-05-09 | End: 2025-05-09 | Stop reason: HOSPADM

## 2025-05-09 RX ORDER — HYDROMORPHONE HYDROCHLORIDE 1 MG/ML
0.2 INJECTION, SOLUTION INTRAMUSCULAR; INTRAVENOUS; SUBCUTANEOUS
Status: DISCONTINUED | OUTPATIENT
Start: 2025-05-09 | End: 2025-05-09 | Stop reason: HOSPADM

## 2025-05-09 RX ORDER — HALOPERIDOL 5 MG/ML
1 INJECTION INTRAMUSCULAR
Status: DISCONTINUED | OUTPATIENT
Start: 2025-05-09 | End: 2025-05-09 | Stop reason: HOSPADM

## 2025-05-09 RX ORDER — BUPIVACAINE HYDROCHLORIDE 5 MG/ML
INJECTION, SOLUTION EPIDURAL; INTRACAUDAL; PERINEURAL
Status: COMPLETED
Start: 2025-05-09 | End: 2025-05-09

## 2025-05-09 RX ADMIN — PROPOFOL 150 MG: 10 INJECTION, EMULSION INTRAVENOUS at 08:06

## 2025-05-09 RX ADMIN — ACETAMINOPHEN 650 MG: 325 TABLET ORAL at 19:34

## 2025-05-09 RX ADMIN — SIMVASTATIN 40 MG: 40 TABLET, FILM COATED ORAL at 17:37

## 2025-05-09 RX ADMIN — FENTANYL CITRATE 100 MCG: 50 INJECTION, SOLUTION INTRAMUSCULAR; INTRAVENOUS at 09:32

## 2025-05-09 RX ADMIN — SODIUM CHLORIDE, POTASSIUM CHLORIDE, SODIUM LACTATE AND CALCIUM CHLORIDE: 600; 310; 30; 20 INJECTION, SOLUTION INTRAVENOUS at 07:53

## 2025-05-09 RX ADMIN — PROTAMINE SULFATE 30 MG: 10 INJECTION, SOLUTION INTRAVENOUS at 10:10

## 2025-05-09 RX ADMIN — IOHEXOL 15 ML: 350 INJECTION, SOLUTION INTRAVENOUS at 10:10

## 2025-05-09 RX ADMIN — ROCURONIUM BROMIDE 100 MG: 10 INJECTION, SOLUTION INTRAVENOUS at 08:06

## 2025-05-09 RX ADMIN — DEXAMETHASONE SODIUM PHOSPHATE 4 MG: 4 INJECTION INTRA-ARTICULAR; INTRALESIONAL; INTRAMUSCULAR; INTRAVENOUS; SOFT TISSUE at 08:06

## 2025-05-09 RX ADMIN — HEPARIN SODIUM 4000 UNITS: 1000 INJECTION, SOLUTION INTRAVENOUS; SUBCUTANEOUS at 08:48

## 2025-05-09 RX ADMIN — HEPARIN SODIUM 4000 UNITS: 1000 INJECTION, SOLUTION INTRAVENOUS; SUBCUTANEOUS at 09:12

## 2025-05-09 RX ADMIN — NITROGLYCERIN 0.5 MG: 20 INJECTION INTRAVENOUS at 09:26

## 2025-05-09 RX ADMIN — HEPARIN SODIUM 12000 UNITS: 1000 INJECTION, SOLUTION INTRAVENOUS; SUBCUTANEOUS at 08:26

## 2025-05-09 RX ADMIN — CEFAZOLIN 2 G: 1 INJECTION, POWDER, FOR SOLUTION INTRAMUSCULAR; INTRAVENOUS at 07:53

## 2025-05-09 RX ADMIN — BUPIVACAINE HYDROCHLORIDE: 5 INJECTION, SOLUTION EPIDURAL; INTRACAUDAL; PERINEURAL at 08:15

## 2025-05-09 RX ADMIN — HEPARIN SODIUM 4000 UNITS: 200 INJECTION, SOLUTION INTRAVENOUS at 08:15

## 2025-05-09 RX ADMIN — LIDOCAINE HYDROCHLORIDE: 20 INJECTION, SOLUTION INFILTRATION; PERINEURAL at 08:15

## 2025-05-09 RX ADMIN — SUGAMMADEX 200 MG: 100 INJECTION, SOLUTION INTRAVENOUS at 10:16

## 2025-05-09 RX ADMIN — LISINOPRIL 10 MG: 20 TABLET ORAL at 17:36

## 2025-05-09 RX ADMIN — FENTANYL CITRATE 100 MCG: 50 INJECTION, SOLUTION INTRAMUSCULAR; INTRAVENOUS at 08:15

## 2025-05-09 RX ADMIN — NEOSTIGMINE METHYLSULFATE 2 MG: 1 INJECTION INTRAVENOUS at 10:18

## 2025-05-09 RX ADMIN — APIXABAN 5 MG: 5 TABLET, FILM COATED ORAL at 17:37

## 2025-05-09 RX ADMIN — ONDANSETRON 4 MG: 2 INJECTION INTRAMUSCULAR; INTRAVENOUS at 08:06

## 2025-05-09 ASSESSMENT — CHA2DS2 SCORE
CHF OR LEFT VENTRICULAR DYSFUNCTION: NO
AGE 75 OR GREATER: YES
PRIOR STROKE OR TIA OR THROMBOEMBOLISM: NO
HYPERTENSION: YES
DIABETES: NO
SEX: MALE
VASCULAR DISEASE: NO
CHA2DS2 VASC SCORE: 3
AGE 65 TO 74: NO

## 2025-05-09 ASSESSMENT — COGNITIVE AND FUNCTIONAL STATUS - GENERAL
SUGGESTED CMS G CODE MODIFIER DAILY ACTIVITY: CH
MOBILITY SCORE: 24
DAILY ACTIVITIY SCORE: 24
SUGGESTED CMS G CODE MODIFIER MOBILITY: CH

## 2025-05-09 ASSESSMENT — PATIENT HEALTH QUESTIONNAIRE - PHQ9
2. FEELING DOWN, DEPRESSED, IRRITABLE, OR HOPELESS: NOT AT ALL
SUM OF ALL RESPONSES TO PHQ9 QUESTIONS 1 AND 2: 0
1. LITTLE INTEREST OR PLEASURE IN DOING THINGS: NOT AT ALL

## 2025-05-09 ASSESSMENT — FIBROSIS 4 INDEX: FIB4 SCORE: 1.84

## 2025-05-09 ASSESSMENT — PAIN DESCRIPTION - PAIN TYPE
TYPE: SURGICAL PAIN
TYPE: ACUTE PAIN

## 2025-05-09 ASSESSMENT — PAIN SCALES - GENERAL: PAIN_LEVEL: 3

## 2025-05-09 NOTE — ANESTHESIA POSTPROCEDURE EVALUATION
Patient: Emil Reid    Procedure Summary       Date: 05/09/25 Room / Location: [facility] - Cath Lab [facility]    Anesthesia Start: 0753 Anesthesia Stop: 1036    Procedures:       CL-EP ABLATION ATRIAL FIBRILLATION      CL-LEFT ATRIAL APPENDAGE CLOSURE Diagnosis:       Paroxysmal atrial fibrillation (HCC)      Biventricular cardiac pacemaker in situ      (See Associated Dx)      (See Associated Dx)    Scheduled Providers: Alda Winslow M.D.; Rick Jacob M.D. Responsible Provider: Rick Jacob M.D.    Anesthesia Type: general ASA Status: 3            Final Anesthesia Type: general  Last vitals  BP   Blood Pressure : 129/75    Temp   35.9 °C (96.6 °F)    Pulse   69   Resp   14    SpO2   99 %      Anesthesia Post Evaluation    Patient location during evaluation: PACU  Patient participation: complete - patient participated  Level of consciousness: awake and alert  Pain score: 3    Airway patency: patent  Anesthetic complications: no  Cardiovascular status: hemodynamically stable  Respiratory status: acceptable  Hydration status: euvolemic    PONV: none          No notable events documented.     Nurse Pain Score: 0 (NPRS)

## 2025-05-09 NOTE — ANESTHESIA PROCEDURE NOTES
Arterial Line    Performed by: Rick Jacob M.D.  Authorized by: Rick Jacob M.D.    Start Time:  5/9/2025 8:05 AM  End Time:  5/9/2025 8:06 AM  Localization: surface landmarks    Patient Location:  OR  Indication: continuous blood pressure monitoring        Catheter Size:  20 G  Seldinger Technique?: Yes    Site:  Radial artery  Line Secured:  Antimicrobial disc, tape and transparent dressing  Events: patient tolerated procedure well with no complications

## 2025-05-09 NOTE — ANESTHESIA PROCEDURE NOTES
Airway    Date/Time: 5/9/2025 8:06 AM    Performed by: Rick Jacob M.D.  Authorized by: Rick Jacob M.D.    Location:  OR  Urgency:  Elective  Indications for Airway Management:  Anesthesia      Spontaneous Ventilation: absent    Sedation Level:  Deep  Preoxygenated: Yes    Patient Position:  Sniffing  Final Airway Type:  Endotracheal airway  Final Endotracheal Airway:  ETT  Cuffed: Yes    Technique Used for Successful ETT Placement:  Direct laryngoscopy    Insertion Site:  Oral  Blade Type:  Janet  Laryngoscope Blade/Videolaryngoscope Blade Size:  3  ETT Size (mm):  7.5  Measured from:  Teeth  ETT to Teeth (cm):  21  Placement Verified by: auscultation and capnometry    Cormack-Lehane Classification:  Grade I - full view of glottis  Number of Attempts at Approach:  1

## 2025-05-09 NOTE — ANESTHESIA PREPROCEDURE EVALUATION
Date/Time: 05/09/25 0800    Scheduled providers: Alda Winslow M.D.; Rick Jacob M.D.    Procedures:       CL-EP ABLATION ATRIAL FIBRILLATION      CL-LEFT ATRIAL APPENDAGE CLOSURE    Diagnosis:       Paroxysmal atrial fibrillation (HCC) [I48.0]      Biventricular cardiac pacemaker in situ [Z95.0]    Indications:       See Associated Dx      See Associated Dx    Location: [facility] - Cath Lab [facility]            Relevant Problems   CARDIAC   (positive) AVNRT (AV hitesh re-entry tachycardia) (HCC)   (positive) Biventricular cardiac pacemaker in situ   (positive) Hypertension   (positive) Paroxysmal atrial fibrillation (HCC)   (positive) Second degree AV block      GI   (positive) GERD (gastroesophageal reflux disease)       Physical Exam    Airway   Mallampati: II  TM distance: >3 FB  Neck ROM: full       Cardiovascular - normal exam  Rhythm: regular  Rate: normal  (-) murmur     Dental - normal exam           Pulmonary - normal exam  Breath sounds clear to auscultation     Abdominal    Neurological - normal exam                   Anesthesia Plan    ASA 3       Plan - general       Airway plan will be ETT  ELENA Planned        Induction: intravenous    Postoperative Plan: Postoperative administration of opioids is intended.    Pertinent diagnostic labs and testing reviewed    Informed Consent:    Anesthetic plan and risks discussed with patient.    Use of blood products discussed with: patient whom consented to blood products.

## 2025-05-09 NOTE — DISCHARGE INSTRUCTIONS
[facility] Post Ablation Patient Instructions:  No lifting > 10 lbs x 1 week.      No soaking in baths, hot tubs, pools x 1 week.  May shower the day after discharge and take off groin dressings and leave  sites uncovered.  Continue to monitor sites daily for warmth, redness, discolored drainage.  It is common to have a small lump in the area where the cather was (usually the size of a marble); this will go away but takes approximately 6 weeks to normalize.     3.   Please take all medications as prescribed to you; please do not stop any medications prescribed post ablation unless directed by your healthcare provider.      4.   Please do not miss any doses of your blood thinner (if you have been started on, or take chronic blood thinners) without discussion with your healthcare provider first.     5.   Please walk and take deep breaths after discharge.  After discharge, if you experience neurological changes/signs of stroke or high fever you should be seen in the emergency dept.     6.   It is possible you may experience some chest discomfort or chest tightness post ablation.  This is usually secondary to inflammation and irritation of the tissues at the area of the ablation.  If this occurs, it is advised to try 400 mg of Ibuprofen with food as needed up to three times a day for a maximum of two days.  This should help to decrease pain and tissue inflammation.          **Please notify the office ([phone]) if this occurs.         ** DO NOT TAKE Ibuprofen IF HISTORY OF ALLERGY, SIGNIFICANT BLEEDING OR KIDNEY DISEASE WITHOUT DISCUSSING WITH YOUR CARDIOLOGY PROVIDER FIRST.          ** If pain becomes severe or you have additional symptoms you may need to be medically evaluated; please contact the cardiology office ([phone]) for further direction.     7. It is possible that you may experience arrhythmia/Atrial Fibrillation post ablation.  This is secondary to irritation and  inflammation of the cardiac tissues from the ablation.  If you have atrial fibrillation all day or feel poorly with it, please notify your cardiologist's via phone ([phone]) or [employer].      8.  Please contact call our office ([phone]) or message via [employer] message if you have any questions or concerns post procedurally.    9. You need to be seen for post ablation follow up 3-4 weeks post procedure. An appointment is scheduled for you.  Please contact the office ([phone]) if you need to change your appointment.        [facility] POST WATCHMAN INSTRUCTIONS  No lifting > 10 lbs x 1 week.  No baths or hot tubs x 1 week.      May shower on 5/11/2025 and take off groin dressing and leave site uncovered.  Please try to keep Steri-strip in place and allow to come off on its own.  Continue to monitor site daily for warmth, redness, discolored drainage.    3. Please do not miss any doses of your blood thinner.      4. Please keep all follow up appointments scheduled for you.     5. A transesophageal echocardiogram (ELENA) will be scheduled for you  to check the healing of the Watchman.  This procedure is completed at [facility] same day procedures.  We use sedation/anesthesia for this procedure so you will need a .     6. Please walk and take deep breaths after discharge.  After discharge, if you experience severe chest pain, shortness of breath, neurological changes, high fever, severe dizziness, trouble with catheter site needs to be seen in the emergency dept.

## 2025-05-09 NOTE — OP REPORT
[facility]  Electrophysiology Procedure Note    Procedure(s) Performed:   1) Atrial fibrillation ablation  2) Ablation of additional atrial fibrillation mechanism  3) Ablation of additional mechanism of arrhythmia (mitral flutter)  4) CARLOS EDUARDO-closure with WATCHMAN FLX PRO    Indication(s):  Paroxysmal atrial fibrillation  Atypical atrial flutter  Complete heart block  History of GI bleeding    Physician(s): Alda Winslow M.D.     Resident/Assistant(s): None     Anesthesia: General endotracheal anesthetic with Dr. Jacob     Specimen(s) Removed: None     Estimated Blood Loss:  100cc     Complications:  None     Description of Procedure:   After informed written consent, the patient was brought to the EP lab in the fasting, non-sedated state. The patient was prepped and draped in the usual sterile fashion. Femoral venous access was obtained using the modified Seldinger technique. In the right femoral vein, 3 sheaths (8,8,7 Fr) were inserted over 0.035” guidewires. Perclose x 2 was pre-deployed on an 8 Fr access. A deflectable decapolar catheter was advanced to the CS position. Baseline rhythm ApVp. An intracardiac echo (ICE) catheter was advanced to the right atrium. ICE was used to identify the atrial septum, left atrial appendage, and pulmonary veins and ld the anatomic structures to superimpose on our 3D electroanatomic map using CARTOSound. During the procedure, ICE was utilized to localize the ablation catheter, monitor for thrombus formation, and to exclude pericardial effusion. Intravenous heparin was administered to maintain -350 seconds. Transseptal left heart catheterization was performed under intracardiac echo and hemodynamic guidance using a Moz system. Mapping of the pulmonary veins and left atrium was performed using CARTO3 FAM and a deflectable multipolar mapping catheter (Biosense OctaRay). This showed reconnection of the LA posterior wall from the prior isolation along with some  reconnection of the anterior L sided audrey. The ablation line along the roof outside the LSPV was also not quite antral. We proceeded to re-isolate the PVs extending the RSPV ablation line more antral and re-isolating the PW along the re-do of the L sided PVs. We exchanged for a Faradrive sheath. A Farawave catheter was advanced to the LA. Ten applications per PV divided between olive, basket and flower configurations were delivered to isolate the PVs. Additional applications in the flower configuration were delivered along the LA roof and floor to isolate the LA posterior wall as additional substrate of AF. During isolation of the posterior wall, spontaneous atrial flutter 3:1 AV conduction  msec with proximal to distal CS conduction occurred. Repeat voltage/activation map of the LA demonstrated successful repeat PV and PW isolation and CCW activation around the mitral annulus. 0.5 mg IV nitrogylcerin was given. The Farawave in the flower configuration was applied to the lateral mitral annulus across the low coumadin ridge. This terminated the flutter. Block across the mitral line was demonstrated with differential pacing and prox to distal activation pacing from CARLOS EDUARDO superior to the line. Next EP catheters were removed. We exchanged for a TrueSteer WATCHMAN sheath which was maneuvered into the LA appendage over pigtail catheter. An angiogram of the appendage demonstrated a bi-lobed appendage with a superior/anterior lobe and a posterior lobe and a prominent mid-ridge. We inserted the sheath to gain depth in the anterior lobe and the WATCHMAN device was deployed here initially but failed to expand fully to cover the os as the mid-ridge cinched the waist of the device. We re-captured and deployed several times more proximally but ended with similar results. We decided to partially recapture and deploy the device starting from the posterior lobe, initial deployment was too proximal and we were able to gain more  depth posteriorly to deploy a device that was proximal in the higher angle view, but looked good in 0,45,90 with no erlinda-device leak, met compression criteria, and secure tug test. We released the device which was stable post release. At the end of the procedure, heparin was reversed with protamine, the catheter and sheaths were removed, and hemostasis was achieved by Vascade MVP for smaller access and Perclose for the Faradrive access. Following recovery from anesthesia, the patient was transferred to the PPU in good condition.       Total ablation time: 74 applications of Farapulse    Fluoroscopy time: 25.2 minutes     Rhythm at EOS:    1. AV paced 875 ms    Contrast: 15 cc    Device implanted:  Size  27mm  LOT # [id number]  Max appendage pre-measurement 19.3 mm  Max device measurement 23.7 mm (12%) compression    Impressions:    1. Paroxysmal atrial fibrillation.   2. Successful PFA pulmonary vein isolation and posterior wall isolation procedure.  3. Successful ablation of spontaneous mitral flutter (PFA of lateral mitral line)  4. Successful WATCHMAN implant      Recommendations:  1. Resume anticoagulation.  2. Transfer to monitored bedrest.

## 2025-05-09 NOTE — H&P
"EP Pre-procedure H and P    Date of service: 5/9/2025    Reason for visit/Chief complaint: AF    HPI:   Pt is a 75 yo M. History of CHB s/p dual chamber PPM and PAF s/p prior ablation with recurrent episodes and GI bleeding. Back on OAC. Here for repeat ablation + WATCHMAN implant.    Past Medical History:   Diagnosis Date    A-fib (HCC) 09/2023    Status post ablation by Dr. Winslow.    AV block 07/01/2015    Status post PPM implantation.    AVNRT (AV hitesh re-entry tachycardia) (HCC)     Bleeding from the nose     Bronchitis 02/2025    Cancer (HCC)     prostate cancer    Chronic anticoagulation     GERD (gastroesophageal reflux disease)      Heart burn     Hyperlipemia     Hypertension     Pacemaker     Urinary incontinence      Past Surgical History:   Procedure Laterality Date    PACEMAKER REVISION  09/2023    Pacemaker Replaced Per Patient.    STRABISMUS REPAIR Bilateral 08/03/2023    Procedure: BILATERAL MEDIAL RECTUS RECESS, LEFT INFERIOR OBLIQUE TRANSPOSITON;  Surgeon: Etelvina Barbour M.D.;  Location: SURGERY SAME DAY [facility];  Service: Ophthalmology    OTHER  04/2023    prostactectomy    RECOVERY  07/01/2015    Procedure:  CATH LAB  PM INSERT [facility] ICD9: 426.13;  Surgeon: Recoveryonly Surgery;  Location: SURGERY PRE-POST PROC UNIT [facility];  Service:     PACEMAKER INSERTION  07/01/2015    [facility] #[id number] implanted by Dr. Sanchez.    APPENDECTOMY      INGUINAL HERNIA REPAIR BILATERAL      PILONIDAL CYST EXCISION       Family History   Problem Relation Age of Onset    Heart Disease Mother     Diabetes Mother     Cancer Father 86        Pancreatitic       Physical Exam:  Vitals:    05/09/25 0651   BP: (!) 154/94   Pulse: 75   Resp: 16   Temp: 36.4 °C (97.5 °F)   TempSrc: Temporal   SpO2: 99%   Weight: 89.1 kg (196 lb 6.9 oz)   Height: 1.753 m (5' 9\")     Gen: NAD, conversant  HEENT: PERRL, EOMI  LUNGS: CTA B, no w/r/r  CV: RRR, no m/r/g, no JVD  Abd: Soft, NT/ND, +BS  Ext: no edema, warm and " well perfused    Labs reviewed    EKG interpreted by me:  AsVp    Impression/Recs:  1. Paroxysmal atrial fibrillation (HCC)  EC-ELENA W/O CONT    EC-ELENA W/O CONT    CL-EP ABLATION ATRIAL FIBRILLATION    CL-EP ABLATION ATRIAL FIBRILLATION    CL-LEFT ATRIAL APPENDAGE CLOSURE    CL-LEFT ATRIAL APPENDAGE CLOSURE      2. Biventricular cardiac pacemaker in situ  EC-ELENA W/O CONT    EC-ELENA W/O CONT    CL-EP ABLATION ATRIAL FIBRILLATION    CL-EP ABLATION ATRIAL FIBRILLATION        -Risks/benefits/alternatives discussed  -All questions answered  -Proceed with AF ablation + WATCHAMRIT Winslow MD

## 2025-05-09 NOTE — PROGRESS NOTES
4 Eyes Skin Assessment Completed by ARRON Witt and ARRON Reinoso.    Head WDL  Ears WDL  Nose WDL  Mouth WDL  Neck WDL  Breast/Chest WDL  Shoulder Blades WDL  Spine WDL  (R) Arm/Elbow/Hand WDL  (L) Arm/Elbow/Hand WDL  Abdomen WDL  Groin Incision- femoral site  Scrotum/Coccyx/Buttocks Redness and Blanching  (R) Leg WDL  (L) Leg WDL  (R) Heel/Foot/Toe WDL  (L) Heel/Foot/Toe WDL          Devices In Places Tele Box      Interventions In Place Pillows    Possible Skin Injury No    Pictures Uploaded Into Epic N/A  Wound Consult Placed N/A  RN Wound Prevention Protocol Ordered No

## 2025-05-09 NOTE — OR NURSING
1028 - Patient arrival to PPU after ablation/watchmen. Bedside report  with Siobhan HELLER. Pt wakes to verbal. Dressing to right groin is C/D/soft. VSS. Denies pain at this time. Educated patient on plan of care and bedrest instructions.   1050 - left voicemail with patient's wife Annalisa. Tolerating oral intake.  1105 - Family at bedside  1115 - EKG at bedside  1215 - Belongings returned to patient at bedside  1219 - called report to Miryam HELLER. Any questions/concerns addressed.  1230 - patient transported to tele on continuous monitoring with RN and wife and all personal belongings present. Bedside report with Miryam HELLER, right groin site assessed, it is C/D/soft. Any questions/concerns addressed.

## 2025-05-09 NOTE — ANESTHESIA PROCEDURE NOTES
ELENA    Date/Time: 5/9/2025 8:07 AM    Performed by: Rick Jacob M.D.  Authorized by: Rick Jacob M.D.    Start Time:5/9/2025 8:07 AM  Preanesthetic Checklist: patient identified, IV checked, site marked, risks and benefits discussed, surgical consent, monitors and equipment checked, pre-op evaluation and timeout performed    Indication for ELENA: diagnostic     Intubated: Yes  Bite Block: Yes  Heart Visualized: Yes  Insertion: atraumatic    **See FULL ELENA report in patient's chart via [employer]**

## 2025-05-09 NOTE — ANESTHESIA TIME REPORT
Anesthesia Start and Stop Event Times       Date Time Event    5/9/2025 0720 Ready for Procedure     0753 Anesthesia Start     1036 Anesthesia Stop          Responsible Staff  05/09/25      Name Role Begin End    Rick Jacob M.D. Anesth 0753 1036          Overtime Reason:  no overtime (within assigned shift)    Comments:

## 2025-05-10 ENCOUNTER — APPOINTMENT (OUTPATIENT)
Dept: CARDIOLOGY | Facility: MEDICAL CENTER | Age: 75
End: 2025-05-10
Attending: INTERNAL MEDICINE
Payer: MEDICARE

## 2025-05-10 VITALS
BODY MASS INDEX: 28.51 KG/M2 | DIASTOLIC BLOOD PRESSURE: 89 MMHG | OXYGEN SATURATION: 93 % | HEART RATE: 82 BPM | TEMPERATURE: 97.6 F | RESPIRATION RATE: 16 BRPM | SYSTOLIC BLOOD PRESSURE: 136 MMHG | HEIGHT: 69 IN | WEIGHT: 192.46 LBS

## 2025-05-10 LAB
LV EJECT FRACT MOD 2C 99903: 59.86
LV EJECT FRACT MOD 4C 99902: 56.63
LV EJECT FRACT MOD BP 99901: 58.66

## 2025-05-10 PROCEDURE — 700102 HCHG RX REV CODE 250 W/ 637 OVERRIDE(OP): Performed by: INTERNAL MEDICINE

## 2025-05-10 PROCEDURE — A9270 NON-COVERED ITEM OR SERVICE: HCPCS | Performed by: INTERNAL MEDICINE

## 2025-05-10 PROCEDURE — 93308 TTE F-UP OR LMTD: CPT | Mod: 26 | Performed by: INTERNAL MEDICINE

## 2025-05-10 PROCEDURE — 93308 TTE F-UP OR LMTD: CPT

## 2025-05-10 RX ADMIN — APIXABAN 5 MG: 5 TABLET, FILM COATED ORAL at 05:06

## 2025-05-10 RX ADMIN — METOPROLOL SUCCINATE 25 MG: 25 TABLET, EXTENDED RELEASE ORAL at 05:05

## 2025-05-10 SDOH — ECONOMIC STABILITY: TRANSPORTATION INSECURITY
IN THE PAST 12 MONTHS, HAS THE LACK OF TRANSPORTATION KEPT YOU FROM MEDICAL APPOINTMENTS OR FROM GETTING MEDICATIONS?: NO

## 2025-05-10 SDOH — ECONOMIC STABILITY: TRANSPORTATION INSECURITY
IN THE PAST 12 MONTHS, HAS LACK OF RELIABLE TRANSPORTATION KEPT YOU FROM MEDICAL APPOINTMENTS, MEETINGS, WORK OR FROM GETTING THINGS NEEDED FOR DAILY LIVING?: NO

## 2025-05-10 ASSESSMENT — FIBROSIS 4 INDEX: FIB4 SCORE: 1.84

## 2025-05-10 ASSESSMENT — SOCIAL DETERMINANTS OF HEALTH (SDOH)
IN THE PAST 12 MONTHS, HAS THE ELECTRIC, GAS, OIL, OR WATER COMPANY THREATENED TO SHUT OFF SERVICE IN YOUR HOME?: NO
WITHIN THE LAST YEAR, HAVE YOU BEEN KICKED, HIT, SLAPPED, OR OTHERWISE PHYSICALLY HURT BY YOUR PARTNER OR EX-PARTNER?: NO
WITHIN THE PAST 12 MONTHS, YOU WORRIED THAT YOUR FOOD WOULD RUN OUT BEFORE YOU GOT THE MONEY TO BUY MORE: NEVER TRUE
WITHIN THE LAST YEAR, HAVE YOU BEEN HUMILIATED OR EMOTIONALLY ABUSED IN OTHER WAYS BY YOUR PARTNER OR EX-PARTNER?: NO
WITHIN THE PAST 12 MONTHS, THE FOOD YOU BOUGHT JUST DIDN'T LAST AND YOU DIDN'T HAVE MONEY TO GET MORE: NEVER TRUE
WITHIN THE LAST YEAR, HAVE TO BEEN RAPED OR FORCED TO HAVE ANY KIND OF SEXUAL ACTIVITY BY YOUR PARTNER OR EX-PARTNER?: NO
WITHIN THE LAST YEAR, HAVE YOU BEEN AFRAID OF YOUR PARTNER OR EX-PARTNER?: NO

## 2025-05-10 NOTE — PROGRESS NOTES
Bedside report received from off going RN/tech: Tameka, assumed care of patient.     Fall Risk Score: LOW RISK  Fall risk interventions in place: Provide patient/family education based on risk assessment, Educate patient/family to call staff for assistance when getting out of bed, Place fall precaution signage outside patient door, and Place patient in room close to nursing station  Bed type: Regular (Brendan Score less than 17 interventions in place)  Patient on cardiac monitor: Yes  IVF/IV medications: Not Applicable   Oxygen: Room Air  Bedside sitter: Not Applicable   Isolation: Not applicable

## 2025-05-10 NOTE — PROGRESS NOTES
Monitor Summary  Rhythm: Paced  Rate: 71-82  Ectopy: freq PVC rare couplet, rare-occ bigem, rare trigem  .17 / .13 / .42

## 2025-05-10 NOTE — DISCHARGE SUMMARY
Discharge summary    CHIEF COMPLAINT ON ADMISSION  Elective admission for atrial fibrillation ablation and CARLOS EDUARDO Closure device with Watchman secondary to atrial fibrillation    CODE STATUS  Full Code    HPI & HOSPITAL COURSE  This is a 74 y.o. year old male here for elective admission for ablation and CARLOS EDUARDO Closure device with Watchman secondary to atrial fibrillation. He is followed by Dr. Winslow.    History of persistent AF. Desires to come off anticoagulation.  History of GI bleeding. Here for CARLOS EDUARDO closure with WATCHMAN.     Underwent procedures by Dr. Winslow on 5/9/2025:   Successful PFA pulmonary vein isolation and posterior wall isolation procedure   Successful ablation of spontaneous mitral flutter (PFA of lateral mitral line)  Successful WATCHMAN implant     Post procedure, he was continued on his home medications including OAC with Eliquis 5 mg twice daily and metoprolol succinate 25 mg daily    Medical history significant for CHB s/p dual chamber PPM, and PAF s/p prior ablation with recurrent episodes and GI bleeding, nonischemic cardiomyopathy, hypertension.    Procedural conclusions per Dr. Winslow's Op Note 5/9/2025  Electrophysiology Procedure Note  Procedure(s) Performed:   1) Atrial fibrillation ablation  2) Ablation of additional atrial fibrillation mechanism  3) Ablation of additional mechanism of arrhythmia (mitral flutter)  4) CARLOS EDUARDO-closure with WATCHMAN FLX PRO     Indication(s):  Paroxysmal atrial fibrillation  Atypical atrial flutter  Complete heart block  History of GI bleeding     Physician(s): Alda Winslow M.D.     Rhythm at EOS:    1. AV paced 875 ms    Device implanted:  Size  27mm  LOT # [id number]  Max appendage pre-measurement 19.3 mm  Max device measurement 23.7 mm (12%) compression     Impressions:    1. Paroxysmal atrial fibrillation.   2. Successful PFA pulmonary vein isolation and posterior wall isolation procedure.  3. Successful ablation of spontaneous mitral flutter (PFA of lateral mitral  line)  4. Successful WATCHMAN implant      Recommendations:  1. Resume anticoagulation.  2. Transfer to monitored bedrest.        The patient has been seen and examined in EP rounds this AM.  He monitored rhythm is paced rhythm presently.  Telemetry history, EKGs, labs, vital signs, and imaging have been reviewed and are stable, no arrhthymias or abnormalities. The patient denies any chest pain, dyspnea, dizziness, paraesthesias, or other complaints.  His physical exam is without acute findings and CMS fully intact; specifically right femoral access sites which are clean, dry, intact with tegaderm/gauze. No evidence of active bleeding, edema, erythema, or hematoma.  He has been out of bed and ambulated without difficulty.      Therefore, he is discharged in good and stable condition with close outpatient follow-up.       DISCHARGE PROBLEM LIST  1. Persistent Atrial Fibrillation  2. S/P successful pulmonary vein and posterior wall isolation, ablation of spontaneous mitral flutter, CARLOS EDUARDO closure with Watchman by Dr. Winslow on 5/9/2025  3. Chronic Anticoagulation with Eliquis           DIET: CARDIAC DIET    LABORATORY  Lab Results   Component Value Date/Time    SODIUM 142 05/06/2025 08:38 AM    POTASSIUM 4.5 05/06/2025 08:38 AM    CHLORIDE 109 05/06/2025 08:38 AM    CO2 26 05/06/2025 08:38 AM    GLUCOSE 91 05/06/2025 08:38 AM    BUN 27 (H) 05/06/2025 08:38 AM    CREATININE 1.01 05/06/2025 08:38 AM      Lab Results   Component Value Date/Time    WBC 7.1 05/06/2025 08:38 AM    HEMOGLOBIN 14.7 05/06/2025 08:38 AM    HEMATOCRIT 43.4 05/06/2025 08:38 AM    PLATELETCT 202 05/06/2025 08:38 AM          [place] POST WATCHMAN INSTRUCTIONS  No lifting > 10 lbs x 1 week.  No baths or hot tubs x 1 week.      May shower on 5/11/2025 and take off groin dressing and leave site uncovered.  Please try to keep Steri-strip in place and allow to come off on its own.  Continue to monitor site daily for warmth, redness, discolored drainage.    3.  Please do not miss any doses of your blood thinner.      4. Please keep all follow up appointments scheduled for you.     5. A transesophageal echocardiogram (ELENA) will be scheduled for you  to check the healing of the Watchman.  This procedure is completed at [facility] same day procedures.  We use sedation/anesthesia for this procedure so you will need a .     6. Please walk and take deep breaths after discharge.  After discharge, if you experience severe chest pain, shortness of breath, neurological changes, high fever, severe dizziness, trouble with catheter site needs to be seen in the emergency dept.               Medication List        CONTINUE taking these medications        Instructions   CENTRUM SILVER ULTRA MENS PO   Take 1 Tab by mouth every evening.  Dose: 1 Tablet     Eliquis 5 MG Tabs  Generic drug: apixaban   Take 1 Tablet by mouth 2 times a day.  Dose: 5 mg     famotidine 20 MG Tabs  Commonly known as: Pepcid   Take 1 Tablet by mouth as needed.  Dose: 1 Tablet     lisinopril 10 MG Tabs  Commonly known as: Prinivil   Take 1 Tablet by mouth every evening.  Dose: 10 mg     metoprolol SR 25 MG Tb24  Commonly known as: Toprol XL   TAKE 1 TABLET BY MOUTH ONCE DAILY  Dose: 25 mg     simvastatin 40 MG Tabs  Commonly known as: Zocor   Take 1 Tablet by mouth every evening.  Dose: 40 mg     VITAMIN C PO   Take  by mouth every day.     VITAMIN D PO   Take  by mouth every day.                   FOLLOW UP  Patient will follow up with EP in 4 weeks as arranged for procedural followup or sooner if needed.  Patient instructed to monitor daily weights and call the office if abnormal or with any questions/concerns.  The above plan and medications were reviewed in detail with the patient at time of discharge.  All patients questions/concerns were answered and patient verbalized understanding and is in agreement.    Future Appointments   Date Time Provider Department Center   5/28/2025 10:30 AM Cecile SHAH  FOSTER Rivas None   6/20/2025  9:00 AM [facility] EXAM 3 ELENA [facility]   8/8/2025  2:40 PM ADONIS Peña None        Thank you for allowing me to participate in this patients care.  Please contact me with any questions or concerns.     FOSTER Hickey   5/9/2025 5:22 PM

## 2025-05-10 NOTE — PROGRESS NOTES
Bedside report received from day shift RN  ARRON Witt. Pt is currently A&Ox4, Demand Paced, breathing at a normal rate and rhythm, warm, dry, and skin color appropriate for ethnicity, and in no visible emotional or physical distress. Bed locked in lowest position, call light is within reach, fall prevention measures in place. Pt educated to use call light before getting out of bed, pt verbalized understanding. Pt is on a cardiac monitor, order verified, transmitter connected appropriately, and leads placed correctly, rhythm and rate assessed by RN, monitor staff updated of changes and parameters as necessary.  No further needs at this time. Assumed care of pt. Report given to assisting staff - CNA/CCT/ACT    Fall Risk Score: LOW RISK  Fall risk interventions in place: Provide patient/family education based on risk assessment, Educate patient/family to call staff for assistance when getting out of bed, Place fall precaution signage outside patient door, and Place patient in room close to nursing station  Bed type: Regular (Brendan Score less than 17 interventions in place)  Patient on cardiac monitor: Yes  IVF/IV medications: Not Applicable   Oxygen: Room Air  Bedside sitter: Not Applicable   Isolation: Not applicable

## 2025-05-10 NOTE — CARE PLAN
The patient is Watcher - Medium risk of patient condition declining or worsening    Shift Goals  Clinical Goals: orient to unit, VSS, monitor access site, neuro status stable  Patient Goals: rest  Family Goals: gaurav    Progress made toward(s) clinical / shift goals:    Problem: Knowledge Deficit - Standard  Goal: Patient and family/care givers will demonstrate understanding of plan of care, disease process/condition, diagnostic tests and medications  Outcome: Progressing  Note: Discussed plan of care, pt able to actively participate in the learning process and demonstrates understanding by return demonstration or return explanation. Improved ability to communicate regarding their healthcare and current admission. Improved ability to identify barriers to learning and care.       Problem: Skin Integrity  Goal: Skin Integrity is maintained or improved  Note: Skin integrity and turgor assessed, pressure points and bony prominences assessed by this RN. Brendan score evaluated and appropriate interventions implemented to maintain skin integrity. Pillows for positioning and Mobility encouraged Education provided on skin integrity and risk for decubitus ulcers. Measures implemented to improve nutrition as needed. CMS assessed. Positioning pad in use to reduce shearing. RN wound protocol ordered / in place         Patient is not progressing towards the following goals:

## 2025-05-10 NOTE — PROGRESS NOTES
Discharge orders received.  Patient arrived to the discharge lounge.  PIV removed by discharge RN. Meds to beds medications not ordered.  Instructions given, medications reviewed and general discharge education provided to patient.  Follow up appointments discussed.  Patient verbalized understanding of dc instructions and prescriptions.  Patient signed discharge instructions.  Patient verbalized he had all belongings with him, Denied having any home medications locked in our inpatient pharmacy that  he  needs back.. Patient ambulated with steady gait from discharge lounge to private vehicle. Patient left via car with spouse to home in stable condition.

## 2025-05-15 ENCOUNTER — TELEPHONE (OUTPATIENT)
Dept: CARDIOLOGY | Facility: MEDICAL CENTER | Age: 75
End: 2025-05-15
Payer: MEDICARE

## 2025-05-16 NOTE — TELEPHONE ENCOUNTER
1 WEEK POST WATCHMAN FOLLOW UP WOUND CHECK:    Patient is s/p CARLOS EDUARDO closure with Watchman device by Dr. Winslow on (5/9/25) for PAF with intolerance to anticoagulation.    Wound site discussed via telephone encounter. Right femoral access site appears CDI/SANTA per patient. No evidence of active bleeding, edema, erythema, or hematoma present per patient report.     Pt taking OAC, no missed doses.     Patient to follow up in 3-4 weeks as previously scheduled. Patient instructed to contact the office with any questions or concerns.     Future Appointments   Date Time Provider Department Center   5/23/2025 11:00 AM [place] [facility] [place] [facility] [place]   5/28/2025 10:30 AM FOSTER Gamble None   6/20/2025  9:00 AM [facility] EXAM 3 ELENA [facility]   8/8/2025  2:40 PM ADONIS Peña None

## 2025-05-20 NOTE — PROGRESS NOTES
Chief Complaint   Patient presents with    Atrial Fibrillation     F/V D/x: Paroxysmal atrial fibrillation       Subjective     Ed Leland Reid is a 74 y.o. male who presents today for follow up after undergoing AF/AFL ablation and CARLOS EDUARDO-C with Dr. Winslow on 5/9/2025.    Other PMH pertinent for hyperlipidemia, hypertension, AVNRT ablation c/b second degree heart block with PPM in 2015 with CRT-P upgrade in 2023  due to HFrEF and prostate cancer.    Today patient states that he is overall doing well.  Reports that his femoral access site is well-healed.  Denies any known recurrence of A-fib however he is asymptomatic with his atrial arrhythmias.  He is tolerating anticoagulation well without any bleeding problems.    Past Medical History[1]  Past Surgical History[2]  Family History   Problem Relation Age of Onset    Heart Disease Mother     Diabetes Mother     Cancer Father 86        Pancreatitic     Social History     Socioeconomic History    Marital status:      Spouse name: Not on file    Number of children: Not on file    Years of education: Not on file    Highest education level: Not on file   Occupational History    Not on file   Tobacco Use    Smoking status: Never    Smokeless tobacco: Never    Tobacco comments:     Chew was over 35 years ago   Vaping Use    Vaping status: Never Used   Substance and Sexual Activity    Alcohol use: Yes     Alcohol/week: 1.2 oz     Types: 1 Glasses of wine, 1 Cans of beer per week     Comment: LESS THAN WEEKLY    Drug use: No     Types: Inhaled     Comment: smoked MJ in college, none since then.    Sexual activity: Yes     Partners: Female, Male   Other Topics Concern    Not on file   Social History Narrative    Not on file     Social Drivers of Health     Financial Resource Strain: Not on file   Food Insecurity: No Food Insecurity (5/10/2025)    Hunger Vital Sign     Worried About Running Out of Food in the Last Year: Never true     Ran Out of Food in the Last Year: Never  "true   Transportation Needs: No Transportation Needs (5/10/2025)    PRAPARE - Transportation     Lack of Transportation (Medical): No     Lack of Transportation (Non-Medical): No   Physical Activity: Not on file   Stress: Not on file   Social Connections: Not on file   Intimate Partner Violence: Not At Risk (5/10/2025)    Humiliation, Afraid, Rape, and Kick questionnaire     Fear of Current or Ex-Partner: No     Emotionally Abused: No     Physically Abused: No     Sexually Abused: No   Housing Stability: Low Risk  (5/10/2025)    Housing Stability Vital Sign     Unable to Pay for Housing in the Last Year: No     Number of Times Moved in the Last Year: 0     Homeless in the Last Year: No     Allergies[3]  Encounter Medications[4]  Review of Systems   Constitutional:  Negative for malaise/fatigue and weight loss.   Respiratory:  Negative for shortness of breath.    Cardiovascular:  Negative for chest pain, palpitations, orthopnea, claudication, leg swelling and PND.   Neurological:  Negative for dizziness and weakness.   All other systems reviewed and are negative.             Objective     /80 (BP Location: Left arm, Patient Position: Sitting, BP Cuff Size: Large adult)   Pulse 73   Resp 16   Ht 1.753 m (5' 9\")   Wt 88.9 kg (196 lb)   SpO2 93%   BMI 28.94 kg/m²     Physical Exam  Constitutional:       General: He is not in acute distress.     Appearance: He is well-developed.   HENT:      Head: Normocephalic.   Eyes:      Extraocular Movements: Extraocular movements intact.   Neck:      Vascular: No carotid bruit or JVD.   Cardiovascular:      Rate and Rhythm: Normal rate and regular rhythm.      Heart sounds: Normal heart sounds.   Pulmonary:      Effort: Pulmonary effort is normal.      Breath sounds: Normal breath sounds.   Musculoskeletal:      Cervical back: Normal range of motion.      Right lower leg: No edema.      Left lower leg: No edema.   Skin:     General: Skin is warm and dry.   Neurological: "      Mental Status: He is alert and oriented to person, place, and time.   Psychiatric:         Behavior: Behavior normal.                Assessment & Plan     1. Paroxysmal atrial fibrillation (HCC)        2. Biventricular cardiac pacemaker in situ        3. Second degree AV block        4. Circulating anticoagulants (HCC)        5. AVNRT (AV hitesh re-entry tachycardia) (HCC)        6. Presence of cardiac resynchronization therapy pacemaker (CRT-P)        7. Primary hypertension        8. Pure hypercholesterolemia        9. Hypercoagulable state due to paroxysmal atrial fibrillation (HCC)        10. Anticoagulated        11. S/P ablation of atrial fibrillation        12. Presence of Watchman left atrial appendage closure device            Medical Decision Making: Today's Assessment/Status/Plan:        pAF:  - S/P PVI, PWI and mitral flutter ablation with Dr. Winslow on 5/9/2025.   - Total A-fib burden since ablation less than 1%.  - S/P CARLOS EDUARDO-C via WATCHMAN on 5/9/2025. Follow up ELENA scheduled as below to check watchman placement and for FRAN device leak.  Discussed the importance of uninterrupted OAC for 2 months post ablation.  If ELENA shows well-seated watchman with no peridevice leak after 2 months he may stop OAC and start ASA 81 mg daily.  - Post ablation healing duration and expectations reviewed with patient.    Second-degree heart block status post CRT-P upgrade in 2023 due to HFrEF:  - LVEF now recovered.  Patient appears well compensated on exam, no evidence of acute heart failure.  - Continue Toprol 25 mg daily and simvastatin 10 mg daily.  - Device interrogation today shows normal sensing and function.  Battery longevity approximately 2.5 to 2.9 years.  No programming changes made.    Hypertension:  - Well-controlled on current medication regimen.    Hyperlipidemia:  - Last lipid panel was obtained in November 2023 showing cholesterol levels were very well-controlled.  - Discussed with patient to have lipid  panel drawn with next set of blood work.  - Continue simvastatin 40 mg daily.    Patient will follow-up with Dr. Winslow as previously scheduled below or earlier if needed.  Encourage patient to contact our office she any questions or concerns arise meantime.      Future Appointments   Date Time Provider Department Center   6/20/2025  9:00 AM [facility] EXAM 3 ELENA [facility]   8/8/2025  2:40 PM Alda Winslow M.D. [facility] None                      [1]   Past Medical History:  Diagnosis Date    A-fib (HCC) 09/2023    Status post ablation by Dr. Winslow.    AV block 07/01/2015    Status post PPM implantation.    AVNRT (AV hitesh re-entry tachycardia) ([facility])     Bleeding from the nose     Bronchitis 02/2025    Cancer (HCC)     prostate cancer    Chronic anticoagulation     GERD (gastroesophageal reflux disease)      Heart burn     Hyperlipemia     Hypertension     Pacemaker     Urinary incontinence    [2]   Past Surgical History:  Procedure Laterality Date    PACEMAKER REVISION  09/2023    Pacemaker Replaced Per Patient.    STRABISMUS REPAIR Bilateral 08/03/2023    Procedure: BILATERAL MEDIAL RECTUS RECESS, LEFT INFERIOR OBLIQUE TRANSPOSITON;  Surgeon: Etelvina Barbour M.D.;  Location: SURGERY SAME DAY [facility];  Service: Ophthalmology    OTHER  04/2023    prostactectomy    RECOVERY  07/01/2015    Procedure:  CATH LAB  PM INSERT [facility] ICD9: 426.13;  Surgeon: Recoveryon Surgery;  Location: SURGERY PRE-POST PROC UNIT [facility] [place];  Service:     PACEMAKER INSERTION  07/01/2015    [facility] #[id number] implanted by Dr. Sanchez.    APPENDECTOMY      INGUINAL HERNIA REPAIR BILATERAL      PILONIDAL CYST EXCISION     [3] No Known Allergies  [4]   Outpatient Encounter Medications as of 5/28/2025   Medication Sig Dispense Refill    metoprolol SR (TOPROL XL) 25 MG TABLET SR 24 HR TAKE 1 TABLET BY MOUTH ONCE DAILY 90 Tablet 3    famotidine (PEPCID) 20 MG Tab Take 1 Tablet by mouth as needed.      apixaban (ELIQUIS) 5mg Tab Take 1  Tablet by mouth 2 times a day. 60 Tablet 3    VITAMIN D PO Take  by mouth every day.      Ascorbic Acid (VITAMIN C PO) Take  by mouth every day.      simvastatin (ZOCOR) 40 MG Tab Take 1 Tablet by mouth every evening. 90 Tablet 3    lisinopril (PRINIVIL) 10 MG Tab Take 1 Tablet by mouth every evening. 90 Tablet 3    Multiple Vitamins-Minerals (CENTRUM SILVER ULTRA MENS PO) Take 1 Tab by mouth every evening.       No facility-administered encounter medications on file as of 5/28/2025.

## 2025-05-23 ENCOUNTER — APPOINTMENT (OUTPATIENT)
Dept: ADMISSIONS | Facility: MEDICAL CENTER | Age: 75
End: 2025-05-23
Attending: INTERNAL MEDICINE
Payer: MEDICARE

## 2025-05-28 ENCOUNTER — PRE-ADMISSION TESTING (OUTPATIENT)
Dept: ADMISSIONS | Facility: MEDICAL CENTER | Age: 75
End: 2025-05-28
Attending: INTERNAL MEDICINE
Payer: MEDICARE

## 2025-05-28 ENCOUNTER — OFFICE VISIT (OUTPATIENT)
Dept: CARDIOLOGY | Facility: MEDICAL CENTER | Age: 75
End: 2025-05-28
Attending: NURSE PRACTITIONER
Payer: MEDICARE

## 2025-05-28 VITALS
OXYGEN SATURATION: 93 % | RESPIRATION RATE: 16 BRPM | HEIGHT: 69 IN | DIASTOLIC BLOOD PRESSURE: 80 MMHG | WEIGHT: 196 LBS | HEART RATE: 73 BPM | BODY MASS INDEX: 29.03 KG/M2 | SYSTOLIC BLOOD PRESSURE: 110 MMHG

## 2025-05-28 DIAGNOSIS — I47.19 AVNRT (AV NODAL RE-ENTRY TACHYCARDIA) (HCC): ICD-10-CM

## 2025-05-28 DIAGNOSIS — Z86.79 S/P ABLATION OF ATRIAL FIBRILLATION: ICD-10-CM

## 2025-05-28 DIAGNOSIS — D68.318 CIRCULATING ANTICOAGULANTS (HCC): ICD-10-CM

## 2025-05-28 DIAGNOSIS — I10 PRIMARY HYPERTENSION: ICD-10-CM

## 2025-05-28 DIAGNOSIS — I48.0 PAROXYSMAL ATRIAL FIBRILLATION (HCC): Primary | ICD-10-CM

## 2025-05-28 DIAGNOSIS — Z95.0 PRESENCE OF CARDIAC RESYNCHRONIZATION THERAPY PACEMAKER (CRT-P): ICD-10-CM

## 2025-05-28 DIAGNOSIS — E78.00 PURE HYPERCHOLESTEROLEMIA: ICD-10-CM

## 2025-05-28 DIAGNOSIS — Z95.0 BIVENTRICULAR CARDIAC PACEMAKER IN SITU: ICD-10-CM

## 2025-05-28 DIAGNOSIS — Z95.818 PRESENCE OF WATCHMAN LEFT ATRIAL APPENDAGE CLOSURE DEVICE: ICD-10-CM

## 2025-05-28 DIAGNOSIS — I48.0 HYPERCOAGULABLE STATE DUE TO PAROXYSMAL ATRIAL FIBRILLATION (HCC): ICD-10-CM

## 2025-05-28 DIAGNOSIS — I44.1 SECOND DEGREE AV BLOCK: ICD-10-CM

## 2025-05-28 DIAGNOSIS — D68.69 HYPERCOAGULABLE STATE DUE TO PAROXYSMAL ATRIAL FIBRILLATION (HCC): ICD-10-CM

## 2025-05-28 DIAGNOSIS — Z98.890 S/P ABLATION OF ATRIAL FIBRILLATION: ICD-10-CM

## 2025-05-28 DIAGNOSIS — Z79.01 ANTICOAGULATED: ICD-10-CM

## 2025-05-28 PROCEDURE — 99212 OFFICE O/P EST SF 10 MIN: CPT | Performed by: NURSE PRACTITIONER

## 2025-05-28 PROCEDURE — 93281 PM DEVICE PROGR EVAL MULTI: CPT | Performed by: NURSE PRACTITIONER

## 2025-05-28 ASSESSMENT — ENCOUNTER SYMPTOMS
CLAUDICATION: 0
WEIGHT LOSS: 0
ORTHOPNEA: 0
SHORTNESS OF BREATH: 0
PALPITATIONS: 0
WEAKNESS: 0
DIZZINESS: 0
PND: 0

## 2025-05-28 ASSESSMENT — FIBROSIS 4 INDEX: FIB4 SCORE: 1.84

## 2025-05-28 NOTE — PREADMIT AVS NOTE
Current Medications   Medication Instructions    metoprolol SR (TOPROL XL) 25 MG TABLET SR 24 HR Continue taking as prescribed.    famotidine (PEPCID) 20 MG Tab Continue taking as prescribed.    apixaban (ELIQUIS) 5mg Tab Follow instructions from surgeon or specialist.    VITAMIN D PO Stop 7 days before surgery    Ascorbic Acid (VITAMIN C PO) Stop 7 days before surgery    simvastatin (ZOCOR) 40 MG Tab Continue taking as prescribed.    lisinopril (PRINIVIL) 10 MG Tab Stop 24 hours before surgery    Multiple Vitamins-Minerals (CENTRUM SILVER ULTRA MENS PO) Stop 7 days before surgery

## 2025-05-28 NOTE — OR NURSING
Pre-Admit RN appointment completed for 6/20/25. Copy of Medication Reconciliation with instructions provided to pt via PlayEnable.

## 2025-05-29 PROCEDURE — RXMED WILLOW AMBULATORY MEDICATION CHARGE: Performed by: INTERNAL MEDICINE

## 2025-06-03 ENCOUNTER — PHARMACY VISIT (OUTPATIENT)
Dept: PHARMACY | Facility: MEDICAL CENTER | Age: 75
End: 2025-06-03
Payer: COMMERCIAL

## 2025-06-20 ENCOUNTER — ANESTHESIA (OUTPATIENT)
Dept: CARDIOLOGY | Facility: MEDICAL CENTER | Age: 75
End: 2025-06-20
Payer: MEDICARE

## 2025-06-20 ENCOUNTER — APPOINTMENT (OUTPATIENT)
Dept: CARDIOLOGY | Facility: MEDICAL CENTER | Age: 75
End: 2025-06-20
Attending: INTERNAL MEDICINE
Payer: MEDICARE

## 2025-06-20 ENCOUNTER — ANESTHESIA EVENT (OUTPATIENT)
Dept: CARDIOLOGY | Facility: MEDICAL CENTER | Age: 75
End: 2025-06-20
Payer: MEDICARE

## 2025-06-20 ENCOUNTER — HOSPITAL ENCOUNTER (OUTPATIENT)
Facility: MEDICAL CENTER | Age: 75
End: 2025-06-20
Attending: INTERNAL MEDICINE | Admitting: INTERNAL MEDICINE
Payer: MEDICARE

## 2025-06-20 VITALS
HEIGHT: 69 IN | SYSTOLIC BLOOD PRESSURE: 149 MMHG | RESPIRATION RATE: 16 BRPM | OXYGEN SATURATION: 94 % | DIASTOLIC BLOOD PRESSURE: 90 MMHG | TEMPERATURE: 97 F | BODY MASS INDEX: 28.7 KG/M2 | WEIGHT: 193.78 LBS | HEART RATE: 62 BPM

## 2025-06-20 DIAGNOSIS — I48.0 PAROXYSMAL ATRIAL FIBRILLATION (HCC): ICD-10-CM

## 2025-06-20 PROCEDURE — 160193 HCHG PACU STANDARD - 1ST 60 MINS

## 2025-06-20 PROCEDURE — 93312 ECHO TRANSESOPHAGEAL: CPT | Mod: 26 | Performed by: INTERNAL MEDICINE

## 2025-06-20 PROCEDURE — 93312 ECHO TRANSESOPHAGEAL: CPT

## 2025-06-20 PROCEDURE — 700111 HCHG RX REV CODE 636 W/ 250 OVERRIDE (IP): Performed by: STUDENT IN AN ORGANIZED HEALTH CARE EDUCATION/TRAINING PROGRAM

## 2025-06-20 PROCEDURE — 160002 HCHG RECOVERY MINUTES (STAT)

## 2025-06-20 PROCEDURE — 700105 HCHG RX REV CODE 258: Performed by: STUDENT IN AN ORGANIZED HEALTH CARE EDUCATION/TRAINING PROGRAM

## 2025-06-20 PROCEDURE — 700101 HCHG RX REV CODE 250: Performed by: STUDENT IN AN ORGANIZED HEALTH CARE EDUCATION/TRAINING PROGRAM

## 2025-06-20 RX ORDER — LIDOCAINE HYDROCHLORIDE 20 MG/ML
INJECTION, SOLUTION EPIDURAL; INFILTRATION; INTRACAUDAL; PERINEURAL PRN
Status: DISCONTINUED | OUTPATIENT
Start: 2025-06-20 | End: 2025-06-20 | Stop reason: SURG

## 2025-06-20 RX ORDER — DIPHENHYDRAMINE HYDROCHLORIDE 50 MG/ML
12.5 INJECTION, SOLUTION INTRAMUSCULAR; INTRAVENOUS
Status: DISCONTINUED | OUTPATIENT
Start: 2025-06-20 | End: 2025-06-20 | Stop reason: HOSPADM

## 2025-06-20 RX ORDER — EPHEDRINE SULFATE 50 MG/ML
5 INJECTION, SOLUTION INTRAVENOUS
Status: DISCONTINUED | OUTPATIENT
Start: 2025-06-20 | End: 2025-06-20 | Stop reason: HOSPADM

## 2025-06-20 RX ORDER — ONDANSETRON 2 MG/ML
4 INJECTION INTRAMUSCULAR; INTRAVENOUS
Status: DISCONTINUED | OUTPATIENT
Start: 2025-06-20 | End: 2025-06-20 | Stop reason: HOSPADM

## 2025-06-20 RX ORDER — SODIUM CHLORIDE, SODIUM LACTATE, POTASSIUM CHLORIDE, CALCIUM CHLORIDE 600; 310; 30; 20 MG/100ML; MG/100ML; MG/100ML; MG/100ML
INJECTION, SOLUTION INTRAVENOUS
Status: DISCONTINUED | OUTPATIENT
Start: 2025-06-20 | End: 2025-06-20 | Stop reason: SURG

## 2025-06-20 RX ORDER — ALBUTEROL SULFATE 5 MG/ML
2.5 SOLUTION RESPIRATORY (INHALATION)
Status: DISCONTINUED | OUTPATIENT
Start: 2025-06-20 | End: 2025-06-20 | Stop reason: HOSPADM

## 2025-06-20 RX ADMIN — PROPOFOL 20 MG: 10 INJECTION, EMULSION INTRAVENOUS at 08:28

## 2025-06-20 RX ADMIN — PROPOFOL 20 MG: 10 INJECTION, EMULSION INTRAVENOUS at 08:31

## 2025-06-20 RX ADMIN — LIDOCAINE HYDROCHLORIDE 60 MG: 20 INJECTION, SOLUTION EPIDURAL; INFILTRATION; INTRACAUDAL; PERINEURAL at 08:22

## 2025-06-20 RX ADMIN — PROPOFOL 80 MG: 10 INJECTION, EMULSION INTRAVENOUS at 08:22

## 2025-06-20 RX ADMIN — PROPOFOL 20 MG: 10 INJECTION, EMULSION INTRAVENOUS at 08:26

## 2025-06-20 RX ADMIN — PROPOFOL 20 MG: 10 INJECTION, EMULSION INTRAVENOUS at 08:25

## 2025-06-20 RX ADMIN — SODIUM CHLORIDE, POTASSIUM CHLORIDE, SODIUM LACTATE AND CALCIUM CHLORIDE: 600; 310; 30; 20 INJECTION, SOLUTION INTRAVENOUS at 08:13

## 2025-06-20 ASSESSMENT — FIBROSIS 4 INDEX: FIB4 SCORE: 1.87

## 2025-06-20 NOTE — PROGRESS NOTES
0838 - Patient arrival to PPU after ELENA. Bedside report  with JERRI HELLER AND DR. ARREDONDO. Pt IS awake and alert. ORAL AIRWAY OUT ALMOST IMMEDIATELY VSS. Denies pain at this time. Educated patient on plan of care and bedrest instructions.   0838 - Belongings returned to patient at bedside  0910 - Tolerating oral intake.  0845 - Updated patient's WIFE JON  0847 - Family at bedside      0925 - Discharge instructions given; discussed diet, activity, medications, dressing care, follow up care, and worsening symptoms. Pt and WIFE verbalized understanding and questions answered.  0925 - Pt's VSS; denies N/V; patient out of bed, denies discomfort.  IV dc'd. Patient states ready to d/c home.  Pt taken out  in stable condition to meet DISCHARGE CRITERIA with all belongings present.

## 2025-06-20 NOTE — DISCHARGE INSTRUCTIONS
What to Expect Post Anesthesia    Rest and take it easy for the first 24 hours.  A responsible adult is recommended to remain with you during that time.  It is normal to feel sleepy.  We encourage you to not do anything that requires balance, judgment or coordination.    FOR 24 HOURS DO NOT:  Drive, operate machinery or run household appliances.  Drink beer or alcoholic beverages.  Make important decisions or sign legal documents.    To avoid nausea, slowly advance diet as tolerated, avoiding spicy or greasy foods for the first day.  Add more substantial food to your diet according to your provider's instructions.  INCREASE FLUIDS AND FIBER TO AVOID CONSTIPATION.    MILD FLU-LIKE SYMPTOMS ARE NORMAL.  YOU MAY EXPERIENCE GENERALIZED MUSCLE ACHES, THROAT IRRITATION, HEADACHE AND/OR SOME NAUSEA.  If any questions arise, call your provider.  If your provider is not available, please feel free to call the Surgical Center at [phone].    MEDICATIONS: Resume taking daily medication.  Take prescribed pain medication with food.  If no medication is prescribed, you may take non-aspirin pain medication if needed.  PAIN MEDICATION CAN BE VERY CONSTIPATING.  Take a stool softener or laxative such as senokot, pericolace, or milk of magnesia if needed.    Diet    Resume your normal diet as tolerated.  A diet low in cholesterol, fat, and sodium is recommended for good health.     Discharge Instructions:  Discharge Instructions:  Transesophageal Echocardiogram (ELENA)    ELENA is a test that uses sound waves to take pictures of your heart. ELENA is done by passing a small probe attached to a flexible tube down the part of the body that moves food from your mouth to your stomach (esophagus).    The pictures give clear images of your heart. This can help your doctor see if there are problems with your heart.   General instructions    Follow instructions from your doctor about what you cannot eat or drink.   You will take out any  dentures or dental retainers.   Plan to have a responsible adult take you home from the hospital or clinic.   Plan to have a responsible adult care for you for the time you are told after you leave the hospital or clinic. This is important.  What can I expect after the procedure?    You will be monitored until you leave the hospital or clinic. This includes checking your blood pressure, heart rate, breathing rate, and blood oxygen level.   Your throat may feel sore and numb. This will get better over time. You will not be allowed to eat or drink until the numbness has gone away.   It is common to have a sore throat for a day or two.   It is up to you to get the results of your procedure. Ask how to get your results when they are ready.   Pink tinge sputum is common after your procedure  Follow these instructions at home:    If you were given a sedative during your procedure, do not drive or use machines until your doctor says that it is safe.   Return to your normal activities when your doctor says that it is safe.   Keep all follow-up visits.  Go to ER / call 911:   If you cough up bright red blood or vomit blood   If you have severe pain in throat/stomach.   If you have difficulty breathing that does not go away with rest  Summary    ELENA is a test that uses sound waves to take pictures of your heart.   You will be given a medicine to help you relax.   Pink tinge sputum is common after your procedure     DRESSING: Not applicable     BOWEL FUNCTION:  If you are having problems, use what you normally would or call your provider for suggestions. It also helps to stay regular by including fiber in your diet (for example: bran or fruits and vegetables) and drink plenty of liquids (water, juice, etc.).    Discharge Instructions:  Transesophageal Echocardiogram (ELENA)    ELENA is a test that uses sound waves to take pictures of your heart. ELENA is done by passing a small probe attached to a flexible tube down the part of the  body that moves food from your mouth to your stomach (esophagus).    The pictures give clear images of your heart. This can help your doctor see if there are problems with your heart.   General instructions    Follow instructions from your doctor about what you cannot eat or drink.   You will take out any dentures or dental retainers.   Plan to have a responsible adult take you home from the hospital or clinic.   Plan to have a responsible adult care for you for the time you are told after you leave the hospital or clinic. This is important.  What can I expect after the procedure?    You will be monitored until you leave the hospital or clinic. This includes checking your blood pressure, heart rate, breathing rate, and blood oxygen level.   Your throat may feel sore and numb. This will get better over time. You will not be allowed to eat or drink until the numbness has gone away.   It is common to have a sore throat for a day or two.   It is up to you to get the results of your procedure. Ask how to get your results when they are ready.   Pink tinge sputum is common after your procedure  Follow these instructions at home:    If you were given a sedative during your procedure, do not drive or use machines until your doctor says that it is safe.   Return to your normal activities when your doctor says that it is safe.   Keep all follow-up visits.  Go to ER / call 911:   If you cough up bright red blood or vomit blood   If you have severe pain in throat/stomach.   If you have difficulty breathing that does not go away with rest  Summary    ELENA is a test that uses sound waves to take pictures of your heart.   You will be given a medicine to help you relax.   Pink tinge sputum is common after your procedure

## 2025-06-20 NOTE — ANESTHESIA POSTPROCEDURE EVALUATION
Patient: Emil Reid    Procedure Summary       Date: 06/20/25 Room / Location: [facility] - Echocardiology [facility]    Anesthesia Start: 0813 Anesthesia Stop: 0838    Procedure: EC-ELENA W/O CONT Diagnosis:       Paroxysmal atrial fibrillation (HCC)      (Atrial Fibrillation)    Scheduled Providers: Gorge Mabry M.D.; Levon Chow M.D. Responsible Provider: Levon Chow M.D.    Anesthesia Type: MAC ASA Status: 3            Final Anesthesia Type: MAC  Last vitals  BP   Blood Pressure : 112/78    Temp   35.8 °C (96.5 °F)    Pulse   67   Resp   16    SpO2   98 %      Anesthesia Post Evaluation    Patient location during evaluation: PACU  Patient participation: complete - patient participated  Level of consciousness: awake and alert    Airway patency: patent  Anesthetic complications: no  Cardiovascular status: hemodynamically stable  Respiratory status: acceptable  Hydration status: euvolemic    PONV: none          No notable events documented.     Nurse Pain Score: 0 (NPRS)

## 2025-06-20 NOTE — ANESTHESIA PREPROCEDURE EVALUATION
Date/Time: 06/20/25 0830    Scheduled providers: Gorge Mabry M.D.; Levon Chow M.D.    Procedure: EC-ELENA W/O CONT    Diagnosis: Paroxysmal atrial fibrillation (HCC) [I48.0]    Indications: Atrial Fibrillation    Location: [facility] - Echocardiology [facility]            Relevant Problems   CARDIAC   (positive) AVNRT (AV hitesh re-entry tachycardia) (HCC)   (positive) Biventricular cardiac pacemaker in situ   (positive) Hypertension   (positive) Paroxysmal atrial fibrillation (HCC)   (positive) Second degree AV block      GI   (positive) GERD (gastroesophageal reflux disease)       Physical Exam    Airway   Mallampati: II  TM distance: >3 FB  Neck ROM: full       Cardiovascular - normal exam  Rhythm: regular  Rate: normal    (-) murmur     Dental    Pulmonary - normal exam   Abdominal    Neurological - normal exam                   Anesthesia Plan    ASA 3   ASA physical status 3 criteria: implanted pacemaker    Plan - MAC               Induction: intravenous    Postoperative Plan: Postoperative administration of opioids is intended.    Pertinent diagnostic labs and testing reviewed    Informed Consent:    Anesthetic plan and risks discussed with patient.    Use of blood products discussed with: patient whom consented to blood products.

## 2025-06-20 NOTE — ANESTHESIA TIME REPORT
Anesthesia Start and Stop Event Times       Date Time Event    6/20/2025 0813 Ready for Procedure     0813 Anesthesia Start     0838 Anesthesia Stop          Responsible Staff  06/20/25      Name Role Begin End    Levon Chow M.D. Anesth 0813 0838          Overtime Reason:  no overtime (within assigned shift)    Comments:

## 2025-06-24 ENCOUNTER — TELEPHONE (OUTPATIENT)
Dept: CARDIOLOGY | Facility: MEDICAL CENTER | Age: 75
End: 2025-06-24
Payer: MEDICARE

## 2025-06-24 NOTE — TELEPHONE ENCOUNTER
JS    Caller: Emil Reid     Topic/issue: Patient is requesting a call back about the results from the ELENA and wants to know what his next steps are.    Please advise.    Callback Number: [phone]      Thank you,  Cheri BEAR

## 2025-06-25 NOTE — TELEPHONE ENCOUNTER
FOSTER Gamble to Me (Selected Message)      6/25/25  4:28 PM  Very small erlinda-device leak. Can stop OAC 2 months after his ablation and start ASA 81 mg daily.

## 2025-06-26 NOTE — TELEPHONE ENCOUNTER
Attempted to call patient to discuss ELENA results and JS recommendations. Patient did not answer. Left a VM for a call back.

## 2025-07-10 ENCOUNTER — TELEPHONE (OUTPATIENT)
Dept: CARDIOLOGY | Facility: MEDICAL CENTER | Age: 75
End: 2025-07-10
Payer: MEDICARE

## 2025-07-10 NOTE — TELEPHONE ENCOUNTER
Last OV: 05/28/2025  Proposed Surgery: EGD with Deep (Propofol) Sedation.  Surgery Date: 07/29/2025  Requesting Office Name: [facility]  Fax Number: [phone]  Preference of Location (default is surgery center unless specified by Cardiologist or JAE)  Prior Clearance Addressed: No    Is this a general clearance? YES   Anticoags/Antiplatelets: Apixaban   Anticoags/Antiplatelet managed by Cardiology? YES    Outstanding Cardiac Imaging : No  Ablation, Cardioversion, Stent, Cardiac Devices, Catheterization, Watchman: Yes  Within the last 6 months. Forward to provider to review.  TAVR/Valve, Mitral Clip, Watchman (including open heart),: Completed within the last 6 months- Forward to provider to review    Recent Cardiac Hospitalization: Yes  Date:  05/09/2025            When: Hospitalized in the last 3 months. Forward to provider to review.   History (cardiac history): Past Medical History[1]        Is this a dental clearance? NO  Ablation, Cardioversion, Watchman, Stents, Cath, Devices within the last 3 months? No   If yes- Send dental wait letter, do not forward to provider for review.     TAVR / Valve, Mitral clip within the last 6 months? No  If yes- Send dental wait letter, do not forward to provider for review.     If completing a general clearance, continue per protocol.           Surgical Clearance Letter Sent: No Provider to advise.   **Scan clearance request letter into [facility].**          [1]   Past Medical History:  Diagnosis Date    A-fib (HCC) 09/2023    Status post ablation by Dr. Winslow.    Anemia     2023    AV block 07/01/2015    Status post PPM implantation.    AVNRT (AV hitesh re-entry tachycardia) (HCC)     Bleeding from the nose     Bronchitis 02/2025    Cancer (HCC)     prostate cancer    Chronic anticoagulation     GERD (gastroesophageal reflux disease)      Heart burn     Hemorrhagic disorder (HCC)     Taking Eliquis    High cholesterol     Hyperlipemia     Hypertension      Pacemaker     Urinary incontinence

## 2025-07-10 NOTE — LETTER
PROCEDURE/SURGERY CLEARANCE FORM    Date: 7/15/2025   Patient Name: Emil Reid    Dear Surgeon or Proceduralist,     The above patient is cleared to have the following procedure/surgery: EGD with Deep (Propofol) Sedation.    Additional comments: Okay to proceed but cannot hold ASA 81 mg daily (should not still be on Eliquis) for 6 months post WATCHMAN.      JS     Thank you for your request for cardiac stratification of our mutual patient Emil Reid 1950. We have reviewed their [facility] records; and to the best of our understanding this patient has not had stenting, ablation, watchman, cardiothoracic surgery or hospitalization for cardiovascular reasons in the past 6 months.  Emil Reid has been seen within the past 15 months and is considered to have non-modifiable cardiac risk for this low-risk procedure/surgery. They may proceed from a cardiovascular standpoint and may hold their antiplatelet/anticoagulation as briefly as possible. Please have patient resume this medication when hemodynamically stable to do so.     Aspirin or Prasugrel   - hold 7 days prior to procedure/surgery, resume when hemodynamically stable      Clopidrogrel or Ticagrelor  - hold 7 days for all neurological procedures, hold 5 days prior to all other procedure/surgery,  resume when hemodynamically stable     Warfarin - hold 7 days for all neurological procedures, hold 5 days prior to all other procedure/surgery and coordinate with [facility] ([phone]) INR testing and dose management.      Pradaxa/Xarelto/Eliquis/Savesya - hold 1 day prior to procedure for low bleeding risk procedure, 2 days for high bleeding risk procedure, or consider holding 3 days or longer for patients with reduced kidney function (CrCl <30mL/min) or spinal/cranial surgeries/procedures.      If they have a mechanical heart valve, please coordinate with [facility] ([phone]) the proper  management of their anticoagulant in the periprocedural or perioperative period.      Some patients have higher risk for cardiovascular complications or holding medication. If our patient has had prior complications of holding antiplatelet or anticoagulants in the past and we have seen them after these events, we have addressed these concerns with the patient. They are at an unknown degree of increased risk for recurrent complication.  You may hold anticoagulation/antiplatelets for the procedure or surgery if the benefits of the procedure or surgery outweigh this nonmodifiable risk.      If Emil Reid 1950 has new symptoms of heart failure decompensation, unstable arrythmia, or angina please reach out and we will assess the patient.      If you have other patient-specific concerns, please feel free to reach out to the patient's cardiologist directly at [phone].     Thank you,   [facility]    __ _Electronically signed________  Provider: FOSTER Gamble

## (undated) DEVICE — MASK OXYGEN VNYL ADLT MED CONC WITH 7 FOOT TUBING  - (50EA/CA)

## (undated) DEVICE — SUTURE 6-0 VICRYL S-28 (12PK/BX)

## (undated) DEVICE — SODIUM CHL IRRIGATION 0.9% 1000ML (12EA/CA)

## (undated) DEVICE — LACTATED RINGERS INJ 1000 ML - (14EA/CA 60CA/PF)

## (undated) DEVICE — GLOVE BIOGEL SZ 6 PF LATEX - (50EA/BX 4BX/CA)

## (undated) DEVICE — SUTURE 6-0 PLAIN GUT G-1 D/A 18 (12PK/BX)"

## (undated) DEVICE — GLOVE SZ 7 BIOGEL PI MICRO - PF LF (50PR/BX 4BX/CA)

## (undated) DEVICE — SUTURE, 6/0 MERSIL S-14

## (undated) DEVICE — TUBE CONNECTING SUCTION - CLEAR PLASTIC STERILE 72 IN (50EA/CA)

## (undated) DEVICE — GOWN WARMING STANDARD FLEX - (30/CA)

## (undated) DEVICE — CANISTER SUCTION RIGID RED 1500CC (40EA/CA)

## (undated) DEVICE — CANNULA O2 COMFORT SOFT EAR ADULT 7 FT TUBING (50/CA)

## (undated) DEVICE — MASK AIRWAY SIZE 4 UNIQUE SILICON (10EA/BX)

## (undated) DEVICE — PAD MAGNETIC INSTRUMENT FOAM HOLDER (200/CA)

## (undated) DEVICE — KIT  I.V. START (100EA/CA)

## (undated) DEVICE — Device

## (undated) DEVICE — TUBING CLEARLINK DUO-VENT - C-FLO (48EA/CA)

## (undated) DEVICE — SLEEVE VASO CALF MED - (10PR/CA)

## (undated) DEVICE — SPEAR EYE SPNG 3ANG MLBL HNDL - (10/ST18ST/PK 180/PK 1PK/SP)

## (undated) DEVICE — SUTURE GENERAL

## (undated) DEVICE — SENSOR OXIMETER ADULT SPO2 RD SET (20EA/BX)

## (undated) DEVICE — CANISTER SUCTION 3000ML MECHANICAL FILTER AUTO SHUTOFF MEDI-VAC NONSTERILE LF DISP  (40EA/CA)

## (undated) DEVICE — SUCTION INSTRUMENT YANKAUER BULBOUS TIP W/O VENT (50EA/CA)

## (undated) DEVICE — WATER IRRIGATION STERILE 1000ML (12EA/CA)

## (undated) DEVICE — TOWEL STOP TIMEOUT SAFETY FLAG (40EA/CA)

## (undated) DEVICE — SUTURE EYE

## (undated) DEVICE — DRAPE MAGNETIC (INSTRA-MAG) - (30/CA)

## (undated) DEVICE — CLOSURE WOUND 1/4 X 4 (STERI - STRIP) (50/BX 4BX/CA)

## (undated) DEVICE — CAUTERY OPTHALMIC WECK FINE TIP (10EA/SP)

## (undated) DEVICE — SET LEADWIRE 5 LEAD BEDSIDE DISPOSABLE ECG (1SET OF 5/EA)

## (undated) DEVICE — GLOVE BIOGEL PI INDICATOR SZ 7.5 SURGICAL PF LF -(50/BX 4BX/CA)